# Patient Record
Sex: MALE | Race: WHITE | NOT HISPANIC OR LATINO | Employment: FULL TIME | URBAN - METROPOLITAN AREA
[De-identification: names, ages, dates, MRNs, and addresses within clinical notes are randomized per-mention and may not be internally consistent; named-entity substitution may affect disease eponyms.]

---

## 2019-02-03 ENCOUNTER — MYC MEDICAL ADVICE (OUTPATIENT)
Dept: FAMILY MEDICINE | Facility: CLINIC | Age: 39
End: 2019-02-03

## 2019-02-15 ENCOUNTER — OFFICE VISIT (OUTPATIENT)
Dept: FAMILY MEDICINE | Facility: CLINIC | Age: 39
End: 2019-02-15
Payer: COMMERCIAL

## 2019-02-15 VITALS
TEMPERATURE: 98.4 F | DIASTOLIC BLOOD PRESSURE: 82 MMHG | BODY MASS INDEX: 32.88 KG/M2 | WEIGHT: 222 LBS | SYSTOLIC BLOOD PRESSURE: 139 MMHG | HEIGHT: 69 IN | OXYGEN SATURATION: 94 % | HEART RATE: 60 BPM | RESPIRATION RATE: 16 BRPM

## 2019-02-15 DIAGNOSIS — R03.0 PREHYPERTENSION: ICD-10-CM

## 2019-02-15 DIAGNOSIS — Z00.00 ANNUAL PHYSICAL EXAM: Primary | ICD-10-CM

## 2019-02-15 DIAGNOSIS — Z13.6 CARDIOVASCULAR SCREENING; LDL GOAL LESS THAN 160: ICD-10-CM

## 2019-02-15 DIAGNOSIS — E66.811 CLASS 1 OBESITY DUE TO EXCESS CALORIES WITHOUT SERIOUS COMORBIDITY WITH BODY MASS INDEX (BMI) OF 32.0 TO 32.9 IN ADULT: ICD-10-CM

## 2019-02-15 DIAGNOSIS — E66.09 CLASS 1 OBESITY DUE TO EXCESS CALORIES WITHOUT SERIOUS COMORBIDITY WITH BODY MASS INDEX (BMI) OF 32.0 TO 32.9 IN ADULT: ICD-10-CM

## 2019-02-15 DIAGNOSIS — Z87.39 HISTORY OF GOUT: ICD-10-CM

## 2019-02-15 DIAGNOSIS — B35.1 ONYCHOMYCOSIS: ICD-10-CM

## 2019-02-15 LAB
ALBUMIN SERPL-MCNC: 3.9 G/DL (ref 3.4–5)
ALP SERPL-CCNC: 74 U/L (ref 40–150)
ALT SERPL W P-5'-P-CCNC: 55 U/L (ref 0–70)
ANION GAP SERPL CALCULATED.3IONS-SCNC: 5 MMOL/L (ref 3–14)
AST SERPL W P-5'-P-CCNC: 27 U/L (ref 0–45)
BASOPHILS # BLD AUTO: 0 10E9/L (ref 0–0.2)
BASOPHILS NFR BLD AUTO: 0.4 %
BILIRUB SERPL-MCNC: 0.4 MG/DL (ref 0.2–1.3)
BUN SERPL-MCNC: 12 MG/DL (ref 7–30)
CALCIUM SERPL-MCNC: 8.7 MG/DL (ref 8.5–10.1)
CHLORIDE SERPL-SCNC: 107 MMOL/L (ref 94–109)
CHOLEST SERPL-MCNC: 246 MG/DL
CO2 SERPL-SCNC: 30 MMOL/L (ref 20–32)
CREAT SERPL-MCNC: 1.1 MG/DL (ref 0.66–1.25)
DIFFERENTIAL METHOD BLD: NORMAL
EOSINOPHIL # BLD AUTO: 0.5 10E9/L (ref 0–0.7)
EOSINOPHIL NFR BLD AUTO: 7.8 %
ERYTHROCYTE [DISTWIDTH] IN BLOOD BY AUTOMATED COUNT: 12.3 % (ref 10–15)
GFR SERPL CREATININE-BSD FRML MDRD: 83 ML/MIN/{1.73_M2}
GLUCOSE SERPL-MCNC: 96 MG/DL (ref 70–99)
HBA1C MFR BLD: 5.1 % (ref 0–5.6)
HCT VFR BLD AUTO: 46.5 % (ref 40–53)
HDLC SERPL-MCNC: 31 MG/DL
HGB BLD-MCNC: 15.9 G/DL (ref 13.3–17.7)
LDLC SERPL CALC-MCNC: 159 MG/DL
LYMPHOCYTES # BLD AUTO: 2.2 10E9/L (ref 0.8–5.3)
LYMPHOCYTES NFR BLD AUTO: 32.4 %
MCH RBC QN AUTO: 30.6 PG (ref 26.5–33)
MCHC RBC AUTO-ENTMCNC: 34.2 G/DL (ref 31.5–36.5)
MCV RBC AUTO: 89 FL (ref 78–100)
MONOCYTES # BLD AUTO: 0.6 10E9/L (ref 0–1.3)
MONOCYTES NFR BLD AUTO: 8.1 %
NEUTROPHILS # BLD AUTO: 3.5 10E9/L (ref 1.6–8.3)
NEUTROPHILS NFR BLD AUTO: 51.3 %
NONHDLC SERPL-MCNC: 215 MG/DL
PLATELET # BLD AUTO: 214 10E9/L (ref 150–450)
POTASSIUM SERPL-SCNC: 4.4 MMOL/L (ref 3.4–5.3)
PROT SERPL-MCNC: 7.4 G/DL (ref 6.8–8.8)
PSA SERPL-ACNC: 0.77 UG/L (ref 0–4)
RBC # BLD AUTO: 5.2 10E12/L (ref 4.4–5.9)
SODIUM SERPL-SCNC: 142 MMOL/L (ref 133–144)
TRIGL SERPL-MCNC: 278 MG/DL
TSH SERPL DL<=0.005 MIU/L-ACNC: 1.17 MU/L (ref 0.4–4)
URATE SERPL-MCNC: 8 MG/DL (ref 3.5–7.2)
WBC # BLD AUTO: 6.8 10E9/L (ref 4–11)

## 2019-02-15 PROCEDURE — 80053 COMPREHEN METABOLIC PANEL: CPT | Performed by: INTERNAL MEDICINE

## 2019-02-15 PROCEDURE — 84443 ASSAY THYROID STIM HORMONE: CPT | Performed by: INTERNAL MEDICINE

## 2019-02-15 PROCEDURE — 99396 PREV VISIT EST AGE 40-64: CPT | Performed by: INTERNAL MEDICINE

## 2019-02-15 PROCEDURE — 84550 ASSAY OF BLOOD/URIC ACID: CPT | Performed by: INTERNAL MEDICINE

## 2019-02-15 PROCEDURE — 80061 LIPID PANEL: CPT | Performed by: INTERNAL MEDICINE

## 2019-02-15 PROCEDURE — 85025 COMPLETE CBC W/AUTO DIFF WBC: CPT | Performed by: INTERNAL MEDICINE

## 2019-02-15 PROCEDURE — 36415 COLL VENOUS BLD VENIPUNCTURE: CPT | Performed by: INTERNAL MEDICINE

## 2019-02-15 PROCEDURE — G0103 PSA SCREENING: HCPCS | Performed by: INTERNAL MEDICINE

## 2019-02-15 PROCEDURE — 83036 HEMOGLOBIN GLYCOSYLATED A1C: CPT | Performed by: INTERNAL MEDICINE

## 2019-02-15 RX ORDER — TERBINAFINE HYDROCHLORIDE 250 MG/1
250 TABLET ORAL DAILY
Qty: 90 TABLET | Refills: 1 | Status: SHIPPED | OUTPATIENT
Start: 2019-02-15 | End: 2019-05-16

## 2019-02-15 ASSESSMENT — PAIN SCALES - GENERAL: PAINLEVEL: NO PAIN (0)

## 2019-02-15 ASSESSMENT — MIFFLIN-ST. JEOR: SCORE: 1911.33

## 2019-02-15 NOTE — PATIENT INSTRUCTIONS
At [facility], we strive to deliver an exceptional experience to you, every time we see you.  If you receive a survey in the mail, please send us back your thoughts. We really do value your feedback.    Based on your medical history, these are the current health maintenance/preventive care services that you are due for (some may have been done at this visit.)  Health Maintenance Due   Topic Date Due     HIV SCREEN (SYSTEM ASSIGNED)  12/03/1996     PHQ-2 Q1 YR  09/29/2017         Suggested websites for health information:  Www.Zbird.PurpleBricks : Up to date and easily searchable information on multiple topics.  Www.medlineplus.gov : medication info, interactive tutorials, watch real surgeries online  Www.familydoctor.org : good info from the Academy of Family Physicians  Www.cdc.gov : public health info, travel advisories, epidemics (H1N1)  Www.aap.org : children's health info, normal development, vaccinations  Www.health.[facility].[place].us : [place] dept of health, public health issues in [place], N1N1    Your care team:                            Family Medicine Internal Medicine   MD Aydin Espinosa MD Shantel Branch-Fleming, MD Katya Georgiev PA-C Nam Ho, MD Pediatrics   JAMAAL Chowdary, RAMÍREZ Ledbetter APRN CNP   MD Aure Mcginnis MD Deborah Mielke, MD Kim Thein, APRN CNP      Clinic hours: Monday - Thursday 7 am-7 pm; Fridays 7 am-5 pm.   Urgent care: Monday - Friday 11 am-9 pm; Saturday and Sunday 9 am-5 pm.  Pharmacy : Monday -Thursday 8 am-8 pm; Friday 8 am-6 pm; Saturday and Sunday 9 am-5 pm.     Clinic: [phone]   Pharmacy: [phone]    Preventive Health Recommendations  Male Ages 40 to 49    Yearly exam:             See your health care provider every year in order to  o   Review health changes.   o   Discuss preventive care.    o   Review your medicines if your doctor has prescribed any.    You should be tested each year for  STDs (sexually transmitted diseases) if you re at risk.     Have a cholesterol test every 5 years.     Have a colonoscopy (test for colon cancer) if someone in your family has had colon cancer or polyps before age 50.     After age 45, have a diabetes test (fasting glucose). If you are at risk for diabetes, you should have this test every 3 years.      Talk with your health care provider about whether or not a prostate cancer screening test (PSA) is right for you.    Shots: Get a flu shot each year. Get a tetanus shot every 10 years.     Nutrition:    Eat at least 5 servings of fruits and vegetables daily.     Eat whole-grain bread, whole-wheat pasta and brown rice instead of white grains and rice.     Get adequate Calcium and Vitamin D.     Lifestyle    Exercise for at least 150 minutes a week (30 minutes a day, 5 days a week). This will help you control your weight and prevent disease.     Limit alcohol to one drink per day.     No smoking.     Wear sunscreen to prevent skin cancer.     See your dentist every six months for an exam and cleaning.

## 2019-02-15 NOTE — PROGRESS NOTES
SUBJECTIVE:   CC: Chino Samuel is an 38 year old male who presents for preventive health visit.     Healthy Habits:    Do you get at least three servings of calcium containing foods daily (dairy, green leafy vegetables, etc.)? yes    Amount of exercise or daily activities, outside of work: none    Problems taking medications regularly No    Medication side effects: No    Have you had an eye exam in the past two years? yes    Do you see a dentist twice per year? yes    Do you have sleep apnea, excessive snoring or daytime drowsiness?no    Today's PHQ-2 Score:   PHQ-2 ( 1999 Pfizer) 2/15/2019 9/29/2016   Q1: Little interest or pleasure in doing things 0 0   Q2: Feeling down, depressed or hopeless 0 0   PHQ-2 Score 0 0       Abuse: Current or Past(Physical, Sexual or Emotional)- No  Do you feel safe in your environment? Yes    Social History     Tobacco Use     Smoking status: Never Smoker     Smokeless tobacco: Never Used   Substance Use Topics     Alcohol use: Yes     Alcohol/week: 0.0 oz     Comment: Socially     If you drink alcohol do you typically have >3 drinks per day or >7 drinks per week? No                      Last PSA: No results found for: PSA    Reviewed orders with patient. Reviewed health maintenance and updated orders accordingly - Yes  Labs reviewed in EPIC  BP Readings from Last 3 Encounters:   02/15/19 139/82   09/29/16 108/72   04/16/16 125/84    Wt Readings from Last 3 Encounters:   02/15/19 100.7 kg (222 lb)   09/29/16 97.3 kg (214 lb 6.4 oz)   04/16/16 96.7 kg (213 lb 4 oz)                  Patient Active Problem List   Diagnosis     History of gout     Onychomycosis     CARDIOVASCULAR SCREENING; LDL GOAL LESS THAN 160     Class 1 obesity due to excess calories without serious comorbidity with body mass index (BMI) of 32.0 to 32.9 in adult     Past Surgical History:   Procedure Laterality Date     SHOULDER SURGERY Left 1999       Social History     Tobacco Use     Smoking status: Never Smoker  "    Smokeless tobacco: Never Used   Substance Use Topics     Alcohol use: Yes     Alcohol/week: 0.0 oz     Comment: Socially     Family History   Problem Relation Age of Onset     Diabetes Father      Hypertension Father      Cerebrovascular Disease Father          No Known Allergies  Recent Labs   Lab Test 09/29/16  0827 05/26/15  1048 05/16/14  0922   * 110 128   HDL 36* 34* 40*   TRIG 262* 244* 205*   CR  --   --  1.09   GFRESTIMATED  --   --  77   GFRESTBLACK  --   --  >90   POTASSIUM  --   --  4.7        Reviewed and updated as needed this visit by clinical staff         Reviewed and updated as needed this visit by Provider            ROS:  CONSTITUTIONAL: NEGATIVE for fever, chills, change in weight  INTEGUMENTARY/SKIN: POSITIVE for onychomycosis of right big toe x 8 months that is not responsive to over-the-counter topical antifungal treatment.  EYES: NEGATIVE for vision changes or irritation  ENT: NEGATIVE for ear, mouth and throat problems  ENT: POSITIVE for cough x 2 weeks that is resolving; started after coming from [place].NEGATIVE for epistaxis, hoarseness, sinus pressure, sore throat and vertigo  RESP: NEGATIVE for significant cough or SOB  RESP:NEGATIVE for significant cough or SOB and NEGATIVE for hemoptysis, SOB/dyspnea and wheezing  CV: NEGATIVE for chest pain, palpitations or peripheral edema  GI: NEGATIVE for nausea, abdominal pain, heartburn, or change in bowel habits   male: negative for dysuria, hematuria, decreased urinary stream, erectile dysfunction, urethral discharge  MUSCULOSKELETAL: NEGATIVE for significant arthralgias or myalgia  NEURO: NEGATIVE for weakness, dizziness or paresthesias  ENDOCRINE: NEGATIVE for temperature intolerance, skin/hair changes  HEME/ALLERGY/IMMUNE: NEGATIVE for bleeding problems  PSYCHIATRIC: NEGATIVE for changes in mood or affect    OBJECTIVE:   /82   Pulse 60   Temp 98.4  F (36.9  C) (Oral)   Resp 16   Ht 1.759 m (5' 9.25\")   Wt 100.7 kg " (222 lb)   SpO2 94%   BMI 32.55 kg/m    EXAM:  GENERAL: healthy, alert and no distress  EYES: Eyes grossly normal to inspection  HENT: normal cephalic/atraumatic, ear canals and TM's normal, oropharynx clear and oral mucous membranes moist  NECK: no adenopathy, no asymmetry, masses, or scars and thyroid normal to palpation  RESP: lungs clear to auscultation - no rales, rhonchi or wheezes  CV: regular rate and rhythm, normal S1 S2, no S3 or S4, no murmur, click or rub, no peripheral edema and peripheral pulses strong  ABDOMEN: soft, nontender, no hepatosplenomegaly, no masses and bowel sounds normal  MS: no gross musculoskeletal defects noted, no edema  SKIN: Thickened right big toenail with whitish-yellowish discoloration and subungal hyperkeratosis.  NEURO: Normal strength and tone, mentation intact and speech normal  PSYCH: mentation appears normal, affect normal/bright    Diagnostic Test Results:  Pending.    ASSESSMENT/PLAN:   1. Annual physical exam  Comment: No family history of premature atherosclerotic heart disease or early-onset colon cancer, but significant for diabetes, hypertension and CVA.  - CBC with platelets and differential  - Comprehensive metabolic panel (BMP + Alb, Alk Phos, ALT, AST, Total. Bili, TP)  - PSA, screen  - Hemoglobin A1c  - TSH with free T4 reflex    2. CARDIOVASCULAR SCREENING; LDL GOAL LESS THAN 160  Comment: Treat accordingly is estimated 10 year risk is more than 7.5%.  - Lipid panel reflex to direct LDL Fasting    3. Onychomycosis  Comment: Noted on right big toe. Due to this finding, screen for diabetes.  - terbinafine (LAMISIL) 250 MG tablet; Take 1 tablet (250 mg) by mouth daily  Dispense: 90 tablet; Refill: 1    4. Class 1 obesity due to excess calories without serious comorbidity with body mass index (BMI) of 32.0 to 32.9 in adult  Comment: No complaints of daytime drowsiness despite snoring. Screen for diabetes and thyroid disorders.    5. Prehypertension  Comment:  "Repeat BPs are still above goal range of < 120/80. Low salt diet recommended.    6. History of gout  Comment: Previous level is abnormal at 7.4. Repeat test.  - Uric acid    COUNSELING:  Special attention given to:        Regular exercise       Healthy diet/nutrition       Prostate cancer screening       The 10-year ASCVD risk score (Gerson SHEETS Jr., et al., 2013) is: 2.3%    Values used to calculate the score:      Age: 38 years      Sex: Male      Is Non- : No      Diabetic: No      Tobacco smoker: No      Systolic Blood Pressure: 139 mmHg      Is BP treated: No      HDL Cholesterol: 36 mg/dL      Total Cholesterol: 211 mg/dL    BP Readings from Last 1 Encounters:   09/29/16 108/72     Estimated body mass index is 30.76 kg/m  as calculated from the following:    Height as of 9/29/16: 1.778 m (5' 10\").    Weight as of 9/29/16: 97.3 kg (214 lb 6.4 oz).    BP Screening:   Last 3 BP Readings:    BP Readings from Last 3 Encounters:   02/15/19 139/82   09/29/16 108/72   04/16/16 125/84       The following was recommended to the patient:  Re-screen BP within a year and recommended lifestyle modifications  Weight management plan: dietary modification     reports that  has never smoked. he has never used smokeless tobacco.      Counseling Resources:  ATP IV Guidelines  Pooled Cohorts Equation Calculator  FRAX Risk Assessment  ICSI Preventive Guidelines  Dietary Guidelines for Americans, 2010  USDA's MyPlate  ASA Prophylaxis  Lung CA Screening    Aydin Hansen MD  [facility]  "

## 2019-02-19 ENCOUNTER — MYC MEDICAL ADVICE (OUTPATIENT)
Dept: FAMILY MEDICINE | Facility: CLINIC | Age: 39
End: 2019-02-19

## 2019-03-07 ENCOUNTER — OFFICE VISIT (OUTPATIENT)
Dept: FAMILY MEDICINE | Facility: CLINIC | Age: 39
End: 2019-03-07
Payer: COMMERCIAL

## 2019-03-07 VITALS
DIASTOLIC BLOOD PRESSURE: 87 MMHG | BODY MASS INDEX: 33.03 KG/M2 | WEIGHT: 223 LBS | SYSTOLIC BLOOD PRESSURE: 131 MMHG | HEART RATE: 57 BPM | OXYGEN SATURATION: 95 % | HEIGHT: 69 IN | TEMPERATURE: 98.5 F

## 2019-03-07 DIAGNOSIS — M10.072 ACUTE IDIOPATHIC GOUT INVOLVING TOE OF LEFT FOOT: Primary | ICD-10-CM

## 2019-03-07 PROBLEM — M1A.0710 IDIOPATHIC CHRONIC GOUT OF RIGHT FOOT WITHOUT TOPHUS: Status: ACTIVE | Noted: 2019-02-15

## 2019-03-07 PROCEDURE — 99213 OFFICE O/P EST LOW 20 MIN: CPT | Performed by: FAMILY MEDICINE

## 2019-03-07 RX ORDER — PREDNISONE 20 MG/1
TABLET ORAL
Qty: 12 TABLET | Refills: 0 | Status: SHIPPED | OUTPATIENT
Start: 2019-03-07 | End: 2019-03-14

## 2019-03-07 ASSESSMENT — MIFFLIN-ST. JEOR: SCORE: 1915.86

## 2019-03-07 ASSESSMENT — PAIN SCALES - GENERAL: PAINLEVEL: EXTREME PAIN (8)

## 2019-03-07 NOTE — PROGRESS NOTES
"  SUBJECTIVE:   Chino Samuel is a 38 year old male who presents to clinic today for the following health issues:      Gout/ single inflamed joint   Onset: Last Night     Description:   Location: big toe - right  Joint Swelling: YES  Redness: YES  Pain: YES    Intensity: 8/10    Progression of Symptoms:  improving    Accompanying Signs & Symptoms:  Fevers: no     History:   Trauma to the area: no   Previous history of gout: YES   Recent illness:  no     Precipitating factors:   Diet-rich in purine: no  Alcohol use: no   Diuretic use: no     Alleviating factors:  none    Therapies Tried and outcome: Old Rx of Prednisone    Patient reports that he has had about 1 gout flare a year for about 3-4 years. He states that he noticed a crack in his right great toe when exercising, and this morning it was swollen and painful. His gout flares are usually in his right ankle or right big toe. Patient reports prednisone works well in the past, he believes he has had about a 1 week taper. He was prescribed Lamisil as well, but he has not yet tried taking this as he is trying home remedies first.       Past medical, family, and social histories, medications, and allergies are reviewed and updated in Epic.    ROS:  Constitutional, HEENT, cardiovascular, pulmonary, gi and gu systems are negative, except as otherwise noted.    This document serves as a record of the services and decisions personally performed by ANDRE THOMAS. It was created on his/her behalf by Chelle Cuello, a trained medical scribe. The creation of this document is based on the provider's statements to the medical scribe. Chelle Cuello, March 7, 2019 5:15 PM    OBJECTIVE:     /87   Pulse 57   Temp 98.5  F (36.9  C) (Oral)   Ht 1.759 m (5' 9.25\")   Wt 101.2 kg (223 lb)   SpO2 95%   BMI 32.69 kg/m    Body mass index is 32.69 kg/m .  GENERAL: healthy, alert and no distress  EYES: Eyes grossly normal to inspection, PERRL and conjunctivae and sclerae normal  MS: " no gross musculoskeletal defects noted. Swelling of the right foot first MTP joint, and pain with palpation of the first MDP joint, and IP joint.   SKIN: no suspicious lesions or rashes  NEURO: Normal strength and tone, mentation intact and speech normal, cranial nerves 2-12 intact   PSYCH: mentation appears normal, affect normal/bright    Diagnostic Test Results:  Results for orders placed or performed in visit on 02/15/19   CBC with platelets and differential   Result Value Ref Range    WBC 6.8 4.0 - 11.0 10e9/L    RBC Count 5.20 4.4 - 5.9 10e12/L    Hemoglobin 15.9 13.3 - 17.7 g/dL    Hematocrit 46.5 40.0 - 53.0 %    MCV 89 78 - 100 fl    MCH 30.6 26.5 - 33.0 pg    MCHC 34.2 31.5 - 36.5 g/dL    RDW 12.3 10.0 - 15.0 %    Platelet Count 214 150 - 450 10e9/L    % Neutrophils 51.3 %    % Lymphocytes 32.4 %    % Monocytes 8.1 %    % Eosinophils 7.8 %    % Basophils 0.4 %    Absolute Neutrophil 3.5 1.6 - 8.3 10e9/L    Absolute Lymphocytes 2.2 0.8 - 5.3 10e9/L    Absolute Monocytes 0.6 0.0 - 1.3 10e9/L    Absolute Eosinophils 0.5 0.0 - 0.7 10e9/L    Absolute Basophils 0.0 0.0 - 0.2 10e9/L    Diff Method Automated Method    Comprehensive metabolic panel (BMP + Alb, Alk Phos, ALT, AST, Total. Bili, TP)   Result Value Ref Range    Sodium 142 133 - 144 mmol/L    Potassium 4.4 3.4 - 5.3 mmol/L    Chloride 107 94 - 109 mmol/L    Carbon Dioxide 30 20 - 32 mmol/L    Anion Gap 5 3 - 14 mmol/L    Glucose 96 70 - 99 mg/dL    Urea Nitrogen 12 7 - 30 mg/dL    Creatinine 1.10 0.66 - 1.25 mg/dL    GFR Estimate 83 >60 mL/min/[1.73_m2]    GFR Estimate If Black >90 >60 mL/min/[1.73_m2]    Calcium 8.7 8.5 - 10.1 mg/dL    Bilirubin Total 0.4 0.2 - 1.3 mg/dL    Albumin 3.9 3.4 - 5.0 g/dL    Protein Total 7.4 6.8 - 8.8 g/dL    Alkaline Phosphatase 74 40 - 150 U/L    ALT 55 0 - 70 U/L    AST 27 0 - 45 U/L   Lipid panel reflex to direct LDL Fasting   Result Value Ref Range    Cholesterol 246 (H) <200 mg/dL    Triglycerides 278 (H) <150 mg/dL     HDL Cholesterol 31 (L) >39 mg/dL    LDL Cholesterol Calculated 159 (H) <100 mg/dL    Non HDL Cholesterol 215 (H) <130 mg/dL   PSA, screen   Result Value Ref Range    PSA 0.77 0 - 4 ug/L   Hemoglobin A1c   Result Value Ref Range    Hemoglobin A1C 5.1 0 - 5.6 %   TSH with free T4 reflex   Result Value Ref Range    TSH 1.17 0.40 - 4.00 mU/L   Uric acid   Result Value Ref Range    Uric Acid 8.0 (H) 3.5 - 7.2 mg/dL       ASSESSMENT/PLAN:     (M10.072) Acute idiopathic gout involving toe of left foot  (primary encounter diagnosis)  Comment: Not his usual location, but still a typical presentation.   Plan: predniSONE (DELTASONE) 20 MG tablet        Follow up PRN. Consider allopurinol if frequency increases.       The information in this document, created by the medical scribe Chelle Cuello for me, accurately reflects the services I personally performed and the decisions made by me. I have reviewed and approved this document for accuracy prior to leaving the patient care area.    Rc Short MD  [facility]

## 2019-03-14 ENCOUNTER — OFFICE VISIT (OUTPATIENT)
Dept: FAMILY MEDICINE | Facility: CLINIC | Age: 39
End: 2019-03-14
Payer: COMMERCIAL

## 2019-03-14 VITALS
HEIGHT: 69 IN | HEART RATE: 72 BPM | OXYGEN SATURATION: 98 % | DIASTOLIC BLOOD PRESSURE: 79 MMHG | BODY MASS INDEX: 32.73 KG/M2 | WEIGHT: 221 LBS | SYSTOLIC BLOOD PRESSURE: 123 MMHG | TEMPERATURE: 98.6 F

## 2019-03-14 DIAGNOSIS — M1A.0710 CHRONIC IDIOPATHIC GOUT INVOLVING TOE OF RIGHT FOOT WITHOUT TOPHUS: Primary | ICD-10-CM

## 2019-03-14 PROCEDURE — 99213 OFFICE O/P EST LOW 20 MIN: CPT | Performed by: FAMILY MEDICINE

## 2019-03-14 RX ORDER — COLCHICINE 0.6 MG/1
0.6 TABLET ORAL 3 TIMES DAILY PRN
Qty: 40 TABLET | Refills: 0 | Status: SHIPPED | OUTPATIENT
Start: 2019-03-14 | End: 2019-05-19

## 2019-03-14 RX ORDER — PREDNISONE 20 MG/1
TABLET ORAL
Qty: 12 TABLET | Refills: 0 | Status: SHIPPED | OUTPATIENT
Start: 2019-03-14 | End: 2019-03-20

## 2019-03-14 ASSESSMENT — PAIN SCALES - GENERAL: PAINLEVEL: EXTREME PAIN (8)

## 2019-03-14 ASSESSMENT — MIFFLIN-ST. JEOR: SCORE: 1906.79

## 2019-03-14 NOTE — PROGRESS NOTES
"  SUBJECTIVE:   Chino Samuel is a 38 year old male who presents to clinic today for the following health issues:    Gout/ single inflamed joint   Onset: about March 6, 2018    Description:   Location: big toe - right  Joint Swelling: YES  Redness: YES  Pain: YES- 8/10    Intensity: 8/10    Progression of Symptoms:  worsening    Accompanying Signs & Symptoms:  Fevers: no     History:   Trauma to the area: no   Previous history of gout: YES   Recent illness:  no     Precipitating factors:   Diet-rich in purine: no  Alcohol use: no   Diuretic use: no     Alleviating factors:  none    Therapies Tried and outcome: Prednisone last dose was 1 day ago    Gout did not fully resolve with the prednisone. Symptoms have flared again beginning this morning after finishing the prednisone yesterday; symptoms had begun to resolve, but progression plateaued at 20 mg every day dosage. He has taken indomethacin but not colchicine; indomethacin was initially effective but he notes a side effect of headache with subsequent doses of indomethacin.    Past medical, family, and social histories, medications, and allergies are reviewed and updated in Epic.    ROS:  Constitutional, HEENT, cardiovascular, pulmonary, gi and gu systems are negative, except as otherwise noted.    This document serves as a record of the services and decisions personally performed and made by Rc Short MD and was created by Mandeep Osuna, a trained medical scribe, based on personal observations and provider statements to the medical scribe.  March 14, 2019 10:08 AM   Mandeep Osuna    OBJECTIVE:     /79 (BP Location: Left arm, Patient Position: Chair, Cuff Size: Adult Large)   Pulse 72   Temp 98.6  F (37  C) (Oral)   Ht 1.759 m (5' 9.25\")   Wt 100.2 kg (221 lb)   SpO2 98%   BMI 32.40 kg/m    Body mass index is 32.4 kg/m .  GENERAL: healthy, alert and no distress  EYES: Eyes grossly normal to inspection, PERRL and conjunctivae and sclerae normal  MS: no " gross musculoskeletal defects noted. Swelling of the right foot first MTP joint, and pain with palpation of the first MDP joint, and IP joint.   SKIN: no suspicious lesions or rashes  NEURO: Normal strength and tone, mentation intact and speech normal, cranial nerves 2-12 intact   PSYCH: mentation appears normal, affect normal/bright    ASSESSMENT/PLAN:     (M1A.0710) Chronic idiopathic gout involving toe of right foot without tophus  (primary encounter diagnosis)  Comment: Current gout flare did not completely resolve with usual treatment.  Plan: colchicine (COLCYRS) 0.6 MG tablet           predniSONE (DELTASONE) 20 MG tablet  Instructed patient to restart prednisone and begin colchicine, taking it TID until symptoms resolve. Then he may either continue taking them or use prn for future flare-ups.  Return in about 8 days (around 3/22/2019) for recheck if gout symptoms not resolved by then.     The information in this document, created by the medical scribe for me, accurately reflects the services I personally performed and the decisions made by me. I have reviewed and approved this document for accuracy prior to leaving the patient care area.  March 14, 2019 10:22 AM  Rc Short MD  [facility]

## 2019-03-14 NOTE — PATIENT INSTRUCTIONS
================================================================================  Normal Values   Blood pressure  <140/90 for most adults    <130/80 for some chronic diseases (ask your care team about yours)    BMI (body mass index)  18.5-25 kg/m2 (based on height and weight)     Thank you for visiting [facility]    Normal or non-critical lab and imaging results will be communicated to you by MyChart, letter or phone within 7 days.  If you do not hear from us within 10 days, please call the clinic. If you have a critical or abnormal lab result, we will notify you by phone as soon as possible.     If you have any questions regarding your visit please contact:     Team Comfort:   Clinic Hours Telephone Number   Dr. Rc Luna Dr. [place] 7am-5pm  Monday - Friday [phone]  Elizabeth RN  Anna RN  Sia RN   Pharmacy 8:00am-8pm Monday-Friday    9am-5pm Saturday-Sunday [phone]   Urgent Care 11am-9pm Monday-Friday        9am-5pm Saturday-Sunday [phone]     After hours, weekend or if you need to make an appointment with your primary provider please call [phone].   After Hours nurse advise: call [facility]: [phone]    Medication Refills:  Call your pharmacy and they will forward the refill to us. Please allow 3 business days for your refills to be completed.

## 2019-05-19 ENCOUNTER — MYC REFILL (OUTPATIENT)
Dept: FAMILY MEDICINE | Facility: CLINIC | Age: 39
End: 2019-05-19

## 2019-05-19 DIAGNOSIS — M1A.0710 CHRONIC IDIOPATHIC GOUT INVOLVING TOE OF RIGHT FOOT WITHOUT TOPHUS: ICD-10-CM

## 2019-05-20 NOTE — TELEPHONE ENCOUNTER
"Requested Prescriptions   Pending Prescriptions Disp Refills     colchicine (COLCYRS) 0.6 MG tablet 40 tablet 0     Sig: Take 1 tablet (0.6 mg) by mouth 3 times daily as needed (gout flare)       Gout Agents Protocol Failed - 5/19/2019  2:22 PM        Failed - Has Uric Acid on file in past 12 months and value is less than 6     Recent Labs   Lab Test 02/15/19  0746   URIC 8.0*     If level is 6mg/dL or greater, ok to refill one time and refer to provider.           Passed - CBC on file in past 12 months     Recent Labs   Lab Test 02/15/19  0746   WBC 6.8   RBC 5.20   HGB 15.9   HCT 46.5                    Passed - ALT on file in past 12 months     Recent Labs   Lab Test 02/15/19  0746   ALT 55             Passed - Recent (12 mo) or future (30 days) visit within the authorizing provider's specialty     Patient had office visit in the last 12 months or has a visit in the next 30 days with authorizing provider or within the authorizing provider's specialty.  See \"Patient Info\" tab in inbasket, or \"Choose Columns\" in Meds & Orders section of the refill encounter.              Passed - Medication is active on med list        Passed - Patient is age 18 or older        Passed - Normal serum creatinine on file in the past 12 months     Recent Labs   Lab Test 02/15/19  0746   CR 1.10             colchicine (COLCYRS) 0.6 MG tablet  Last Written Prescription Date:  3/14/19  Last Fill Quantity: 40,  # refills: 0   Last office visit: 3/14/2019 with prescribing provider:  ORLANDO Short   Future Office Visit:      "

## 2019-05-20 NOTE — TELEPHONE ENCOUNTER
Patient Mychart Comment:  If possible I would like to have a renewable prescription for this medication to treat gout flares as they occur. This medication seems to work well.     Delgado Garcia CMA

## 2019-05-21 ENCOUNTER — MYC REFILL (OUTPATIENT)
Dept: FAMILY MEDICINE | Facility: CLINIC | Age: 39
End: 2019-05-21

## 2019-05-21 DIAGNOSIS — M1A.0710 CHRONIC IDIOPATHIC GOUT INVOLVING TOE OF RIGHT FOOT WITHOUT TOPHUS: ICD-10-CM

## 2019-05-21 RX ORDER — COLCHICINE 0.6 MG/1
0.6 TABLET ORAL 3 TIMES DAILY PRN
Qty: 40 TABLET | Refills: 0 | Status: SHIPPED | OUTPATIENT
Start: 2019-05-21 | End: 2020-01-10 | Stop reason: DRUGHIGH

## 2019-05-21 NOTE — TELEPHONE ENCOUNTER
Rx sent today, looks like patient may be wanting a future as well?    colchicine (COLCYRS) 0.6 MG tablet 40 tablet 0 5/21/2019

## 2019-05-21 NOTE — TELEPHONE ENCOUNTER
Routing refill request to provider for review/approval because:  Labs out of range:  Uric Acid >6  Anna Sullivan RN

## 2019-05-23 RX ORDER — COLCHICINE 0.6 MG/1
0.6 TABLET ORAL 3 TIMES DAILY PRN
Qty: 40 TABLET | Refills: 2 | Status: SHIPPED | OUTPATIENT
Start: 2019-05-23 | End: 2019-05-24

## 2019-05-24 ENCOUNTER — E-VISIT (OUTPATIENT)
Dept: FAMILY MEDICINE | Facility: CLINIC | Age: 39
End: 2019-05-24

## 2019-05-24 ENCOUNTER — E-VISIT (OUTPATIENT)
Dept: FAMILY MEDICINE | Facility: CLINIC | Age: 39
End: 2019-05-24
Payer: COMMERCIAL

## 2019-05-24 DIAGNOSIS — Z53.9 DIAGNOSIS NOT YET DEFINED: Primary | ICD-10-CM

## 2019-05-24 DIAGNOSIS — M1A.0710 CHRONIC IDIOPATHIC GOUT INVOLVING TOE OF RIGHT FOOT WITHOUT TOPHUS: ICD-10-CM

## 2019-05-24 PROCEDURE — 99444 ZZC PHYSICIAN ONLINE EVALUATION & MANAGEMENT SERVICE: CPT | Performed by: FAMILY MEDICINE

## 2019-05-24 RX ORDER — COLCHICINE 0.6 MG/1
0.6 TABLET ORAL 2 TIMES DAILY
Qty: 60 TABLET | Refills: 6 | Status: SHIPPED | OUTPATIENT
Start: 2019-05-24 | End: 2020-01-09

## 2020-01-09 ENCOUNTER — MYC REFILL (OUTPATIENT)
Dept: FAMILY MEDICINE | Facility: CLINIC | Age: 40
End: 2020-01-09

## 2020-01-09 DIAGNOSIS — M1A.0710 CHRONIC IDIOPATHIC GOUT INVOLVING TOE OF RIGHT FOOT WITHOUT TOPHUS: ICD-10-CM

## 2020-01-10 ENCOUNTER — MYC REFILL (OUTPATIENT)
Dept: FAMILY MEDICINE | Facility: CLINIC | Age: 40
End: 2020-01-10

## 2020-01-10 DIAGNOSIS — M1A.0710 CHRONIC IDIOPATHIC GOUT INVOLVING TOE OF RIGHT FOOT WITHOUT TOPHUS: ICD-10-CM

## 2020-01-10 RX ORDER — COLCHICINE 0.6 MG/1
0.6 TABLET ORAL 3 TIMES DAILY PRN
Qty: 40 TABLET | Refills: 0 | Status: CANCELLED | OUTPATIENT
Start: 2020-01-10

## 2020-01-10 RX ORDER — COLCHICINE 0.6 MG/1
0.6 TABLET ORAL 2 TIMES DAILY
Qty: 60 TABLET | Refills: 6 | Status: SHIPPED | OUTPATIENT
Start: 2020-01-10 | End: 2021-05-28

## 2020-01-10 NOTE — TELEPHONE ENCOUNTER
"Requested Prescriptions   Pending Prescriptions Disp Refills     colchicine (COLCYRS) 0.6 MG tablet  Last Written Prescription Date:  5/24/19  Last Fill Quantity: 60 tablet,  # refills: 6   Last office visit: 3/14/2019 with prescribing provider:  Dr Fraser   Future Office Visit:     60 tablet 6     Sig: Take 1 tablet (0.6 mg) by mouth 2 times daily       Gout Agents Protocol Failed - 1/10/2020  6:47 AM        Failed - Has Uric Acid on file in past 12 months and value is less than 6     Recent Labs   Lab Test 02/15/19  0746   URIC 8.0*     If level is 6mg/dL or greater, ok to refill one time and refer to provider.           Passed - CBC on file in past 12 months     Recent Labs   Lab Test 02/15/19  0746   WBC 6.8   RBC 5.20   HGB 15.9   HCT 46.5                    Passed - ALT on file in past 12 months     Recent Labs   Lab Test 02/15/19  0746   ALT 55             Passed - Recent (12 mo) or future (30 days) visit within the authorizing provider's specialty     Patient has had an office visit with the authorizing provider or a provider within the authorizing providers department within the previous 12 mos or has a future within next 30 days. See \"Patient Info\" tab in inbasket, or \"Choose Columns\" in Meds & Orders section of the refill encounter.              Passed - Medication is active on med list        Passed - Patient is age 18 or older        Passed - Normal serum creatinine on file in the past 12 months     Recent Labs   Lab Test 02/15/19  0746   CR 1.10               "

## 2020-01-10 NOTE — TELEPHONE ENCOUNTER
Routing refill request to provider for review/approval because:  Labs out of range:  Uric acid    Shonna Mack RN, [facility]

## 2020-01-10 NOTE — TELEPHONE ENCOUNTER
"Requested Prescriptions   Pending Prescriptions Disp Refills     colchicine (COLCYRS) 0.6 MG tablet 40 tablet 0     Sig: Take 1 tablet (0.6 mg) by mouth 3 times daily as needed (gout flare)         Last Written Prescription Date:  1/10/20  Last Fill Quantity: 60,  # refills: 6   Last Office Visit with [facility] or [facility] prescribing provider:  3/14/19   Future Office Visit:         Gout Agents Protocol Failed - 1/10/2020  3:39 PM        Failed - Has Uric Acid on file in past 12 months and value is less than 6     Recent Labs   Lab Test 02/15/19  0746   URIC 8.0*     If level is 6mg/dL or greater, ok to refill one time and refer to provider.           Passed - CBC on file in past 12 months     Recent Labs   Lab Test 02/15/19  0746   WBC 6.8   RBC 5.20   HGB 15.9   HCT 46.5                    Passed - ALT on file in past 12 months     Recent Labs   Lab Test 02/15/19  0746   ALT 55             Passed - Recent (12 mo) or future (30 days) visit within the authorizing provider's specialty     Patient has had an office visit with the authorizing provider or a provider within the authorizing providers department within the previous 12 mos or has a future within next 30 days. See \"Patient Info\" tab in inbasket, or \"Choose Columns\" in Meds & Orders section of the refill encounter.              Passed - Medication is active on med list        Passed - Patient is age 18 or older        Passed - Normal serum creatinine on file in the past 12 months     Recent Labs   Lab Test 02/15/19  0746   CR 1.10                   Avila Faarax  [facility]  "

## 2020-01-13 NOTE — TELEPHONE ENCOUNTER
Routing refill request to provider for review/approval because:  Labs out of range:  Uric acid    Shonna Mack RN, [facility]

## 2020-08-13 ENCOUNTER — OFFICE VISIT (OUTPATIENT)
Dept: FAMILY MEDICINE | Facility: CLINIC | Age: 40
End: 2020-08-13
Payer: COMMERCIAL

## 2020-08-13 VITALS
HEIGHT: 70 IN | HEART RATE: 71 BPM | WEIGHT: 215 LBS | OXYGEN SATURATION: 97 % | BODY MASS INDEX: 30.78 KG/M2 | RESPIRATION RATE: 16 BRPM | DIASTOLIC BLOOD PRESSURE: 89 MMHG | SYSTOLIC BLOOD PRESSURE: 130 MMHG | TEMPERATURE: 97.3 F

## 2020-08-13 DIAGNOSIS — H61.91 EARLOBE LESION, RIGHT: ICD-10-CM

## 2020-08-13 DIAGNOSIS — Z13.6 CARDIOVASCULAR SCREENING; LDL GOAL LESS THAN 160: ICD-10-CM

## 2020-08-13 DIAGNOSIS — Z00.00 ROUTINE GENERAL MEDICAL EXAMINATION AT A HEALTH CARE FACILITY: Primary | ICD-10-CM

## 2020-08-13 DIAGNOSIS — Z83.3 FAMILY HISTORY OF DIABETES MELLITUS: ICD-10-CM

## 2020-08-13 LAB
ALBUMIN SERPL-MCNC: 4.4 G/DL (ref 3.4–5)
ALP SERPL-CCNC: 82 U/L (ref 40–150)
ALT SERPL W P-5'-P-CCNC: 49 U/L (ref 0–70)
ANION GAP SERPL CALCULATED.3IONS-SCNC: 4 MMOL/L (ref 3–14)
AST SERPL W P-5'-P-CCNC: 20 U/L (ref 0–45)
BILIRUB SERPL-MCNC: 0.7 MG/DL (ref 0.2–1.3)
BUN SERPL-MCNC: 13 MG/DL (ref 7–30)
CALCIUM SERPL-MCNC: 9.3 MG/DL (ref 8.5–10.1)
CHLORIDE SERPL-SCNC: 103 MMOL/L (ref 94–109)
CHOLEST SERPL-MCNC: 217 MG/DL
CO2 SERPL-SCNC: 31 MMOL/L (ref 20–32)
CREAT SERPL-MCNC: 1.14 MG/DL (ref 0.66–1.25)
GFR SERPL CREATININE-BSD FRML MDRD: 79 ML/MIN/{1.73_M2}
GLUCOSE SERPL-MCNC: 89 MG/DL (ref 70–99)
HBA1C MFR BLD: 5.2 % (ref 0–5.6)
HDLC SERPL-MCNC: 37 MG/DL
LDLC SERPL CALC-MCNC: 131 MG/DL
NONHDLC SERPL-MCNC: 180 MG/DL
POTASSIUM SERPL-SCNC: 4.3 MMOL/L (ref 3.4–5.3)
PROT SERPL-MCNC: 8 G/DL (ref 6.8–8.8)
SODIUM SERPL-SCNC: 138 MMOL/L (ref 133–144)
TRIGL SERPL-MCNC: 244 MG/DL

## 2020-08-13 PROCEDURE — 83036 HEMOGLOBIN GLYCOSYLATED A1C: CPT | Performed by: FAMILY MEDICINE

## 2020-08-13 PROCEDURE — 36415 COLL VENOUS BLD VENIPUNCTURE: CPT | Performed by: FAMILY MEDICINE

## 2020-08-13 PROCEDURE — 99396 PREV VISIT EST AGE 40-64: CPT | Performed by: FAMILY MEDICINE

## 2020-08-13 PROCEDURE — 80053 COMPREHEN METABOLIC PANEL: CPT | Performed by: FAMILY MEDICINE

## 2020-08-13 PROCEDURE — 80061 LIPID PANEL: CPT | Performed by: FAMILY MEDICINE

## 2020-08-13 ASSESSMENT — MIFFLIN-ST. JEOR: SCORE: 1882.51

## 2020-08-13 ASSESSMENT — PAIN SCALES - GENERAL: PAINLEVEL: NO PAIN (0)

## 2020-08-13 NOTE — PROGRESS NOTES
3  SUBJECTIVE:   CC: Chino Samuel is an 39 year old male who presents for preventive health visit.     Healthy Habits:    Do you get at least three servings of calcium containing foods daily (dairy, green leafy vegetables, etc.)? No     Amount of exercise or daily activities, outside of work: 3-4 day(s) per week    Problems taking medications regularly not applicable    Medication side effects:N/a    Have you had an eye exam in the past two years? yes    Do you see a dentist twice per year? yes    Do you have sleep apnea, excessive snoring or daytime drowsiness?no    Today's PHQ-2 Score:   PHQ-2 ( 1999 Pfizer) 2/15/2019 9/29/2016   Q1: Little interest or pleasure in doing things 0 0   Q2: Feeling down, depressed or hopeless 0 0   PHQ-2 Score 0 0       Abuse: Current or Past(Physical, Sexual or Emotional)- No  Do you feel safe in your environment? Yes        Social History     Tobacco Use     Smoking status: Never Smoker     Smokeless tobacco: Never Used   Substance Use Topics     Alcohol use: Yes     Alcohol/week: 0.0 standard drinks     Comment: Socially     If you drink alcohol do you typically have >3 drinks per day or >7 drinks per week? No                      Last PSA:   PSA   Date Value Ref Range Status   02/15/2019 0.77 0 - 4 ug/L Final     Comment:     Assay Method:  Chemiluminescence using Siemens Vista analyzer       Reviewed orders with patient. Reviewed health maintenance and updated orders accordingly - Yes  Lab work is in process  Labs reviewed in EPIC  BP Readings from Last 3 Encounters:   08/13/20 130/89   03/14/19 123/79   03/07/19 131/87    Wt Readings from Last 3 Encounters:   08/13/20 97.5 kg (215 lb)   03/14/19 100.2 kg (221 lb)   03/07/19 101.2 kg (223 lb)                  Patient Active Problem List   Diagnosis     Idiopathic chronic gout of right foot without tophus     Onychomycosis     CARDIOVASCULAR SCREENING; LDL GOAL LESS THAN 160     Obesity, Class I, BMI 30-34.9     Prehypertension  "    Past Surgical History:   Procedure Laterality Date     SHOULDER SURGERY Left 1999       Social History     Tobacco Use     Smoking status: Never Smoker     Smokeless tobacco: Never Used   Substance Use Topics     Alcohol use: Yes     Alcohol/week: 0.0 standard drinks     Comment: Socially     Family History   Problem Relation Age of Onset     Diabetes Father      Hypertension Father      Cerebrovascular Disease Father          Current Outpatient Medications   Medication Sig Dispense Refill     colchicine (COLCYRS) 0.6 MG tablet Take 1 tablet (0.6 mg) by mouth 2 times daily (Patient not taking: Reported on 8/13/2020) 60 tablet 6     ibuprofen (ADVIL,MOTRIN) 200 MG tablet Take 200 mg by mouth every 4 hours as needed for mild pain       No Known Allergies    Reviewed and updated as needed this visit by clinical staff  Tobacco  Allergies  Meds  Med Hx  Surg Hx  Fam Hx  Soc Hx        Reviewed and updated as needed this visit by Provider            ROS:  CONSTITUTIONAL: NEGATIVE for fever, chills, change in weight  INTEGUMENTARY/SKIN: NEGATIVE for worrisome rashes, moles or lesions  EYES: NEGATIVE for vision changes or irritation  ENT: NEGATIVE for ear, mouth and throat problems  RESP: NEGATIVE for significant cough or SOB  CV: NEGATIVE for chest pain, palpitations or peripheral edema  GI: NEGATIVE for nausea, abdominal pain, heartburn, or change in bowel habits   male: negative for dysuria, hematuria, decreased urinary stream, erectile dysfunction, urethral discharge  MUSCULOSKELETAL: NEGATIVE for significant arthralgias or myalgia  NEURO: NEGATIVE for weakness, dizziness or paresthesias  PSYCHIATRIC: NEGATIVE for changes in mood or affect    OBJECTIVE:   /89 (BP Location: Left arm, Patient Position: Chair, Cuff Size: Adult Regular)   Pulse 71   Temp 97.3  F (36.3  C) (Tympanic)   Resp 16   Ht 1.772 m (5' 9.75\")   Wt 97.5 kg (215 lb)   SpO2 97%   BMI 31.07 kg/m    EXAM:  GENERAL: healthy, alert " "and no distress  NECK: no adenopathy, no asymmetry, masses, or scars and thyroid normal to palpation  RESP: lungs clear to auscultation - no rales, rhonchi or wheezes  CV: regular rate and rhythm, normal S1 S2, no S3 or S4, no murmur, click or rub, no peripheral edema and peripheral pulses strong  ABDOMEN: soft, nontender, no hepatosplenomegaly, no masses and bowel sounds normal  MS: no gross musculoskeletal defects noted, no edema    Diagnostic Test Results:  Labs reviewed in Epic    ASSESSMENT/PLAN:   1. Routine general medical examination at a health care facility  As below.    2. CARDIOVASCULAR SCREENING; LDL GOAL LESS THAN 160    - Comprehensive metabolic panel (BMP + Alb, Alk Phos, ALT, AST, Total. Bili, TP)  - Lipid panel reflex to direct LDL Fasting  - Hemoglobin A1c    3. Family history of diabetes mellitus    - Hemoglobin A1c    4. Earlobe lesion, right    - DERMATOLOGY REFERRAL    COUNSELING:  Reviewed preventive health counseling, as reflected in patient instructions       Regular exercise       Healthy diet/nutrition       Vision screening    Estimated body mass index is 32.4 kg/m  as calculated from the following:    Height as of 3/14/19: 1.759 m (5' 9.25\").    Weight as of 3/14/19: 100.2 kg (221 lb).         reports that he has never smoked. He has never used smokeless tobacco.      Counseling Resources:  ATP IV Guidelines  Pooled Cohorts Equation Calculator  FRAX Risk Assessment  ICSI Preventive Guidelines  Dietary Guidelines for Americans, 2010  USDA's MyPlate  ASA Prophylaxis  Lung CA Screening    Murali Luna MD, MD  [facility]  "

## 2021-01-12 NOTE — PATIENT INSTRUCTIONS
================================================================================  Normal Values   Blood pressure  <140/90 for most adults    <130/80 for some chronic diseases (ask your care team about yours)    BMI (body mass index)  18.5-25 kg/m2 (based on height and weight)     Thank you for visiting [facility]    Normal or non-critical lab and imaging results will be communicated to you by MyChart, letter or phone within 7 days.  If you do not hear from us within 10 days, please call the clinic. If you have a critical or abnormal lab result, we will notify you by phone as soon as possible.     If you have any questions regarding your visit please contact:     Team Comfort:   Clinic Hours Telephone Number   Dr. Rc Luna Dr. [place] 7am-5pm  Monday - Friday [phone]  Elizabeth RN  Anna RN  Sia RN   Pharmacy 8:00am-8pm Monday-Friday    9am-5pm Saturday-Sunday [phone]   Urgent Care 11am-9pm Monday-Friday        9am-5pm Saturday-Sunday [phone]     After hours, weekend or if you need to make an appointment with your primary provider please call [phone].   After Hours nurse advise: call [facility]: [phone]    Medication Refills:  Call your pharmacy and they will forward the refill to us. Please allow 3 business days for your refills to be completed.          
oral

## 2021-05-25 DIAGNOSIS — M1A.0710 CHRONIC IDIOPATHIC GOUT INVOLVING TOE OF RIGHT FOOT WITHOUT TOPHUS: ICD-10-CM

## 2021-05-26 NOTE — TELEPHONE ENCOUNTER
"Routing refill request to provider for review/approval because:  Requested Prescriptions   Pending Prescriptions Disp Refills    colchicine (COLCYRS) 0.6 MG tablet [Pharmacy Med Name: COLCHICINE 0.6MG TABLETS] 60 tablet 6     Sig: TAKE 1 TABLET BY MOUTH TWICE DAILY       Gout Agents Protocol Failed - 5/25/2021  9:19 AM        Failed - CBC on file in past 12 months     Recent Labs   Lab Test 02/15/19  0746   WBC 6.8   RBC 5.20   HGB 15.9   HCT 46.5                    Failed - Has Uric Acid on file in past 12 months and value is less than 6     Recent Labs   Lab Test 02/15/19  0746   URIC 8.0*     If level is 6mg/dL or greater, ok to refill one time and refer to provider.           Passed - ALT on file in past 12 months     Recent Labs   Lab Test 08/13/20  1134   ALT 49             Passed - Recent (12 mo) or future (30 days) visit within the authorizing provider's specialty     Patient has had an office visit with the authorizing provider or a provider within the authorizing providers department within the previous 12 mos or has a future within next 30 days. See \"Patient Info\" tab in inbasket, or \"Choose Columns\" in Meds & Orders section of the refill encounter.              Passed - Medication is active on med list        Passed - Patient is age 18 or older        Passed - Normal serum creatinine on file in the past 12 months     Recent Labs   Lab Test 08/13/20  1134   CR 1.14       Ok to refill medication if creatinine is low                 Shonna WBEER, RN          "

## 2021-05-28 RX ORDER — COLCHICINE 0.6 MG/1
TABLET ORAL
Qty: 60 TABLET | Refills: 0 | Status: SHIPPED | OUTPATIENT
Start: 2021-05-28 | End: 2021-10-26

## 2021-08-25 ENCOUNTER — OFFICE VISIT (OUTPATIENT)
Dept: FAMILY MEDICINE | Facility: CLINIC | Age: 41
End: 2021-08-25
Payer: COMMERCIAL

## 2021-08-25 VITALS
RESPIRATION RATE: 12 BRPM | BODY MASS INDEX: 29.96 KG/M2 | HEIGHT: 70 IN | DIASTOLIC BLOOD PRESSURE: 74 MMHG | TEMPERATURE: 98.2 F | SYSTOLIC BLOOD PRESSURE: 108 MMHG | WEIGHT: 209.3 LBS | HEART RATE: 64 BPM

## 2021-08-25 DIAGNOSIS — E66.811 OBESITY, CLASS I, BMI 30-34.9: ICD-10-CM

## 2021-08-25 DIAGNOSIS — Z00.00 ROUTINE GENERAL MEDICAL EXAMINATION AT A HEALTH CARE FACILITY: Primary | ICD-10-CM

## 2021-08-25 DIAGNOSIS — Z11.59 NEED FOR HEPATITIS C SCREENING TEST: ICD-10-CM

## 2021-08-25 DIAGNOSIS — Z13.1 DIABETES MELLITUS SCREENING: ICD-10-CM

## 2021-08-25 DIAGNOSIS — Z13.220 SCREENING FOR LIPID DISORDERS: ICD-10-CM

## 2021-08-25 DIAGNOSIS — Z12.5 PROSTATE CANCER SCREENING: ICD-10-CM

## 2021-08-25 DIAGNOSIS — M1A.0710 IDIOPATHIC CHRONIC GOUT OF RIGHT FOOT WITHOUT TOPHUS: ICD-10-CM

## 2021-08-25 PROBLEM — R03.0 PREHYPERTENSION: Status: RESOLVED | Noted: 2019-02-15 | Resolved: 2021-08-25

## 2021-08-25 LAB
ALBUMIN SERPL-MCNC: 4.1 G/DL (ref 3.5–5)
ALP SERPL-CCNC: 73 U/L (ref 45–120)
ALT SERPL W P-5'-P-CCNC: 16 U/L (ref 0–45)
ANION GAP SERPL CALCULATED.3IONS-SCNC: 12 MMOL/L (ref 5–18)
AST SERPL W P-5'-P-CCNC: 16 U/L (ref 0–40)
BILIRUB SERPL-MCNC: 0.6 MG/DL (ref 0–1)
BUN SERPL-MCNC: 13 MG/DL (ref 8–22)
CALCIUM SERPL-MCNC: 9.5 MG/DL (ref 8.5–10.5)
CHLORIDE BLD-SCNC: 103 MMOL/L (ref 98–107)
CHOLEST SERPL-MCNC: 212 MG/DL
CO2 SERPL-SCNC: 26 MMOL/L (ref 22–31)
CREAT SERPL-MCNC: 1.11 MG/DL (ref 0.7–1.3)
FASTING STATUS PATIENT QL REPORTED: YES
GFR SERPL CREATININE-BSD FRML MDRD: 81 ML/MIN/1.73M2
GLUCOSE BLD-MCNC: 84 MG/DL (ref 70–125)
HDLC SERPL-MCNC: 37 MG/DL
LDLC SERPL CALC-MCNC: 131 MG/DL
POTASSIUM BLD-SCNC: 3.9 MMOL/L (ref 3.5–5)
PROT SERPL-MCNC: 7.3 G/DL (ref 6–8)
PSA SERPL-MCNC: 0.71 UG/L (ref 0–2.5)
SODIUM SERPL-SCNC: 141 MMOL/L (ref 136–145)
TRIGL SERPL-MCNC: 220 MG/DL
URATE SERPL-MCNC: 8.2 MG/DL (ref 3–8)

## 2021-08-25 PROCEDURE — 80061 LIPID PANEL: CPT | Performed by: FAMILY MEDICINE

## 2021-08-25 PROCEDURE — 99396 PREV VISIT EST AGE 40-64: CPT | Performed by: FAMILY MEDICINE

## 2021-08-25 PROCEDURE — 80053 COMPREHEN METABOLIC PANEL: CPT | Performed by: FAMILY MEDICINE

## 2021-08-25 PROCEDURE — 84550 ASSAY OF BLOOD/URIC ACID: CPT | Performed by: FAMILY MEDICINE

## 2021-08-25 PROCEDURE — G0103 PSA SCREENING: HCPCS | Performed by: FAMILY MEDICINE

## 2021-08-25 PROCEDURE — 86803 HEPATITIS C AB TEST: CPT | Performed by: FAMILY MEDICINE

## 2021-08-25 PROCEDURE — 36415 COLL VENOUS BLD VENIPUNCTURE: CPT | Performed by: FAMILY MEDICINE

## 2021-08-25 ASSESSMENT — MIFFLIN-ST. JEOR: SCORE: 1847.69

## 2021-08-25 NOTE — ASSESSMENT & PLAN NOTE
Doing very well using colchicine prophylactically when needed.  Also discussed how to use the colchicine in times of an acute flare in which prophylaxis was missed or did not work.  Side effects precautions reviewed.  Will recheck uric acid level as well as kidney function and liver function given the treatment choice.

## 2021-08-25 NOTE — ASSESSMENT & PLAN NOTE
Outstanding reduction in weight and improvement in exercise routine over the past year.  Patient congratulated, discussed other strategies to aid in his goal of around 195 to 197 pounds.  Continue current efforts and congratulated.

## 2021-08-25 NOTE — PROGRESS NOTES
SUBJECTIVE:   CC: Chino Samuel is an 40 year old male who presents for preventative health visit.       Patient has been advised of split billing requirements and indicates understanding: Yes  Denies any chest pain, shortness of breath, dyspnea exertion, palpitations, nausea or vomiting.  Denies any changes in vision or hearing, or urinary or bowel habits.    Of note has had a more consistent exercise routine and has decreased alcohol intake and eliminated sodas and with that he has had an excellent reduction in his weight as well as his blood pressure.  He is no longer in the prehypertensive range.    Healthy Habits:     Getting at least 3 servings of Calcium per day:  Yes    Bi-annual eye exam:  Yes    Dental care twice a year:  Yes    Sleep apnea or symptoms of sleep apnea:  None    Diet:  Regular (no restrictions)    Frequency of exercise:  4-5 days/week    Duration of exercise:  15-30 minutes    Taking medications regularly:  Yes    Barriers to taking medications:  None    Medication side effects:  None    PHQ-2 Total Score: 0    Additional concerns today:  No      Today's PHQ-2 Score:   PHQ-2 (  [employer]) 2021   Q1: Little interest or pleasure in doing things 0   Q2: Feeling down, depressed or hopeless 0   PHQ-2 Score 0   Q1: Little interest or pleasure in doing things -   Q2: Feeling down, depressed or hopeless -   PHQ-2 Score -       Abuse: Current or Past(Physical, Sexual or Emotional)- No  Do you feel safe in your environment? Yes        Social History     Tobacco Use     Smoking status: Former Smoker     Packs/day: 0.50     Years: 3.00     Pack years: 1.50     Types: Cigarettes     Start date: 12/3/1996     Quit date: 7/10/2000     Years since quittin.1     Smokeless tobacco: Never Used     Tobacco comment: Brief cigarette usage   Substance Use Topics     Alcohol use: Yes     Alcohol/week: 0.0 standard drinks     Comment: Socially       Alcohol Use 2021   Prescreen: >3 drinks/day or >7  "drinks/week? No   Prescreen: >3 drinks/day or >7 drinks/week? -     Last PSA:   PSA   Date Value Ref Range Status   02/15/2019 0.77 0 - 4 ug/L Final     Comment:     Assay Method:  Chemiluminescence using Siemens Vista analyzer       Reviewed orders with patient. Reviewed health maintenance and updated orders accordingly - Yes  Lab work is in process    Reviewed and updated as needed this visit by clinical staff  Tobacco  Allergies  Meds  Problems  Med Hx  Surg Hx  Fam Hx  Soc Hx          Reviewed and updated as needed this visit by Provider  Tobacco  Allergies  Meds  Problems  Med Hx  Surg Hx  Fam Hx           Review of Systems   All other systems reviewed and are negative.        OBJECTIVE:   /74 (BP Location: Left arm, Patient Position: Sitting, Cuff Size: Adult Large)   Pulse 64   Temp 98.2  F (36.8  C) (Oral)   Resp 12   Ht 1.765 m (5' 9.5\")   Wt 94.9 kg (209 lb 4.8 oz)   BMI 30.47 kg/m      Physical Exam  Vitals and nursing note reviewed.   Constitutional:       General: He is not in acute distress.     Appearance: Normal appearance.   HENT:      Head: Normocephalic and atraumatic.      Right Ear: Tympanic membrane, ear canal and external ear normal.      Left Ear: Tympanic membrane, ear canal and external ear normal.      Nose: Nose normal.      Mouth/Throat:      Mouth: Mucous membranes are moist.      Pharynx: Oropharynx is clear. No oropharyngeal exudate.   Eyes:      General: No scleral icterus.     Extraocular Movements: Extraocular movements intact.      Conjunctiva/sclera: Conjunctivae normal.   Neck:      Vascular: No carotid bruit.   Cardiovascular:      Rate and Rhythm: Normal rate and regular rhythm.      Pulses: Normal pulses.      Heart sounds: Normal heart sounds. No murmur heard.   No friction rub. No gallop.    Pulmonary:      Effort: Pulmonary effort is normal.      Breath sounds: Normal breath sounds. No wheezing, rhonchi or rales.   Musculoskeletal:         " General: No swelling. Normal range of motion.      Cervical back: Normal range of motion.      Right lower leg: No edema.      Left lower leg: No edema.   Skin:     General: Skin is warm and dry.      Capillary Refill: Capillary refill takes less than 2 seconds.      Coloration: Skin is not jaundiced.      Findings: No rash.   Neurological:      General: No focal deficit present.      Mental Status: He is alert and oriented to person, place, and time.      Cranial Nerves: No cranial nerve deficit.      Gait: Gait is intact. Gait normal.      Deep Tendon Reflexes:      Reflex Scores:       Bicep reflexes are 2+ on the right side and 2+ on the left side.       Patellar reflexes are 2+ on the right side and 2+ on the left side.  Psychiatric:         Mood and Affect: Mood normal.         Thought Content: Thought content normal.         ASSESSMENT/PLAN:     Problem List Items Addressed This Visit        Endocrine    Idiopathic chronic gout of right foot without tophus     Doing very well using colchicine prophylactically when needed.  Also discussed how to use the colchicine in times of an acute flare in which prophylaxis was missed or did not work.  Side effects precautions reviewed.  Will recheck uric acid level as well as kidney function and liver function given the treatment choice.         Relevant Orders    Uric acid    Comprehensive metabolic panel       Other    Obesity, Class I, BMI 30-34.9     Outstanding reduction in weight and improvement in exercise routine over the past year.  Patient congratulated, discussed other strategies to aid in his goal of around 195 to 197 pounds.  Continue current efforts and congratulated.           Other Visit Diagnoses     Routine general medical examination at a health care facility    -  Primary    Need for hepatitis C screening test        As per USPSTF guidelines    Relevant Orders    Hepatitis C Screen Reflex to HCV RNA Quant and Genotype    Diabetes mellitus screening      "   Relevant Orders    Comprehensive metabolic panel    Screening for lipid disorders        Relevant Orders    Lipid panel reflex to direct LDL Fasting    Prostate cancer screening        Given family history, was recommended in 2019 that he start checking at the age of 40 with annual screening a PSA due to high risk.    Relevant Orders    Prostate Specific Antigen Screen          Patient has been advised of split billing requirements and indicates understanding: Yes  COUNSELING:   Reviewed preventive health counseling, as reflected in patient instructions       Regular exercise       Healthy diet/nutrition       Alcohol Use        Consider Hep C screening for all patients one time for ages 18-79 years       Prostate cancer screening       Advance Care Planning    Estimated body mass index is 30.47 kg/m  as calculated from the following:    Height as of this encounter: 1.765 m (5' 9.5\").    Weight as of this encounter: 94.9 kg (209 lb 4.8 oz).     Weight management plan: Discussed healthy diet and exercise guidelines    He reports that he quit smoking about 21 years ago. His smoking use included cigarettes. He started smoking about 24 years ago. He has a 1.50 pack-year smoking history. He has never used smokeless tobacco.      Counseling Resources:  ATP IV Guidelines  Pooled Cohorts Equation Calculator  FRAX Risk Assessment  ICSI Preventive Guidelines  Dietary Guidelines for Americans, 2010  USDA's MyPlate  ASA Prophylaxis  Lung CA Screening    Toni Freedman, [facility]  "

## 2021-08-26 LAB — HCV AB SERPL QL IA: NONREACTIVE

## 2021-10-23 DIAGNOSIS — M1A.0710 CHRONIC IDIOPATHIC GOUT INVOLVING TOE OF RIGHT FOOT WITHOUT TOPHUS: ICD-10-CM

## 2021-10-25 NOTE — TELEPHONE ENCOUNTER
"Routing refill request to provider for review/approval because:  Requested Prescriptions   Pending Prescriptions Disp Refills    colchicine (COLCYRS) 0.6 MG tablet [Pharmacy Med Name: COLCHICINE 0.6MG TABLETS] 60 tablet 0     Sig: TAKE 1 TABLET BY MOUTH TWICE DAILY        Gout Agents Protocol Failed - 10/23/2021  4:48 AM        Failed - CBC on file in past 12 months     Recent Labs   Lab Test 02/15/19  0746   WBC 6.8   RBC 5.20   HGB 15.9   HCT 46.5                    Failed - Has Uric Acid on file in past 12 months and value is less than 6     Recent Labs   Lab Test 08/25/21  1039   URIC 8.2*     If level is 6mg/dL or greater, ok to refill one time and refer to provider.           Passed - ALT on file in past 12 months     Recent Labs   Lab Test 08/25/21  1039   ALT 16             Passed - Recent (12 mo) or future (30 days) visit within the authorizing provider's specialty     Patient has had an office visit with the authorizing provider or a provider within the authorizing providers department within the previous 12 mos or has a future within next 30 days. See \"Patient Info\" tab in inbasket, or \"Choose Columns\" in Meds & Orders section of the refill encounter.              Passed - Medication is active on med list        Passed - Patient is age 18 or older        Passed - Normal serum creatinine on file in the past 12 months     Recent Labs   Lab Test 08/25/21  1039   CR 1.11       Ok to refill medication if creatinine is low                  Shonna WEBER, RN        "

## 2021-10-26 RX ORDER — COLCHICINE 0.6 MG/1
TABLET ORAL
Qty: 60 TABLET | Refills: 3 | Status: SHIPPED | OUTPATIENT
Start: 2021-10-26 | End: 2022-12-02

## 2022-08-29 ENCOUNTER — OFFICE VISIT (OUTPATIENT)
Dept: FAMILY MEDICINE | Facility: CLINIC | Age: 42
End: 2022-08-29
Payer: COMMERCIAL

## 2022-08-29 VITALS
BODY MASS INDEX: 31.25 KG/M2 | WEIGHT: 211 LBS | DIASTOLIC BLOOD PRESSURE: 86 MMHG | HEART RATE: 60 BPM | TEMPERATURE: 98.1 F | RESPIRATION RATE: 12 BRPM | HEIGHT: 69 IN | SYSTOLIC BLOOD PRESSURE: 118 MMHG

## 2022-08-29 DIAGNOSIS — Z13.6 CARDIOVASCULAR SCREENING; LDL GOAL LESS THAN 160: ICD-10-CM

## 2022-08-29 DIAGNOSIS — Z23 NEED FOR VACCINATION: Primary | ICD-10-CM

## 2022-08-29 DIAGNOSIS — M1A.0710 IDIOPATHIC CHRONIC GOUT OF RIGHT FOOT WITHOUT TOPHUS: ICD-10-CM

## 2022-08-29 DIAGNOSIS — Z00.00 ROUTINE GENERAL MEDICAL EXAMINATION AT A HEALTH CARE FACILITY: ICD-10-CM

## 2022-08-29 DIAGNOSIS — Z80.42 FAMILY HX OF PROSTATE CANCER: ICD-10-CM

## 2022-08-29 DIAGNOSIS — Z13.1 DIABETES MELLITUS SCREENING: ICD-10-CM

## 2022-08-29 DIAGNOSIS — Z12.5 PROSTATE CANCER SCREENING: ICD-10-CM

## 2022-08-29 LAB
ANION GAP SERPL CALCULATED.3IONS-SCNC: 12 MMOL/L (ref 7–15)
BUN SERPL-MCNC: 15.4 MG/DL (ref 6–20)
CALCIUM SERPL-MCNC: 10.1 MG/DL (ref 8.6–10)
CHLORIDE SERPL-SCNC: 101 MMOL/L (ref 98–107)
CHOLEST SERPL-MCNC: 205 MG/DL
CREAT SERPL-MCNC: 1.21 MG/DL (ref 0.67–1.17)
DEPRECATED HCO3 PLAS-SCNC: 25 MMOL/L (ref 22–29)
GFR SERPL CREATININE-BSD FRML MDRD: 76 ML/MIN/1.73M2
GLUCOSE SERPL-MCNC: 96 MG/DL (ref 70–99)
HDLC SERPL-MCNC: 30 MG/DL
LDLC SERPL CALC-MCNC: 109 MG/DL
NONHDLC SERPL-MCNC: 175 MG/DL
POTASSIUM SERPL-SCNC: 4.1 MMOL/L (ref 3.4–5.3)
PSA SERPL-MCNC: 0.61 NG/ML (ref 0–2.5)
SODIUM SERPL-SCNC: 138 MMOL/L (ref 136–145)
TRIGL SERPL-MCNC: 328 MG/DL
URATE SERPL-MCNC: 7.8 MG/DL (ref 3.4–7)

## 2022-08-29 PROCEDURE — 80048 BASIC METABOLIC PNL TOTAL CA: CPT | Performed by: FAMILY MEDICINE

## 2022-08-29 PROCEDURE — 80061 LIPID PANEL: CPT | Performed by: FAMILY MEDICINE

## 2022-08-29 PROCEDURE — G0103 PSA SCREENING: HCPCS | Performed by: FAMILY MEDICINE

## 2022-08-29 PROCEDURE — 36415 COLL VENOUS BLD VENIPUNCTURE: CPT | Performed by: FAMILY MEDICINE

## 2022-08-29 PROCEDURE — 99213 OFFICE O/P EST LOW 20 MIN: CPT | Mod: 25 | Performed by: FAMILY MEDICINE

## 2022-08-29 PROCEDURE — 90715 TDAP VACCINE 7 YRS/> IM: CPT | Performed by: FAMILY MEDICINE

## 2022-08-29 PROCEDURE — 84550 ASSAY OF BLOOD/URIC ACID: CPT | Performed by: FAMILY MEDICINE

## 2022-08-29 PROCEDURE — 90471 IMMUNIZATION ADMIN: CPT | Performed by: FAMILY MEDICINE

## 2022-08-29 PROCEDURE — 99396 PREV VISIT EST AGE 40-64: CPT | Mod: 25 | Performed by: FAMILY MEDICINE

## 2022-08-29 ASSESSMENT — ENCOUNTER SYMPTOMS
SORE THROAT: 0
PALPITATIONS: 0
DIARRHEA: 0
HEMATURIA: 0
WEAKNESS: 0
JOINT SWELLING: 0
SHORTNESS OF BREATH: 0
DIZZINESS: 0
ABDOMINAL PAIN: 0
NERVOUS/ANXIOUS: 0
PARESTHESIAS: 0
HEARTBURN: 0
ARTHRALGIAS: 0
MYALGIAS: 0
HEMATOCHEZIA: 0
NAUSEA: 0
CONSTIPATION: 0
CHILLS: 0
DYSURIA: 0
COUGH: 0
HEADACHES: 0
FEVER: 0
FREQUENCY: 0
EYE PAIN: 0

## 2022-08-29 NOTE — ASSESSMENT & PLAN NOTE
Doing very well with the use of reactive colchicine and at times using it prophylactically when taking trips.  We will continue with current plan.

## 2022-08-29 NOTE — PROGRESS NOTES
SUBJECTIVE:   CC: Chino Samuel is an 41 year old male who presents for preventative health visit.       Patient has been advised of split billing requirements and indicates understanding: Yes     Denies any chest pain, shortness of breath, dyspnea exertion, palpitations, nausea or vomiting.  Denies any changes in vision or hearing, or urinary or bowel habits.  Of note has had 2 gout flares in the past year otherwise keeping very well controlled.  Has an excellent exercise regimen 5 days a week with a roller.  Encouraged to continue.    Healthy Habits:     Getting at least 3 servings of Calcium per day:  Yes    Bi-annual eye exam:  Yes    Dental care twice a year:  Yes    Sleep apnea or symptoms of sleep apnea:  None    Diet:  Regular (no restrictions)    Frequency of exercise:  4-5 days/week    Duration of exercise:  15-30 minutes    Taking medications regularly:  Yes    Barriers to taking medications:  None    Medication side effects:  None    PHQ-2 Total Score: 0    Additional concerns today:  No          Today's PHQ-2 Score:   PHQ-2 (  [employer]) 2022   Q1: Little interest or pleasure in doing things 0   Q2: Feeling down, depressed or hopeless 0   PHQ-2 Score 0   PHQ-2 Total Score (12-17 Years)- Positive if 3 or more points; Administer PHQ-A if positive -   Q1: Little interest or pleasure in doing things Not at all   Q2: Feeling down, depressed or hopeless Not at all   PHQ-2 Score 0       Abuse: Current or Past(Physical, Sexual or Emotional)- No  Do you feel safe in your environment? Yes        Social History     Tobacco Use     Smoking status: Former Smoker     Packs/day: 0.50     Years: 3.00     Pack years: 1.50     Types: Cigarettes     Start date: 12/3/1996     Quit date: 7/10/2000     Years since quittin.1     Smokeless tobacco: Never Used     Tobacco comment: Brief cigarette usage   Substance Use Topics     Alcohol use: Yes     Alcohol/week: 0.0 standard drinks     Comment: Socially  "      Alcohol Use 8/29/2022   Prescreen: >3 drinks/day or >7 drinks/week? No   Prescreen: >3 drinks/day or >7 drinks/week? -     Last PSA:   PSA   Date Value Ref Range Status   02/15/2019 0.77 0 - 4 ug/L Final     Comment:     Assay Method:  Chemiluminescence using Siemens Vista analyzer     Prostate Specific Antigen Screen   Date Value Ref Range Status   08/25/2021 0.71 0.00 - 2.50 ug/L Final       Reviewed orders with patient. Reviewed health maintenance and updated orders accordingly - Yes  Lab work is in process    Reviewed and updated as needed this visit by clinical staff   Tobacco  Allergies  Meds  Problems  Med Hx  Surg Hx  Fam Hx  Soc   Hx          Reviewed and updated as needed this visit by Provider   Tobacco  Allergies  Meds  Problems  Med Hx  Surg Hx  Fam Hx             Review of Systems   Constitutional: Negative for chills and fever.   HENT: Negative for congestion, ear pain, hearing loss and sore throat.    Eyes: Negative for pain and visual disturbance.   Respiratory: Negative for cough and shortness of breath.    Cardiovascular: Negative for chest pain, palpitations and peripheral edema.   Gastrointestinal: Negative for abdominal pain, constipation, diarrhea, heartburn, hematochezia and nausea.   Genitourinary: Negative for dysuria, frequency, genital sores, hematuria and urgency.   Musculoskeletal: Negative for arthralgias, joint swelling and myalgias.   Skin: Negative for rash.   Neurological: Negative for dizziness, weakness, headaches and paresthesias.   Psychiatric/Behavioral: Negative for mood changes. The patient is not nervous/anxious.        OBJECTIVE:   /86 (BP Location: Left arm, Patient Position: Sitting, Cuff Size: Adult Large)   Pulse 60   Temp 98.1  F (36.7  C) (Oral)   Resp 12   Ht 1.759 m (5' 9.25\")   Wt 95.7 kg (211 lb)   BMI 30.93 kg/m      Physical Exam  Vitals and nursing note reviewed.   Constitutional:       General: He is not in acute distress.     " Appearance: Normal appearance.   HENT:      Head: Normocephalic and atraumatic.      Right Ear: Tympanic membrane, ear canal and external ear normal.      Left Ear: Tympanic membrane, ear canal and external ear normal.      Nose: Nose normal.      Mouth/Throat:      Mouth: Mucous membranes are moist.      Pharynx: Oropharynx is clear. No oropharyngeal exudate.   Eyes:      General: No scleral icterus.     Extraocular Movements: Extraocular movements intact.      Conjunctiva/sclera: Conjunctivae normal.   Neck:      Vascular: No carotid bruit.   Cardiovascular:      Rate and Rhythm: Normal rate and regular rhythm.      Pulses: Normal pulses.      Heart sounds: Normal heart sounds. No murmur heard.    No friction rub. No gallop.   Pulmonary:      Effort: Pulmonary effort is normal.      Breath sounds: Normal breath sounds. No wheezing, rhonchi or rales.   Musculoskeletal:         General: No swelling. Normal range of motion.      Cervical back: Normal range of motion.      Right lower leg: No edema.      Left lower leg: No edema.   Skin:     General: Skin is warm and dry.      Capillary Refill: Capillary refill takes less than 2 seconds.      Coloration: Skin is not jaundiced.      Findings: No rash.   Neurological:      General: No focal deficit present.      Mental Status: He is alert and oriented to person, place, and time.      Cranial Nerves: No cranial nerve deficit.      Gait: Gait is intact. Gait normal.      Deep Tendon Reflexes:      Reflex Scores:       Bicep reflexes are 2+ on the right side and 2+ on the left side.       Patellar reflexes are 2+ on the right side and 2+ on the left side.  Psychiatric:         Mood and Affect: Mood normal.         Thought Content: Thought content normal.         ASSESSMENT/PLAN:     Problem List Items Addressed This Visit        Medium    Idiopathic chronic gout of right foot without tophus     Doing very well with the use of reactive colchicine and at times using it  "prophylactically when taking trips.  We will continue with current plan.           Relevant Orders    Basic metabolic panel    Uric acid    CARDIOVASCULAR SCREENING; LDL GOAL LESS THAN 160    Relevant Orders    Lipid panel reflex to direct LDL Fasting    Family hx of prostate cancer     Given his family history, was recommended start PSA screening age 38.  We will continue.           Relevant Orders    PSA, screen      Other Visit Diagnoses     Need for vaccination    -  Primary    Relevant Orders    TDAP VACCINE (Adacel, Boostrix) (Completed)    Routine general medical examination at a health care facility        Diabetes mellitus screening        Relevant Orders    Basic metabolic panel    Prostate cancer screening        Relevant Orders    PSA, screen          Patient has been advised of split billing requirements and indicates understanding: Yes    COUNSELING:   Reviewed preventive health counseling, as reflected in patient instructions       Regular exercise       Healthy diet/nutrition       Advance Care Planning    Estimated body mass index is 30.93 kg/m  as calculated from the following:    Height as of this encounter: 1.759 m (5' 9.25\").    Weight as of this encounter: 95.7 kg (211 lb).     Weight management plan: Discussed healthy diet and exercise guidelines    He reports that he quit smoking about 22 years ago. His smoking use included cigarettes. He started smoking about 25 years ago. He has a 1.50 pack-year smoking history. He has never used smokeless tobacco.      Counseling Resources:  ATP IV Guidelines  Pooled Cohorts Equation Calculator  FRAX Risk Assessment  ICSI Preventive Guidelines  Dietary Guidelines for Americans, 2010  USDA's MyPlate  ASA Prophylaxis  Lung CA Screening    Toni Freedman [facility]  "

## 2022-12-02 DIAGNOSIS — M1A.0710 CHRONIC IDIOPATHIC GOUT INVOLVING TOE OF RIGHT FOOT WITHOUT TOPHUS: ICD-10-CM

## 2022-12-02 RX ORDER — COLCHICINE 0.6 MG/1
0.6 TABLET ORAL 2 TIMES DAILY
Qty: 60 TABLET | Refills: 3 | Status: SHIPPED | OUTPATIENT
Start: 2022-12-02 | End: 2024-02-23

## 2022-12-02 NOTE — TELEPHONE ENCOUNTER
"Routing refill request to provider for review/approval because:  CBC and ALT not current   Uric and Cr out of range     Last Written Prescription Date: 10/26/21  Last Fill Quantity: 60, # refills: 3  Last office visit provider: 8/29/22    Requested Prescriptions   Pending Prescriptions Disp Refills     colchicine (COLCYRS) 0.6 MG tablet 60 tablet 3     Sig: Take 1 tablet (0.6 mg) by mouth 2 times daily       Gout Agents Protocol Failed - 12/2/2022 12:40 PM        Failed - CBC on file in past 12 months     Recent Labs   Lab Test 02/15/19  0746   WBC 6.8   RBC 5.20   HGB 15.9   HCT 46.5                    Failed - ALT on file in past 12 months     Recent Labs   Lab Test 08/25/21  1039   ALT 16             Failed - Has Uric Acid on file in past 12 months and value is less than 6     Recent Labs   Lab Test 08/29/22  0746   URIC 7.8*     If level is 6mg/dL or greater, ok to refill one time and refer to provider.           Failed - Normal serum creatinine on file in the past 12 months     Recent Labs   Lab Test 08/29/22  0746   CR 1.21*       Ok to refill medication if creatinine is low          Passed - Recent (12 mo) or future (30 days) visit within the authorizing provider's specialty     Patient has had an office visit with the authorizing provider or a provider within the authorizing providers department within the previous 12 mos or has a future within next 30 days. See \"Patient Info\" tab in inbasket, or \"Choose Columns\" in Meds & Orders section of the refill encounter.              Passed - Medication is active on med list        Passed - Patient is age 18 or older             Angela Johnson RN 12/02/22 12:46 PM  "

## 2022-12-02 NOTE — TELEPHONE ENCOUNTER
Prescribing provider has retired.    Will forward refill request for colchicine (COLCYRS) 0.6 MG tablet to PCP

## 2023-09-10 ASSESSMENT — ENCOUNTER SYMPTOMS
FREQUENCY: 0
PALPITATIONS: 0
ABDOMINAL PAIN: 0
NERVOUS/ANXIOUS: 0
HEMATURIA: 0
ARTHRALGIAS: 0
EYE PAIN: 0
CONSTIPATION: 0
PARESTHESIAS: 0
HEARTBURN: 0
FEVER: 0
CHILLS: 0
DYSURIA: 0
SORE THROAT: 0
SHORTNESS OF BREATH: 0
DIARRHEA: 0
HEMATOCHEZIA: 0
MYALGIAS: 0
DIZZINESS: 0
HEADACHES: 0
COUGH: 0
JOINT SWELLING: 0
NAUSEA: 0
WEAKNESS: 0

## 2023-09-15 ENCOUNTER — OFFICE VISIT (OUTPATIENT)
Dept: FAMILY MEDICINE | Facility: CLINIC | Age: 43
End: 2023-09-15
Attending: FAMILY MEDICINE
Payer: COMMERCIAL

## 2023-09-15 VITALS
RESPIRATION RATE: 16 BRPM | OXYGEN SATURATION: 96 % | WEIGHT: 214.1 LBS | DIASTOLIC BLOOD PRESSURE: 88 MMHG | HEART RATE: 67 BPM | BODY MASS INDEX: 31.71 KG/M2 | HEIGHT: 69 IN | SYSTOLIC BLOOD PRESSURE: 133 MMHG

## 2023-09-15 DIAGNOSIS — Z13.220 LIPID SCREENING: ICD-10-CM

## 2023-09-15 DIAGNOSIS — Z80.42 FAMILY HX OF PROSTATE CANCER: ICD-10-CM

## 2023-09-15 DIAGNOSIS — Z13.1 DIABETES MELLITUS SCREENING: ICD-10-CM

## 2023-09-15 DIAGNOSIS — Z00.00 ROUTINE GENERAL MEDICAL EXAMINATION AT A HEALTH CARE FACILITY: Primary | ICD-10-CM

## 2023-09-15 DIAGNOSIS — R06.83 SNORING: ICD-10-CM

## 2023-09-15 LAB
ANION GAP SERPL CALCULATED.3IONS-SCNC: 12 MMOL/L (ref 7–15)
BUN SERPL-MCNC: 9.9 MG/DL (ref 6–20)
CALCIUM SERPL-MCNC: 9.3 MG/DL (ref 8.6–10)
CHLORIDE SERPL-SCNC: 104 MMOL/L (ref 98–107)
CHOLEST SERPL-MCNC: 215 MG/DL
CREAT SERPL-MCNC: 1.19 MG/DL (ref 0.67–1.17)
DEPRECATED HCO3 PLAS-SCNC: 27 MMOL/L (ref 22–29)
EGFRCR SERPLBLD CKD-EPI 2021: 77 ML/MIN/1.73M2
GLUCOSE SERPL-MCNC: 94 MG/DL (ref 70–99)
HDLC SERPL-MCNC: 32 MG/DL
LDLC SERPL CALC-MCNC: 141 MG/DL
NONHDLC SERPL-MCNC: 183 MG/DL
POTASSIUM SERPL-SCNC: 4.3 MMOL/L (ref 3.4–5.3)
PSA SERPL DL<=0.01 NG/ML-MCNC: 0.66 NG/ML (ref 0–2.5)
SODIUM SERPL-SCNC: 143 MMOL/L (ref 136–145)
TRIGL SERPL-MCNC: 208 MG/DL

## 2023-09-15 PROCEDURE — 80048 BASIC METABOLIC PNL TOTAL CA: CPT | Performed by: FAMILY MEDICINE

## 2023-09-15 PROCEDURE — G0103 PSA SCREENING: HCPCS | Performed by: FAMILY MEDICINE

## 2023-09-15 PROCEDURE — 99396 PREV VISIT EST AGE 40-64: CPT | Performed by: FAMILY MEDICINE

## 2023-09-15 PROCEDURE — 80061 LIPID PANEL: CPT | Performed by: FAMILY MEDICINE

## 2023-09-15 PROCEDURE — 36415 COLL VENOUS BLD VENIPUNCTURE: CPT | Performed by: FAMILY MEDICINE

## 2023-09-15 ASSESSMENT — ENCOUNTER SYMPTOMS
EYE PAIN: 0
FEVER: 0
NERVOUS/ANXIOUS: 0
COUGH: 0
CONSTIPATION: 0
HEARTBURN: 0
HEMATOCHEZIA: 0
ARTHRALGIAS: 0
PALPITATIONS: 0
MYALGIAS: 0
SHORTNESS OF BREATH: 0
FREQUENCY: 0
DIZZINESS: 0
JOINT SWELLING: 0
HEMATURIA: 0
SORE THROAT: 0
DIARRHEA: 0
WEAKNESS: 0
PARESTHESIAS: 0
DYSURIA: 0
HEADACHES: 0
ABDOMINAL PAIN: 0
NAUSEA: 0
CHILLS: 0

## 2023-09-15 NOTE — PROGRESS NOTES
SUBJECTIVE:   CC: Chino is an 42 year old who presents for preventative health visit.       9/15/2023     8:21 AM   Additional Questions   Roomed by Pavan Garcia MA   Accompanied by Self         9/15/2023     8:21 AM   Patient Reported Additional Medications   Patient reports taking the following new medications None       Overall doing well.Denies any chest pain, shortness of breath, dyspnea exertion, palpitations, nausea or vomiting.  Denies any changes in vision or hearing, or urinary or bowel habits.    However his wife had an appointment earlier in the day and did mention that he snores quite loudly to the point that likes others, and she has noticed that there seems to be stoppage of breathing while sleeping.    Healthy Habits:     Getting at least 3 servings of Calcium per day:  Yes    Bi-annual eye exam:  Yes    Dental care twice a year:  Yes    Sleep apnea or symptoms of sleep apnea:  None    Diet:  Regular (no restrictions)    Frequency of exercise:  4-5 days/week    Duration of exercise:  15-30 minutes    Taking medications regularly:  Yes    Medication side effects:  Other    Additional concerns today:  No      Today's PHQ-2 Score:       9/15/2023     8:20 AM   PHQ-2 (  [employer])   Q1: Little interest or pleasure in doing things 0   Q2: Feeling down, depressed or hopeless 0   PHQ-2 Score 0   Q1: Little interest or pleasure in doing things Not at all   Q2: Feeling down, depressed or hopeless Not at all   PHQ-2 Score 0         Social History     Tobacco Use    Smoking status: Former     Packs/day: 0.50     Years: 3.00     Pack years: 1.50     Types: Cigarettes     Start date: 12/3/1996     Quit date: 7/10/2000     Years since quittin.1    Smokeless tobacco: Never    Tobacco comments:     Brief cigarette usage   Substance Use Topics    Alcohol use: Not Currently     Comment: Socially             9/10/2023     7:40 AM   Alcohol Use   Prescreen: >3 drinks/day or >7 drinks/week? No       Last PSA:  "  PSA   Date Value Ref Range Status   02/15/2019 0.77 0 - 4 ug/L Final     Comment:     Assay Method:  Chemiluminescence using Siemens Vista analyzer     Prostate Specific Antigen Screen   Date Value Ref Range Status   08/29/2022 0.61 0.00 - 2.50 ng/mL Final   08/25/2021 0.71 0.00 - 2.50 ug/L Final       Reviewed orders with patient. Reviewed health maintenance and updated orders accordingly - Yes  Lab work is in process  Labs reviewed in EPIC    Reviewed and updated as needed this visit by clinical staff   Tobacco  Allergies  Meds  Problems  Med Hx  Surg Hx  Fam Hx  Soc   Hx        Reviewed and updated as needed this visit by Provider   Tobacco  Allergies  Meds  Problems  Med Hx  Surg Hx  Fam Hx           Review of Systems   Constitutional:  Negative for chills and fever.   HENT:  Negative for congestion, ear pain, hearing loss and sore throat.    Eyes:  Negative for pain and visual disturbance.   Respiratory:  Negative for cough and shortness of breath.    Cardiovascular:  Negative for chest pain, palpitations and peripheral edema.   Gastrointestinal:  Negative for abdominal pain, constipation, diarrhea, heartburn, hematochezia and nausea.   Genitourinary:  Negative for dysuria, frequency, genital sores, hematuria, impotence, penile discharge and urgency.   Musculoskeletal:  Negative for arthralgias, joint swelling and myalgias.   Skin:  Negative for rash.   Neurological:  Negative for dizziness, weakness, headaches and paresthesias.   Psychiatric/Behavioral:  Negative for mood changes. The patient is not nervous/anxious.        OBJECTIVE:   /88 (BP Location: Left arm, Patient Position: Sitting, Cuff Size: Adult Large)   Pulse 67   Resp 16   Ht 1.759 m (5' 9.25\")   Wt 97.1 kg (214 lb 1.6 oz)   SpO2 96%   BMI 31.39 kg/m      Physical Exam  Vitals and nursing note reviewed.   Constitutional:       General: He is not in acute distress.     Appearance: Normal appearance.   HENT:      Head: " Normocephalic and atraumatic.      Right Ear: Tympanic membrane, ear canal and external ear normal.      Left Ear: Tympanic membrane, ear canal and external ear normal.      Nose: Nose normal.      Mouth/Throat:      Mouth: Mucous membranes are moist.      Pharynx: Oropharynx is clear. No oropharyngeal exudate.   Eyes:      General: No scleral icterus.     Extraocular Movements: Extraocular movements intact.      Conjunctiva/sclera: Conjunctivae normal.   Neck:      Vascular: No carotid bruit.   Cardiovascular:      Rate and Rhythm: Normal rate and regular rhythm.      Pulses: Normal pulses.      Heart sounds: Normal heart sounds. No murmur heard.     No friction rub. No gallop.   Pulmonary:      Effort: Pulmonary effort is normal.      Breath sounds: Normal breath sounds. No wheezing, rhonchi or rales.   Musculoskeletal:         General: No swelling. Normal range of motion.      Cervical back: Normal range of motion.      Right lower leg: No edema.      Left lower leg: No edema.   Skin:     General: Skin is warm and dry.      Capillary Refill: Capillary refill takes less than 2 seconds.      Coloration: Skin is not jaundiced.      Findings: No rash.   Neurological:      General: No focal deficit present.      Mental Status: He is alert and oriented to person, place, and time.      Cranial Nerves: No cranial nerve deficit.      Gait: Gait is intact. Gait normal.      Deep Tendon Reflexes:      Reflex Scores:       Bicep reflexes are 2+ on the right side and 2+ on the left side.       Patellar reflexes are 2+ on the right side and 2+ on the left side.  Psychiatric:         Mood and Affect: Mood normal.         Thought Content: Thought content normal.         ASSESSMENT/PLAN:     Problem List Items Addressed This Visit       Family hx of prostate cancer     Given his family history of prostate cancer, as per guidance will continue to check PSA yearly.         Relevant Orders    Prostate Specific Antigen Screen     "Snoring     Given the 17 inch neck circumference, snoring that wakes other, witnessed apnea by spouse, will send to sleep clinic for further evaluation.         Relevant Orders    Adult Sleep Eval & Management  Referral     Other Visit Diagnoses       Routine general medical examination at a health care facility    -  Primary    Lipid screening        Relevant Orders    Lipid panel reflex to direct LDL Fasting    Diabetes mellitus screening        Relevant Orders    Basic metabolic panel            Patient has been advised of split billing requirements and indicates understanding: Yes      COUNSELING:   Reviewed preventive health counseling, as reflected in patient instructions       Regular exercise       Healthy diet/nutrition       Prostate cancer screening       Advance Care Planning      BMI:   Estimated body mass index is 31.39 kg/m  as calculated from the following:    Height as of this encounter: 1.759 m (5' 9.25\").    Weight as of this encounter: 97.1 kg (214 lb 1.6 oz).   Weight management plan: Discussed healthy diet and exercise guidelines      He reports that he quit smoking about 23 years ago. His smoking use included cigarettes. He started smoking about 26 years ago. He has a 1.50 pack-year smoking history. He has never used smokeless tobacco.            Toni Freedman,   [facility]  "

## 2023-09-15 NOTE — ASSESSMENT & PLAN NOTE
Given the 17 inch neck circumference, snoring that wakes other, witnessed apnea by spouse, will send to sleep clinic for further evaluation.

## 2024-01-21 ASSESSMENT — SLEEP AND FATIGUE QUESTIONNAIRES
HOW LIKELY ARE YOU TO NOD OFF OR FALL ASLEEP WHILE LYING DOWN TO REST IN THE AFTERNOON WHEN CIRCUMSTANCES PERMIT: SLIGHT CHANCE OF DOZING
HOW LIKELY ARE YOU TO NOD OFF OR FALL ASLEEP WHILE SITTING QUIETLY AFTER LUNCH WITHOUT ALCOHOL: SLIGHT CHANCE OF DOZING
HOW LIKELY ARE YOU TO NOD OFF OR FALL ASLEEP WHEN YOU ARE A PASSENGER IN A CAR FOR AN HOUR WITHOUT A BREAK: SLIGHT CHANCE OF DOZING
HOW LIKELY ARE YOU TO NOD OFF OR FALL ASLEEP IN A CAR, WHILE STOPPED FOR A FEW MINUTES IN TRAFFIC: WOULD NEVER DOZE
HOW LIKELY ARE YOU TO NOD OFF OR FALL ASLEEP WHILE SITTING AND TALKING TO SOMEONE: WOULD NEVER DOZE
HOW LIKELY ARE YOU TO NOD OFF OR FALL ASLEEP WHILE WATCHING TV: MODERATE CHANCE OF DOZING
HOW LIKELY ARE YOU TO NOD OFF OR FALL ASLEEP WHILE SITTING AND READING: SLIGHT CHANCE OF DOZING
HOW LIKELY ARE YOU TO NOD OFF OR FALL ASLEEP WHILE SITTING INACTIVE IN A PUBLIC PLACE: WOULD NEVER DOZE

## 2024-01-22 ENCOUNTER — OFFICE VISIT (OUTPATIENT)
Dept: SLEEP MEDICINE | Facility: CLINIC | Age: 44
End: 2024-01-22
Attending: FAMILY MEDICINE
Payer: COMMERCIAL

## 2024-01-22 VITALS
DIASTOLIC BLOOD PRESSURE: 91 MMHG | HEIGHT: 69 IN | OXYGEN SATURATION: 96 % | BODY MASS INDEX: 32.47 KG/M2 | SYSTOLIC BLOOD PRESSURE: 143 MMHG | WEIGHT: 219.2 LBS | HEART RATE: 76 BPM

## 2024-01-22 DIAGNOSIS — G47.33 OBSTRUCTIVE SLEEP APNEA: Primary | ICD-10-CM

## 2024-01-22 DIAGNOSIS — R06.83 SNORING: ICD-10-CM

## 2024-01-22 PROCEDURE — 99203 OFFICE O/P NEW LOW 30 MIN: CPT | Performed by: STUDENT IN AN ORGANIZED HEALTH CARE EDUCATION/TRAINING PROGRAM

## 2024-01-22 NOTE — ASSESSMENT & PLAN NOTE
STOP BANG score is 5/8. Patient likely has sleep apnea based on clinical history of snoring, EDS(ESS 6/24), witnessed apneas, neck circumference, gender, insomnia(KOLTON 14/28), nocturia, morning dry mouth, and morning headaches.   Obstructive sleep apnea reviewed. Complications of Untreated Sleep Apnea Reviewed.  HST/ Polysomnography reviewed. Information provided regarding treatment options for CASSIE.  Recommend HST to evaluate for CASSIE

## 2024-01-22 NOTE — PATIENT INSTRUCTIONS
"MY INFORMATION ON SLEEP APNEA-  [place] Samuel    DOCTOR : John Bashir MD  SLEEP CENTER :      MY CONTACT NUMBER:    [facility]   [phone]  [facility]      Am I having a home sleep study?  Disposable device sent out require phone/computer application, [email or web address]: https://[email or web address].com/watch?v=BCce_vbiwxE  The sleep lab will call you for this appointment   If you wish to reschedule the sleep study or contact the sleep lab scheduling call [phone]        Frequently asked questions:  1. What is Obstructive Sleep Apnea (CASSIE)? CASSIE is the most common type of sleep apnea. Apnea means, \"without breath.\"  Apnea is most often caused by narrowing or collapse of the upper airway as muscles relax during sleep.   Almost everyone has occasional apneas. Most people with sleep apnea have had brief interruptions at night frequently for many years.  The severity of sleep apnea is related to how frequent and severe the events are.   Apneas are any events where there is no breathing.  Hypopneas are any events where there is shallow breathing. Sleep studies will track and then add all of the apneas and hypopneas into a total and then divided this number by the total time asleep to obtain the apnea hypopnea index(AHI). 0-5 events/per hour = No Sleep Apnea; 5-15 events/per hour = Mild Sleep Apnea; 15-30 events/per hour = Moderate Sleep Apnea; Greater than 30 events/per hour = Severe Sleep Apnea.  Sleep apnea is typically worse during REM sleep due to complete muscle relaxation, including the muscles of the airway. Sleep apnea is also typically worse during supine(sleeping on your back) sleep due to internal structures more easily falling on the top of the airway and external pressures more easily crushing the airway.  The respiratory disturbance index(RDI) includes apneas, hypopneas, and other respiratory events such as snoring that may disturb your sleep.  2. What are the " consequences of CASSIE? Symptoms include: feeling sleepy during the day, snoring loudly, gasping or stopping of breathing, trouble sleeping, and occasionally morning headaches or heartburn at night.  Sleepiness can be serious and even increase the risk of falling asleep while driving. Other health consequences may include development of high blood pressure and other cardiovascular disease in persons who are susceptible. Untreated CASSIE  can contribute to heart disease, stroke and diabetes.   3. What are the treatment options? In most situations, sleep apnea is a lifelong disease that must be managed with daily therapy. Medications are not effective for sleep apnea and surgery is generally not considered until other therapies have been tried. Your treatment is your choice . Continuous Positive Airway (CPAP) works right away and is the therapy that is effective in nearly everyone. An oral device to hold your jaw forward is usually the next most reliable option. Other options include postioning devices (to keep you off your back), weight loss, and surgery including a tongue pacing device. There is more detail about some of these options below.    Important tips for using CPAP and similar devices   Know your equipment:  CPAP is continuous positive airway pressure that prevents obstructive sleep apnea by keeping the throat from collapsing while you are sleeping. In most cases, the device is  smart  and can slowly self-adjusts if your throat collapses and keeps a record every day of how well you are treated-this information is available to you and your care team.  BPAP is bilevel positive airway pressure that keeps your throat open and also assists each breath with a pressure boost to maintain adequate breathing.  Special kinds of BPAP are used in patients who have inadequate breathing from lung or heart disease. In most cases, the device is  smart  and can slowly self-adjusts to assist breathing. Like CPAP, the device keeps a  record of how well you are treated.  Your mask is your connection to the device. You get to choose what feels most comfortable and the staff will help to make sure if fits. Here: are some examples of the different masks that are available:       Key points to remember on your journey with sleep apnea:  Sleep study.  PAP devices often need to be adjusted during a sleep study to show that they are effective and adjusted right.  Good tips to remember: Try wearing just the mask during a quiet time during the day so your body adapts to wearing it. A humidifier is recommended for comfort in most cases to prevent drying of your nose and throat. Allergy medication from your provider may help you if you are having nasal congestion.  Getting settled-in. It takes more than one night for most of us to get used to wearing a mask. Try wearing just the mask during a quiet time during the day so your body adapts to wearing it. A humidifier is recommended for comfort in most cases. Our team will work with you carefully on the first day and will be in contact within 4 days and again at 2 and 4 weeks for advice and remote device adjustments. Your therapy is evaluated by the device each day.   Use it every night. The more you are able to sleep naturally for 7-8 hours, the more likely you will have good sleep and to prevent health risks or symptoms from sleep apnea. Even if you use it 4 hours it helps. Occasionally all of us are unable to use a medical therapy, in sleep apnea, it is not dangerous to miss one night.   Communicate. Call our skilled team on the number provided on the first day if your visit for problems that make it difficult to wear the device. Over 2 out of 3 patients can learn to wear the device long-term with help from our team. Remember to call our team or your sleep providers if you are unable to wear the device as we may have other solutions for those who cannot adapt to mask CPAP therapy. It is recommended that you  sleep your sleep provider within the first 3 months and yearly after that if you are not having problems.   Take care of your equipment. Make sure you clean your mask and tubing using directions every day and that your filter and mask are replaced as recommended or if they are not working.     BESIDES CPAP, WHAT OTHER THERAPIES ARE THERE?    Positioning Device  Positioning devices are generally used when sleep apnea is mild and only occurs on your back.This example shows a pillow that straps around the waist. It may be appropriate for those whose sleep study shows milder sleep apnea that occurs primarily when lying flat on one's back. Preliminary studies have shown benefit but effectiveness at home may need to be verified by a home sleep test. These devices are generally not covered by medical insurance.    Oral Appliance  What is oral appliance therapy?  An oral appliance device fits on your teeth at night like a retainer used after having braces. The device is made by a specialized dentist and requires several visits over 1-2 months before a manufactured device is made to fit your teeth and is adjusted to prevent your sleep apnea. Once an oral device is working properly, snoring should be improved. A home sleep test may be recommended at that time if to determine whether the sleep apnea is adequately treated.       Some things to remember:  -Oral devices are often, but not always, covered by your medical insurance. Be sure to check with your insurance provider.   -If you are referred for oral therapy, you will be given a list of specialized dentists to consider or you may choose to visit the Web site of the American Academy of Dental Sleep Medicine  -Oral devices are less likely to work if you have severe sleep apnea or are extremely overweight.     More detailed information  An oral appliance is a small acrylic device that fits over the upper and lower teeth  (similar to a retainer or a mouth guard). This device  slightly moves jaw forward, which moves the base of the tongue forward, opens the airway, improves breathing for effective treat snoring and obstructive sleep apnea in perhaps 7 out of 10 people .  The best working devices are custom-made by a dental device  after a mold is made of the teeth 1, 2, 3.  When is an oral appliance indicated?  Oral appliance therapy is recommended as a first-line treatment for patients with primary snoring, mild sleep apnea, and for patients with moderate sleep apnea who prefer appliance therapy to use of CPAP4, 5. Severity of sleep apnea is determined by sleep testing and is based on the number of respiratory events per hour of sleep.   How successful is oral appliance therapy?  The success rate of oral appliance therapy in patients with mild sleep apnea is 75-80% while in patients with moderate sleep apnea it is 50-70%. The chance of success in patients with severe sleep apnea is 40-50%. The research also shows that oral appliances have a beneficial effect on the cardiovascular health of CASSIE patients at the same magnitude as CPAP therapy7.  Oral appliances should be a second-line treatment in cases of severe sleep apnea, but if not completely successful then a combination therapy utilizing CPAP plus oral appliance therapy may be effective. Oral appliances tend to be effective in a broad range of patients although studies show that the patients who have the highest success are females, younger patients, those with milder disease, and less severe obesity. 3, 6.   Finding a dentist that practices dental sleep medicine  Specific training is available through the American Academy of Dental Sleep Medicine for dentists interested in working in the field of sleep. To find a dentist who is educated in the field of sleep and the use of oral appliances, near you, visit the Web site of the American Academy of Dental Sleep Medicine.    References  1. geremias Guerrero al. Objectively  measured vs self-reported compliance during oral appliance therapy for sleep-disordered breathing. Chest 2013; 144(5): 9188-9874.  2. Nikolai, et al. Objective measurement of compliance during oral appliance therapy for sleep-disordered breathing. Thorax 2013; 68(1): 91-96.  3. Rubén et al. Mandibular advancement devices in 620 men and women with CASSIE and snoring: tolerability and predictors of treatment success. Chest 2004; 125: 9740-6498.  4. Vanessa et al. Oral appliances for snoring and CASSIE: a review. Sleep 2006; 29: 244-262.  5. Tacos et al. Oral appliance treatment for CASSIE: an update. J Clin Sleep Med 2014; 10(2): 215-227.  6. Alycia et al. Predictors of OSAH treatment outcome. J Dent Res 2007; 86: 8391-6390.      Weight Loss:    Weight loss is a long-term strategy that may improve sleep apnea in some patients.    Weight management is a personal decision.  If you are interested in exploring weight loss strategies, the following discussion covers the impact on weight loss on sleep apnea and the approaches that may be successful.    Weight loss decreases severity of sleep apnea in most people with obesity. For those with mild obesity who have developed snoring with weight gain, even 15-30 pound weight loss can improve and occasionally eliminate sleep apnea.  Structured and life-long dietary and health habits are necessary to lose weight and keep healthier weight levels.     Though there may be significant health benefits from weight loss, long-term weight loss is very difficult to achieve- studies show success with dietary management in less than 10% of people. In addition, substantial weight loss may require years of dietary control and may be difficult if patients have severe obesity. In these cases, surgical management may be considered.  Finally, older individuals who have tolerated obesity without health complications may be less likely to benefit from weight loss strategies.    Your BMI is  Body mass index is 32.14 kg/m .    Weight management plan: Discussed healthy diet and exercise guidelines    Surgery:    Surgery for obstructive sleep apnea is considered generally only when other therapies fail to work. Surgery may be discussed with you if you are having a difficult time tolerating CPAP and or when there is an abnormal structure that requires surgical correction.  Nose and throat surgeries often enlarge the airway to prevent collapse.  Most of these surgeries create pain for 1-2 weeks and up to half of the most common surgeries are not effective throughout life.  You should carefully discuss the benefits and drawbacks to surgery with your sleep provider and surgeon to determine if it is the best solution for you.   More information  Surgery for CASSIE is directed at areas that are responsible for narrowing or complete obstruction of the airway during sleep.  There are a wide range of procedures available to enlarge and/or stabilize the airway to prevent blockage of breathing in the three major areas where it can occur: the palate, tongue, and nasal regions.  Successful surgical treatment depends on the accurate identification of the factors responsible for obstructive sleep apnea in each person.  A personalized approach is required because there is no single treatment that works well for everyone.  Because of anatomic variation, consultation with an examination by a sleep surgeon is a critical first step in determining what surgical options are best for each patient.  In some cases, examination during sedation may be recommended in order to guide the selection of procedures.  Patients will be counseled about risks and benefits as well as the typical recovery course after surgery. Surgery is typically not a cure for a person s CASSIE.  However, surgery will often significantly improve one s CASSIE severity (termed  success rate ).  Even in the absence of a cure, surgery will decrease the cardiovascular risk  associated with OSA7; improve overall quality of life8 (sleepiness, functionality, sleep quality, etc).      Palate Procedures:  Patients with CASSIE often have narrowing of their airway in the region of their tonsils and uvula.  The goals of palate procedures are to widen the airway in this region as well as to help the tissues resist collapse.  Modern palate procedure techniques focus on tissue conservation and soft tissue rearrangement, rather than tissue removal.  Often the uvula is preserved in this procedure. Residual sleep apnea is common in patient after pharyngoplasty with an average reduction in sleep apnea events of 33%2.      Tongue Procedures:  ExamWhile patients are awake, the muscles that surround the throat are active and keep this region open for breathing. These muscles relax during sleep, allowing the tongue and other structures to collapse and block breathing.  There are several different tongue procedures available.  Selection of a tongue base procedure depends on characteristics seen on physical exam.  Generally, procedures are aimed at removing bulky tissues in this area or preventing the back of the tongue from falling back during sleep.  Success rates for tongue surgery range from 50-62%3.    Hypoglossal Nerve Stimulation:  Hypoglossal nerve stimulation has recently received approval from the [place] Food and Drug Administration for the treatment of obstructive sleep apnea.  This is based on research showing that the system was safe and effective in treating sleep apnea6.  Results showed that the median AHI score decreased 68%, from 29.3 to 9.0. This therapy uses an implant system that senses breathing patterns and delivers mild stimulation to airway muscles, which keeps the airway open during sleep.  The system consists of three fully implanted components: a small generator (similar in size to a pacemaker), a breathing sensor, and a stimulation lead.  Using a small handheld remote, a  patient turns the therapy on before bed and off upon awakening.    Candidates for this device must be greater than 22 years of age, have moderate to severe CASSIE (AHI between 20-65), BMI less than 32, have tried CPAP/oral appliance without success, and have appropriate upper airway anatomy (determined by a sleep endoscopy performed by Dr. Pimentel).    Hypoglossal Nerve Stimulation Pathway:    The sleep surgeon s office will work with the patient through the insurance prior-authorization process (including communications and appeals).    Nasal Procedures:  Nasal obstruction can interfere with nasal breathing during the day and night.  Studies have shown that relief of nasal obstruction can improve the ability of some patients to tolerate positive airway pressure therapy for obstructive sleep apnea1.  Treatment options include medications such as nasal saline, topical corticosteroid and antihistamine sprays, and oral medications such as antihistamines or decongestants. Non-surgical treatments can include external nasal dilators for selected patients. If these are not successful by themselves, surgery can improve the nasal airway either alone or in combination with these other options.      Combination Procedures:  Combination of surgical procedures and other treatments may be recommended, particularly if patients have more than one area of narrowing or persistent positional disease.  The success rate of combination surgery ranges from 66-80%2,3.    References  Felipe ADEN. The Role of the Nose in Snoring and Obstructive Sleep Apnoea: An Update.  Eur Arch Otorhinolaryngol. 2011; 268: 1365-73.   Jeffry SM; Kadie JA; Sabra JR; Pallanch JF; Michela MB; Latasha SG; Santiago MICHELLE. Surgical modifications of the upper airway for obstructive sleep apnea in adults: a systematic review and meta-analysis. SLEEP 2010;33(10):5566-8428. Fran ORTEGA. Hypopharyngeal surgery in obstructive sleep apnea: an evidence-based medicine review.  Arch  Otolaryngol Head Neck Surg. 2006 Feb;132(2):206-13.  Randy YH1, Lizzette Y, Juan RADHA. The efficacy of anatomically based multilevel surgery for obstructive sleep apnea. Otolaryngol Head Neck Surg. 2003 Oct;129(4):327-35.  Fran ORTEGA, Goldberg A. Hypopharyngeal Surgery in Obstructive Sleep Apnea: An Evidence-Based Medicine Review. Arch Otolaryngol Head Neck Surg. 2006 Feb;132(2):206-13.  Tyrel HUDSON et al. Upper-Airway Stimulation for Obstructive Sleep Apnea.  N Engl J Med. 2014 Jan 9;370(2):139-49.  Anita Y et al. Increased Incidence of Cardiovascular Disease in Middle-aged Men with Obstructive Sleep Apnea. Am J Respir Crit Care Med; 2002 166: 159-165  Nguyentj RAY et al. Studying Life Effects and Effectiveness of Palatopharyngoplasty (SLEEP) study: Subjective Outcomes of Isolated Uvulopalatopharyngoplasty. Otolaryngol Head Neck Surg. 2011; 144: 623-631.

## 2024-01-22 NOTE — PROGRESS NOTES
[facility]  Consultation Note    Name: Chino Samuel MRN#: [id number]   Age: 43 year old YOB: 1980     Date of Consultation: 01/22/24  Consultation is requested by: Toni Freedman  Primary care provider: Toni Freedman,     History of Present Illness:   Chino Samuel is a 43 year old male patient with Gout   He is sent by Toni Freedman for a sleep consultation regarding Snoring  .    Chino Samuel main reason for visit: Excessive snoring  Patient states problem(s) started: 4 years ago  Chino Samuel's goals for this visit: Determine possible solutions    He has not had a previous sleep study.    CPAP: No    Assessment and Plan:     Problem List Items Addressed This Visit          Respiratory    Snoring    Obstructive sleep apnea - Primary     STOP BANG score is 5/8. Patient likely has sleep apnea based on clinical history of snoring, EDS(ESS 6/24), witnessed apneas, neck circumference, gender, insomnia(KOLTON 14/28), nocturia, morning dry mouth, and morning headaches.   Obstructive sleep apnea reviewed. Complications of Untreated Sleep Apnea Reviewed.  HST/ Polysomnography reviewed. Information provided regarding treatment options for CASSIE.  Recommend HST to evaluate for CASSIE          Relevant Orders    HST - Home Sleep Apnea Test  - WatchPat NonReturnable       SLEEP-WAKE SCHEDULE:   Work/School Days: Patient goes to school/work: Yes   Usually gets into bed at 11pm  Takes patient about 30 minutes to fall asleep  Has trouble falling asleep 3 nights per week  Wakes up in the middle of the night 1 to 2 times times.  Wakes up due to Use the bathroom;Anxiety  He has trouble falling back asleep 2 times a week.   It usually takes 45 minutes to get back to sleep  Patient is usually up at 6am  Uses alarm: Yes  Sleep Inertia: No    Weekends/Non-work Days/All Other Days:  Usually gets into bed at 12am   Takes patient about 1 hour to fall asleep  Patient is usually up at 7am  Uses  alarm: No    Sleep Need  Patient gets  6 hours sleep on average   Patient thinks He needs about 8 hours sleep    Chino Samuel prefers to sleep in this position(s): Back;Side   Patient states they do the following activities in bed: Watch TV    Naps  Patient takes a purposeful nap 1 times a week and naps are usually 20 to 30 minutes in duration  He feels better after a nap: Yes  He dozes off unintentionally 1 days per week  Patient has had a driving accident or near-miss due to sleepiness/drowsiness: No      SLEEP DISRUPTIONS:  Breathing/Snoring  Patient snores:Yes  Other people complain about His snoring: Yes  Patient has been told He stops breathing in His sleep:Yes  He has issues with the following: Morning headaches;Morning mouth dryness;Stuffy nose when you wake up    Movement:  Patient gets pain, discomfort, with an urge to move:  No  It happens when He is resting:  No  It happens more at night:  No  Patient has been told Chino Samuel kicks His legs at night:  No     Behaviors in Sleep:  Chino Samuel has experienced the following behaviors while sleeping:    He has experienced sudden muscle weakness during the day: No       Is there anything else you would like your sleep provider to know:        CAFFEINE AND OTHER SUBSTANCES:  Patient consumes caffeinated beverages per day:  1 to 3 week  Last caffeine use is usually: After work with dinner  List of any prescribed or over the counter stimulants that patient takes: None  List of any prescribed or over the counter sleep medication patient takes: None  List of previous sleep medications that patient has tried: Advil PM,  Patient drinks alcohol to help them sleep: No  Patient drinks alcohol near bedtime: No    Family History:  Patient has a family member been diagnosed with a sleep disorder: No            SCALES:  EPWORTH SLEEPINESS SCALE       1/21/2024     6:31 PM    [place] Sleepiness Scale Aleksandra Dixon  0410-3100<br>ESS - [place]/English - Final version - 21 Nov  07 - [facility] [place].)   Sitting and reading Slight chance of dozing   Watching TV Moderate chance of dozing   Sitting, inactive in a public place (e.g. a theatre or a meeting) Would never doze   As a passenger in a car for an hour without a break Slight chance of dozing   Lying down to rest in the afternoon when circumstances permit Slight chance of dozing   Sitting and talking to someone Would never doze   Sitting quietly after a lunch without alcohol Slight chance of dozing   In a car, while stopped for a few minutes in traffic Would never doze   Donald Score (MC) 6   Donald Score (Sleep) 6       INSOMNIA SEVERITY INDEX (KOLTON)        1/21/2024     6:15 PM   Insomnia Severity Index (KOLTON)   Difficulty falling asleep 2   Difficulty staying asleep 2   Problems waking up too early 2   How SATISFIED/DISSATISFIED are you with your CURRENT sleep pattern? 3   How NOTICEABLE to others do you think your sleep problem is in terms of impairing the quality of your life? 1   How WORRIED/DISTRESSED are you about your current sleep problem? 2   To what extent do you consider your sleep problem to INTERFERE with your daily functioning (e.g. daytime fatigue, mood, ability to function at work/daily chores, concentration, memory, mood, etc.) CURRENTLY? 2   KOLTON Total Score 14    Guidelines for Scoring/Interpretation:  Total score categories:  0-7 = No clinically significant insomnia   8-14 = Subthreshold insomnia   15-21 = Clinical insomnia (moderate severity)  22-28 = Clinical insomnia (severe)  Used via courtesy of www.Mixamoealth.va.gov with permission from Aquilino Cueva PhD., [employer]      STOP BANG   CASSIE High Risk            Total Score: 5    CASSIE: Snores loudly    CASSIE: Often tired    CASSIE: Observed stopped breathing    CASSIE: Neck Circum >16 in    CASSIE: Male          GAD7       No data to display                  CAGE-AID       No data to display              CAGE-AID reprinted with permission from the [place]  "Medical Journal, TERRY Ceja. and JAYLENE Dave, \"Conjoint screening questionnaires for alcohol and drug abuse\" [place] Medical Journal 94: 135-140, 1995.      PATIENT HEALTH QUESTIONNAIRE-9 (PHQ - 9)       No data to display              Developed by Sulma De Guzman, Shivani Blue, Jann Nava and colleagues, with an educational robin from [employer] No permission required to reproduce, translate, display or distribute.      Allergies:    Allergies   Allergen Reactions    Indomethacin Other (See Comments)     PN: HA       Medications:    Current Outpatient Medications   Medication Sig Dispense Refill    colchicine (COLCYRS) 0.6 MG tablet Take 1 tablet (0.6 mg) by mouth 2 times daily (Patient not taking: Reported on 9/15/2023) 60 tablet 3    ibuprofen (ADVIL,MOTRIN) 200 MG tablet Take 200 mg by mouth every 4 hours as needed for mild pain (Patient not taking: Reported on 9/15/2023)         Problem List:  Patient Active Problem List    Diagnosis Date Noted    Obstructive sleep apnea 01/22/2024     Priority: Medium    Snoring 09/15/2023     Priority: Medium    Family hx of prostate cancer 08/29/2022     Priority: Medium    Idiopathic chronic gout of right foot without tophus 02/15/2019     Priority: Medium     usually RT ankle or RT great toe      Onychomycosis 02/15/2019     Priority: Medium    CARDIOVASCULAR SCREENING; LDL GOAL LESS THAN 160 02/15/2019     Priority: Medium    Obesity, Class I, BMI 30-34.9 02/15/2019     Priority: Medium        Past Medical/Surgical History:  Past Medical History:   Diagnosis Date    Idiopathic chronic gout of right foot without tophus      Past Surgical History:   Procedure Laterality Date    ORTHOPEDIC SURGERY  1999    Left shoulder surgery    SHOULDER SURGERY Left 1999    WISDOM TOOTH EXTRACTION         Social History:  Social History     Socioeconomic History    Marital status:      Spouse name: Lily    Number of children: 0    Years of education: 14    " Highest education level: Associate degree: academic program   Occupational History    Occupation:      Employer: [employer]     Comment: [employer]   Tobacco Use    Smoking status: Former     Packs/day: 0.50     Years: 3.00     Additional pack years: 0.00     Total pack years: 1.50     Types: Cigarettes     Start date: 12/3/1996     Quit date: 7/10/2000     Years since quittin.5    Smokeless tobacco: Never    Tobacco comments:     Brief cigarette usage   Vaping Use    Vaping Use: Never used   Substance and Sexual Activity    Alcohol use: Not Currently     Comment: Socially    Drug use: No    Sexual activity: Yes     Partners: Female     Birth control/protection: Female Surgical     Comment: Spouse = Tubal Ligation   Other Topics Concern    Parent/sibling w/ CABG, MI or angioplasty before 65F 55M? No   Social History Narrative    Not on file     Social Determinants of Health     Financial Resource Strain: Not on file   Food Insecurity: Not on file   Transportation Needs: Not on file   Physical Activity: Not on file   Stress: Not on file   Social Connections: Not on file   Interpersonal Safety: Not on file   Housing Stability: Not on file       Family History:  Family History   Problem Relation Age of Onset    Diabetes Father     Hypertension Father     Cerebrovascular Disease Father     No Known Problems Mother     Cerebrovascular Disease Maternal Grandmother     No Known Problems Maternal Grandfather     No Known Problems Paternal Grandmother     No Known Problems Paternal Grandfather     No Known Problems Brother     No Known Problems Brother        Review of Systems:   A complete review of systems reviewed by me is negative with the exeption of what has been mentioned in the history of present illness.  In the last TWO WEEKS have you experienced any of the following symptoms?  Fevers: No  Night Sweats: No  Weight Gain: No  Pain at Night: No  Double Vision: No  Changes in Vision: No  Difficulty  "Breathing through Nose: No  Sore Throat in Morning: No  Dry Mouth in the Morning: Yes  Shortness of Breath Lying Flat: No  Shortness of Breath With Activity: No  Awakening with Shortness of Breath: No  Increased Cough: No  Heart Racing at Night: No  Swelling in Feet or Legs: No  Diarrhea at Night: No  Heartburn at Night: No  Urinating More than Once at Night: No  Losing Control of Urine at Night: No  Joint Pains at Night: No  Headaches in Morning: Yes  Weakness in Arms or Legs: No  Depressed Mood: Yes  Anxiety: Yes    Physical Exam:   Vitals: BP (!) 143/91   Pulse 76   Ht 1.759 m (5' 9.25\")   Wt 99.4 kg (219 lb 3.2 oz)   SpO2 96%   BMI 32.14 kg/m    BMI= Body mass index is 32.14 kg/m .  Neck Cir (cm): 45 cm  GENERAL: alert and no distress  EYES: Eyes grossly normal to inspection.  No discharge or erythema, or obvious scleral/conjunctival abnormalities.  RESP: No audible wheeze, cough, or visible cyanosis.    SKIN: Visible skin clear. No significant rash, abnormal pigmentation or lesions.  NEURO: Cranial nerves grossly intact.  Mentation and speech appropriate for age.  PSYCH: Appropriate affect, tone, and pace of words         Data: All pertinent previous laboratory data reviewed     Recent Labs   Lab Test 09/15/23  0920 08/29/22  0746    138   POTASSIUM 4.3 4.1   CHLORIDE 104 101   CO2 27 25   ANIONGAP 12 12   GLC 94 96   BUN 9.9 15.4   CR 1.19* 1.21*   JACK 9.3 10.1*       Recent Labs   Lab Test 02/15/19  0746   WBC 6.8   RBC 5.20   HGB 15.9   HCT 46.5   MCV 89   MCH 30.6   MCHC 34.2   RDW 12.3          Recent Labs   Lab Test 08/25/21  1039   PROTTOTAL 7.3   ALBUMIN 4.1   BILITOTAL 0.6   ALKPHOS 73   AST 16   ALT 16       TSH (mU/L)   Date Value   02/15/2019 1.17       No results found for: \"UAMP\", \"UBARB\", \"BENZODIAZEUR\", \"UCANN\", \"UCOC\", \"OPIT\", \"UPCP\"    No results found for: \"IRONSAT\", \"DE25913\", \"CAMILO\"    No results found for: \"PH\", \"PHARTERIAL\", \"PO2\", \"XV2FSVLYVGQ\", \"SAT\", \"PCO2\", \"HCO3\", " "\"BASEEXCESS\", \"JOSE A\", \"BEB\"    @LABRCNTIPR(phv:4,pco2v:4,po2v:4,hco3v:4,doc:4,o2per:4)@    Echocardiology: No results found for this or any previous visit (from the past 4320 hour(s)).    Chest x-ray: No results found for this or any previous visit from the past 365 days.      Chest CT: No results found for this or any previous visit from the past 365 days.      PFT: Most Recent B[place]ze Pulmonary Function Testing  No results found     Patient was strongly advised to avoid driving, operating any heavy machinery or other hazardous situations while drowsy or sleepy.  Patient was counseled on the importance of driving while alert, to pull over if drowsy, or nap before getting into the vehicle if sleepy.      Plan is for Chino Samuel to follow up following HST.     The above note was dictated using voice recognition software. Although reviewed after completion, some word and grammatical error may remain . Please contact the author for any clarifications.    Total time spent reviewing medical records, history and physical examination, review of previous testing and interpretation as well as documentation on this date, 01/22/24: 38 minutes    John Bashir MD on 10/20/2022   [facility]   Floor 1, [address]   [address] [place] [address]   Appointments: [phone] [facility]   Floor 1, [address]   [address] [place] [address]   Appointments: [phone]     CC: Toni Freedman    "

## 2024-01-22 NOTE — NURSING NOTE
"Chief Complaint   Patient presents with    Snoring       Initial BP (!) 143/91   Pulse 76   Ht 1.759 m (5' 9.25\")   Wt 99.4 kg (219 lb 3.2 oz)   SpO2 96%   BMI 32.14 kg/m   Estimated body mass index is 32.14 kg/m  as calculated from the following:    Height as of this encounter: 1.759 m (5' 9.25\").    Weight as of this encounter: 99.4 kg (219 lb 3.2 oz).    Medication Reconciliation: complete    Neck circumference:  inches / 45 centimeters.    DME: n/a    WatchPAT appt scheduled and instructions sent to [employer]. Results via [employer].     Orquidea Long MA  "

## 2024-02-22 DIAGNOSIS — M1A.0710 CHRONIC IDIOPATHIC GOUT INVOLVING TOE OF RIGHT FOOT WITHOUT TOPHUS: ICD-10-CM

## 2024-02-23 ENCOUNTER — VIRTUAL VISIT (OUTPATIENT)
Dept: SLEEP MEDICINE | Facility: CLINIC | Age: 44
End: 2024-02-23
Payer: COMMERCIAL

## 2024-02-23 DIAGNOSIS — G47.33 OBSTRUCTIVE SLEEP APNEA: ICD-10-CM

## 2024-02-23 PROCEDURE — 95800 SLP STDY UNATTENDED: CPT

## 2024-02-23 RX ORDER — COLCHICINE 0.6 MG/1
TABLET ORAL
Qty: 60 TABLET | Refills: 3 | Status: SHIPPED | OUTPATIENT
Start: 2024-02-23

## 2024-03-04 ENCOUNTER — MYC MEDICAL ADVICE (OUTPATIENT)
Dept: SLEEP MEDICINE | Facility: CLINIC | Age: 44
End: 2024-03-04
Payer: COMMERCIAL

## 2024-03-04 DIAGNOSIS — G47.33 OBSTRUCTIVE SLEEP APNEA: Primary | ICD-10-CM

## 2024-03-04 NOTE — PROCEDURES
"WatchPAT - HOME SLEEP STUDY INTERPRETATION    Patient: Chino Samuel  MRN: [id number]  YOB: 1980  Study Date: 2/23/2024  Referring Provider: Toni Freedman  Ordering Provider: John Bashir MD      Chain of custody patient verification was not enabled.  Chain of custody verification was not present throughout the entire study.     Indications for Home Study: Chino Samuel is a 43 year old male with a history of gout who presents with symptoms suggestive of obstructive sleep apnea.    Estimated body mass index is 32.14 kg/m  as calculated from the following:    Height as of 1/22/24: 1.759 m (5' 9.25\").    Weight as of 1/22/24: 99.4 kg (219 lb 3.2 oz).  Total score - [place]: 6 (1/21/2024  6:31 PM)  Total Score: 5 (1/21/2024  6:32 PM)    Data: A full night home sleep study was performed recording the standard physiologic parameters including peripheral arterial tonometry (PAT), sound/snoring, body position,  movement, sound, and oxygen saturation by pulse oximetry. Pulse rate was estimated by oximetry recording. Sleep staging (wake, REM, light, and deep sleep) was derived from PAT signal.  This study was considered adequate based on > 4 hours of quality oximetry and respiratory recording. As specified by the AASM Manual for the Scoring of Sleep and Associated events, version 2.3, Rule VIII.D 1B, 4% oxygen desaturation scoring for hypopneas is used as a standard of care on all home sleep apnea testing.    Total Recording Time: 9 hrs, 29 min  Total Sleep Time: 8 hrs, 33 min  % of Sleep Time REM: 10.7%    Respiratory:  Snoring: Snoring was present.  Respiratory events: The PAT respiratory disturbance index [pRDI] was 42.4 events per hour.  The PAT apnea/hypopnea index [pAHI] was 36.9 events per hour.  ADAM was 29.3 events per hour.  During REM sleep the pAHI was 30.7.  Sleep Associated Hypoxemia: sustained hypoxemia was not present. Mean oxygen saturation was 92%.  Minimum was 79%.  Time with " saturation less than 88% was 17.2 minutes.    Heart Rate: By pulse oximetry normal rate was noted.     Position: Percent of time spent: supine - 78.8%, prone - 0.1%, on right - 0.4%, on left - 15.2%.  pAHI was 43.8 per hour supine, 0 per hour prone, 0 per hour on right side, and 13.2 per hour on left side.     Assessment:   Severe obstructive sleep apnea.  Sleep associated hypoxemia was present.    Recommendations:  Consider auto-CPAP at 05-15 cmH2O.  Suggest optimizing sleep hygiene and avoiding sleep deprivation.  Weight management.    Diagnosis Code(s): Obstructive Sleep Apnea G47.33, Hypoxemia G47.36    John Bashir MD, March 4, 2024   Diplomate, American Board of Internal Medicine, Sleep Medicine

## 2024-03-04 NOTE — PROGRESS NOTES
Watch Pat has been scored using rule 1B, 4%.  Patient to follow up with provider to determine appropriate therapy.    PAT AHI: 36.9    Ordering Provider: John Bashir MD

## 2024-03-18 ENCOUNTER — DOCUMENTATION ONLY (OUTPATIENT)
Dept: SLEEP MEDICINE | Facility: CLINIC | Age: 44
End: 2024-03-18
Payer: COMMERCIAL

## 2024-03-18 DIAGNOSIS — G47.33 OBSTRUCTIVE SLEEP APNEA: Primary | ICD-10-CM

## 2024-03-18 NOTE — PROGRESS NOTES
Patient was offered choice of vendor and chose [facility].  Patient Chino Samuel was set up at [facility] on March 18, 2024. Patient received a Resmed Airsense 10 Pressures were set at  5-15 cm H2O.   Patient s ramp is 5 cm H2O for Auto and FLEX/EPR is EPR, 2.  Patient received a Resmed Mask name: Airfit [id number]  Nasal mask size Medium, heated tubing and heated humidifier.  Patient has the following compliance requirements: using and visit requirements  Patient has a follow up on tbd.   Sam O Humes

## 2024-03-21 ENCOUNTER — DOCUMENTATION ONLY (OUTPATIENT)
Dept: SLEEP MEDICINE | Facility: CLINIC | Age: 44
End: 2024-03-21
Payer: COMMERCIAL

## 2024-03-21 NOTE — PROGRESS NOTES
3 day Sleep therapy management telephone visit    Diagnostic AHI: 36.9  watch PAT    Confirmed with patient at time of call- N/A Patient is still interested in STM service       Message left for patient to return call    Order settings:  CPAP MIN CPAP MAX   5 cm H2O 15 cm H2O       Device settings:  CPAP MIN CPAP MAX EPR RESMED SOFT RESPONSE SETTING   5.0 cm  H20 15.0 cm  H20 TWO OFF       Compliance 0 %    Assessment: Minimal usage     Patient has the following upcoming sleep appts:      Replacement device: No  STM ordered by provider: Yes     Total time spent on accessing and  interpreting remote patient PAP therapy data  10 minutes    Total time spent counseling, coaching  and reviewing PAP therapy data with patient  1 minutes    [id number] no

## 2024-04-02 ENCOUNTER — DOCUMENTATION ONLY (OUTPATIENT)
Dept: SLEEP MEDICINE | Facility: CLINIC | Age: 44
End: 2024-04-02
Payer: COMMERCIAL

## 2024-04-02 NOTE — PROGRESS NOTES
14  DAY STM VISIT    Diagnostic AHI:   WatchPat:36.9 events per hour       Message left for patient to return call     Assessment: Pt not meeting objective benchmarks for Compliance       Action plan: waiting for patient to return call.  and pt to have 30 day STM visit.      Device type: Auto-CPAP    PAP settings:  CPAP MIN CPAP MAX 95TH % PRESSURE EPR RESMED SOFT RESPONSE SETTING   5.0 cm  H20 15.0 cm  H20 8.5 cm  H20  TWO OFF     Mask type:  Per patient choice    Objective measures: 14 day rolling measures   COMPLIANCE LEAK AHI AVERAGE USE IN MINUTES   57 % 6.36 0.82 176   GOAL >70% GOAL < 24 LPM GOAL <5 GOAL >240      Patient has the following upcoming sleep appts:      Total time spent on accessing and interpreting remote patient PAP therapy data  10 minutes    Total time spent counseling, coaching  and reviewing PAP therapy data with patient  1 minute    [id number]  [id number]  no (3 day STM)

## 2024-04-18 ENCOUNTER — DOCUMENTATION ONLY (OUTPATIENT)
Dept: SLEEP MEDICINE | Facility: CLINIC | Age: 44
End: 2024-04-18
Payer: COMMERCIAL

## 2024-04-18 NOTE — PROGRESS NOTES
30 DAY [facility] VISIT    Diagnostic AHI:   WatchPat:36.9 events per hour     PAP settings:  CPAP MIN CPAP MAX 95TH % PRESSURE EPR RESMED SOFT RESPONSE SETTING   5.0 cm  H20 15.0 cm  H20 9.7 cm  H20  TWO OFF     Device type: Auto-CPAP  Mask type:  Per patient choice    Objective measures: 14 day rolling measures:    COMPLIANCE LEAK AHI AVERAGE USE IN MINUTES   78 % 13.1 1.8 272   GOAL >70% GOAL < 24 LPM GOAL <5 GOAL >240        Assessment: Pt meeting objective benchmarks.     Message left for patient to return call   Action plan: waiting for patient to return call.  Patient has the following upcoming sleep appts:    Total time spent on accessing and interpreting remote patient PAP therapy data  10 minutes    Total time spent counseling, coaching  and reviewing PAP therapy data with patient  1 minutes     [id number] this call  [id number] no  at 3 or 14 day [facility]

## 2024-06-03 ENCOUNTER — OFFICE VISIT (OUTPATIENT)
Dept: FAMILY MEDICINE | Facility: CLINIC | Age: 44
End: 2024-06-03
Payer: COMMERCIAL

## 2024-06-03 VITALS
RESPIRATION RATE: 12 BRPM | HEIGHT: 69 IN | SYSTOLIC BLOOD PRESSURE: 127 MMHG | TEMPERATURE: 98.2 F | HEART RATE: 70 BPM | DIASTOLIC BLOOD PRESSURE: 82 MMHG | WEIGHT: 208.7 LBS | BODY MASS INDEX: 30.91 KG/M2 | OXYGEN SATURATION: 96 %

## 2024-06-03 DIAGNOSIS — Z12.11 SCREEN FOR COLON CANCER: ICD-10-CM

## 2024-06-03 DIAGNOSIS — G47.33 OBSTRUCTIVE SLEEP APNEA: Primary | ICD-10-CM

## 2024-06-03 PROCEDURE — 99213 OFFICE O/P EST LOW 20 MIN: CPT | Performed by: FAMILY MEDICINE

## 2024-06-03 PROCEDURE — G2211 COMPLEX E/M VISIT ADD ON: HCPCS | Performed by: FAMILY MEDICINE

## 2024-06-03 NOTE — PROGRESS NOTES
Assessment & Plan   Problem List Items Addressed This Visit       Obstructive sleep apnea - Primary     Feeling refreshed on wakening. Afternoon fatigue improved significantly as well. Also more energy resulting in better exercise and reduction in weight. AHI down to 1.8, with use over 70% and greater than 240 min. Continue current plan and settings.          Other Visit Diagnoses       Screen for colon cancer        Relevant Orders    Colonoscopy Screening  Referral              See Patient Instructions      Suzan Magana is a 43 year old, presenting for the following health issues:  Follow Up (Sleep Apnea)        6/3/2024     9:25 AM   Additional Questions   Roomed by MARKIE Brandon   Accompanied by Self         6/3/2024     9:25 AM   Patient Reported Additional Medications   Patient reports taking the following new medications N/A     Follow-up on injection of CPAP therapy.  Currently set between 5-15.  Most recent downloads were evaluated and patient's symptoms have been improving.  He is tolerating it well.  Still working a little on the mask fit as once or twice a night he will roll over and the seal be lost but it immediately warns him he is able to readjust and fall back asleep immediately.  Sleep is feeling refreshing.  He is not having any morning headaches.  Also he has noticed better energy in the afternoon and with that he is exercising more and has achieved a 11 pound weight loss since starting CPAP.    History of Present Illness       Reason for visit:  Physician follow up required within 90 days of cpap treatment    He eats 2-3 servings of fruits and vegetables daily.He consumes 1 sweetened beverage(s) daily.He exercises with enough effort to increase his heart rate 10 to 19 minutes per day.  He exercises with enough effort to increase his heart rate 5 days per week.   He is taking medications regularly.           Objective    /82 (BP Location: Left arm, Patient Position: Sitting, Cuff  "Size: Adult Large)   Pulse 70   Temp 98.2  F (36.8  C) (Oral)   Resp 12   Ht 1.759 m (5' 9.25\")   Wt 94.7 kg (208 lb 11.2 oz)   SpO2 96%   BMI 30.60 kg/m    Body mass index is 30.6 kg/m .  Physical Exam  Vitals and nursing note reviewed.   Constitutional:       General: He is not in acute distress.     Appearance: Normal appearance. He is not ill-appearing.   HENT:      Head: Normocephalic and atraumatic.   Eyes:      Extraocular Movements: Extraocular movements intact.      Conjunctiva/sclera: Conjunctivae normal.   Pulmonary:      Effort: Pulmonary effort is normal.   Neurological:      Mental Status: He is alert and oriented to person, place, and time.   Psychiatric:         Attention and Perception: Attention normal.         Mood and Affect: Mood normal.         Speech: Speech normal.         Thought Content: Thought content normal.      The longitudinal plan of care for the diagnosis(es)/condition(s) as documented were addressed during this visit. Due to the added complexity in care, I will continue to support Chino in the subsequent management and with ongoing continuity of care.      Signed Electronically by: Toni Freedman,     "

## 2024-06-03 NOTE — ASSESSMENT & PLAN NOTE
Feeling refreshed on wakening. Afternoon fatigue improved significantly as well. Also more energy resulting in better exercise and reduction in weight. AHI down to 1.8, with use over 70% and greater than 240 min. Continue current plan and settings.

## 2024-08-30 ENCOUNTER — TELEPHONE (OUTPATIENT)
Dept: FAMILY MEDICINE | Facility: CLINIC | Age: 44
End: 2024-08-30
Payer: COMMERCIAL

## 2024-08-30 NOTE — TELEPHONE ENCOUNTER
Patient Quality Outreach    Patient is due for the following:   Colon Cancer Screening    Next Steps:   Patient has upcoming appointment, these items will be addressed at that time.  Pt has appt w/PCP on 9/3/24.    Type of outreach:    Chart review performed, no outreach needed.      Questions for provider review:    None           Aquilino Brandon Jr., MA

## 2024-09-30 SDOH — HEALTH STABILITY: PHYSICAL HEALTH: ON AVERAGE, HOW MANY DAYS PER WEEK DO YOU ENGAGE IN MODERATE TO STRENUOUS EXERCISE (LIKE A BRISK WALK)?: 5 DAYS

## 2024-09-30 SDOH — HEALTH STABILITY: PHYSICAL HEALTH: ON AVERAGE, HOW MANY MINUTES DO YOU ENGAGE IN EXERCISE AT THIS LEVEL?: 20 MIN

## 2024-09-30 ASSESSMENT — SOCIAL DETERMINANTS OF HEALTH (SDOH): HOW OFTEN DO YOU GET TOGETHER WITH FRIENDS OR RELATIVES?: ONCE A WEEK

## 2024-10-01 ENCOUNTER — OFFICE VISIT (OUTPATIENT)
Dept: FAMILY MEDICINE | Facility: CLINIC | Age: 44
End: 2024-10-01
Attending: FAMILY MEDICINE
Payer: COMMERCIAL

## 2024-10-01 VITALS
HEIGHT: 70 IN | BODY MASS INDEX: 29.96 KG/M2 | RESPIRATION RATE: 12 BRPM | HEART RATE: 72 BPM | DIASTOLIC BLOOD PRESSURE: 86 MMHG | OXYGEN SATURATION: 96 % | SYSTOLIC BLOOD PRESSURE: 128 MMHG | TEMPERATURE: 98.2 F | WEIGHT: 209.3 LBS

## 2024-10-01 DIAGNOSIS — M1A.0710 IDIOPATHIC CHRONIC GOUT OF RIGHT FOOT WITHOUT TOPHUS: ICD-10-CM

## 2024-10-01 DIAGNOSIS — Z80.42 FAMILY HX OF PROSTATE CANCER: ICD-10-CM

## 2024-10-01 DIAGNOSIS — Z12.5 PROSTATE CANCER SCREENING: ICD-10-CM

## 2024-10-01 DIAGNOSIS — Z13.1 DIABETES MELLITUS SCREENING: ICD-10-CM

## 2024-10-01 DIAGNOSIS — Z00.00 ROUTINE GENERAL MEDICAL EXAMINATION AT A HEALTH CARE FACILITY: Primary | ICD-10-CM

## 2024-10-01 DIAGNOSIS — Z13.220 LIPID SCREENING: ICD-10-CM

## 2024-10-01 LAB
ANION GAP SERPL CALCULATED.3IONS-SCNC: 11 MMOL/L (ref 7–15)
BUN SERPL-MCNC: 10.6 MG/DL (ref 6–20)
CALCIUM SERPL-MCNC: 9.1 MG/DL (ref 8.8–10.4)
CHLORIDE SERPL-SCNC: 102 MMOL/L (ref 98–107)
CHOLEST SERPL-MCNC: 203 MG/DL
CREAT SERPL-MCNC: 1.13 MG/DL (ref 0.67–1.17)
EGFRCR SERPLBLD CKD-EPI 2021: 82 ML/MIN/1.73M2
FASTING STATUS PATIENT QL REPORTED: YES
FASTING STATUS PATIENT QL REPORTED: YES
GLUCOSE SERPL-MCNC: 97 MG/DL (ref 70–99)
HCO3 SERPL-SCNC: 24 MMOL/L (ref 22–29)
HDLC SERPL-MCNC: 34 MG/DL
LDLC SERPL CALC-MCNC: 130 MG/DL
NONHDLC SERPL-MCNC: 169 MG/DL
POTASSIUM SERPL-SCNC: 4.2 MMOL/L (ref 3.4–5.3)
PSA SERPL DL<=0.01 NG/ML-MCNC: 0.67 NG/ML (ref 0–2.5)
SODIUM SERPL-SCNC: 137 MMOL/L (ref 135–145)
TRIGL SERPL-MCNC: 193 MG/DL
URATE SERPL-MCNC: 8.4 MG/DL (ref 3.4–7)

## 2024-10-01 PROCEDURE — 80048 BASIC METABOLIC PNL TOTAL CA: CPT | Performed by: FAMILY MEDICINE

## 2024-10-01 PROCEDURE — 36415 COLL VENOUS BLD VENIPUNCTURE: CPT | Performed by: FAMILY MEDICINE

## 2024-10-01 PROCEDURE — 84550 ASSAY OF BLOOD/URIC ACID: CPT | Performed by: FAMILY MEDICINE

## 2024-10-01 PROCEDURE — 99396 PREV VISIT EST AGE 40-64: CPT | Performed by: FAMILY MEDICINE

## 2024-10-01 PROCEDURE — 80061 LIPID PANEL: CPT | Performed by: FAMILY MEDICINE

## 2024-10-01 PROCEDURE — G0103 PSA SCREENING: HCPCS | Performed by: FAMILY MEDICINE

## 2024-10-01 NOTE — PATIENT INSTRUCTIONS
Patient Education   Preventive Care Advice   This is general advice given by our system to help you stay healthy. However, your care team may have specific advice just for you. Please talk to your care team about your preventive care needs.  Nutrition  Eat 5 or more servings of fruits and vegetables each day.  Try wheat bread, brown rice and whole grain pasta (instead of white bread, rice, and pasta).  Get enough calcium and vitamin D. Check the label on foods and aim for 100% of the RDA (recommended daily allowance).  Lifestyle  Exercise at least 150 minutes each week  (30 minutes a day, 5 days a week).  Do muscle strengthening activities 2 days a week. These help control your weight and prevent disease.  No smoking.  Wear sunscreen to prevent skin cancer.  Have a dental exam and cleaning every 6 months.  Yearly exams  See your health care team every year to talk about:  Any changes in your health.  Any medicines your care team has prescribed.  Preventive care, family planning, and ways to prevent chronic diseases.  Shots (vaccines)   HPV shots (up to age 26), if you've never had them before.  Hepatitis B shots (up to age 59), if you've never had them before.  COVID-19 shot: Get this shot when it's due.  Flu shot: Get a flu shot every year.  Tetanus shot: Get a tetanus shot every 10 years.  Pneumococcal, hepatitis A, and RSV shots: Ask your care team if you need these based on your risk.  Shingles shot (for age 50 and up)  General health tests  Diabetes screening:  Starting at age 35, Get screened for diabetes at least every 3 years.  If you are younger than age 35, ask your care team if you should be screened for diabetes.  Cholesterol test: At age 39, start having a cholesterol test every 5 years, or more often if advised.  Bone density scan (DEXA): At age 50, ask your care team if you should have this scan for osteoporosis (brittle bones).  Hepatitis C: Get tested at least once in your life.  STIs (sexually  transmitted infections)  Before age 24: Ask your care team if you should be screened for STIs.  After age 24: Get screened for STIs if you're at risk. You are at risk for STIs (including HIV) if:  You are sexually active with more than one person.  You don't use condoms every time.  You or a partner was diagnosed with a sexually transmitted infection.  If you are at risk for HIV, ask about PrEP medicine to prevent HIV.  Get tested for HIV at least once in your life, whether you are at risk for HIV or not.  Cancer screening tests  Cervical cancer screening: If you have a cervix, begin getting regular cervical cancer screening tests starting at age 21.  Breast cancer scan (mammogram): If you've ever had breasts, begin having regular mammograms starting at age 40. This is a scan to check for breast cancer.  Colon cancer screening: It is important to start screening for colon cancer at age 45.  Have a colonoscopy test every 10 years (or more often if you're at risk) Or, ask your provider about stool tests like a FIT test every year or Cologuard test every 3 years.  To learn more about your testing options, visit:   .  For help making a decision, visit:   https://bit.ly/hm19303.  Prostate cancer screening test: If you have a prostate, ask your care team if a prostate cancer screening test (PSA) at age 55 is right for you.  Lung cancer screening: If you are a current or former smoker ages 50 to 80, ask your care team if ongoing lung cancer screenings are right for you.  For informational purposes only. Not to replace the advice of your health care provider. Copyright   2023 [employer]. All rights reserved. Clinically reviewed by the [facility]. Badge [id number] - REV 01/24.

## 2024-10-01 NOTE — PROGRESS NOTES
"Preventive Care Visit  [facility]  Oct 1, 2024      Assessment & Plan   Problem List Items Addressed This Visit       Idiopathic chronic gout of right foot without tophus    Relevant Orders    Uric acid    Basic metabolic panel    Family hx of prostate cancer    Relevant Orders    Prostate Specific Antigen Screen     Other Visit Diagnoses       Routine general medical examination at a health care facility    -  Primary    Lipid screening        Relevant Orders    Lipid panel reflex to direct LDL Fasting    Prostate cancer screening        Relevant Orders    Prostate Specific Antigen Screen    Diabetes mellitus screening        Relevant Orders    Basic metabolic panel               BMI  Estimated body mass index is 30.25 kg/m  as calculated from the following:    Height as of this encounter: 1.772 m (5' 9.75\").    Weight as of this encounter: 94.9 kg (209 lb 4.8 oz).       Counseling  Appropriate preventive services were addressed with this patient via screening, questionnaire, or discussion as appropriate for fall prevention, nutrition, physical activity, Tobacco-use cessation, social engagement, weight loss and cognition.  Checklist reviewing preventive services available has been given to the patient.  Reviewed patient's diet, addressing concerns and/or questions.   He is at risk for psychosocial distress and has been provided with information to reduce risk.         Suzan Magana is a 43 year old, presenting for the following:  Physical        10/1/2024     7:13 AM   Additional Questions   Roomed by MARKIE Brandon   Accompanied by Self         10/1/2024     7:13 AM   Patient Reported Additional Medications   Patient reports taking the following new medications N/A        Health Care Directive  Patient does not have a Health Care Directive or Living Will: Discussed advance care planning with patient; however, patient declined at this " time.    Patient presents to the clinic for an annual physical exam. Patient appears within normal limits and affect is verenice. He currently returned from a trip from [place], [place] visiting a friend. Patients states that his health has been overall well other than having a gout flare-up 5 months ago after having a few stakes and drinking soda. Otherwise, patient has been exercising regularly and lost weight since last year. He is currently staying active and is planning on running a half marathon with his wife next spring. Denies any chest pain, shortness of breath, dyspnea exertion, palpitations, nausea or vomiting.  Denies any changes in vision or hearing, or urinary or bowel habits.                9/30/2024   General Health   How would you rate your overall physical health? Good   Feel stress (tense, anxious, or unable to sleep) Only a little      (!) STRESS CONCERN      9/30/2024   Nutrition   Three or more servings of calcium each day? Yes   Diet: Regular (no restrictions)   How many servings of fruit and vegetables per day? (!) 2-3   How many sweetened beverages each day? 0-1            9/30/2024   Exercise   Days per week of moderate/strenous exercise 5 days   Average minutes spent exercising at this level 20 min            9/30/2024   Social Factors   Frequency of gathering with friends or relatives Once a week   Worry food won't last until get money to buy more No   Food not last or not have enough money for food? No   Do you have housing? (Housing is defined as stable permanent housing and does not include staying ouside in a car, in a tent, in an abandoned building, in an overnight shelter, or couch-surfing.) No   Are you worried about losing your housing? No   Lack of transportation? No   Unable to get utilities (heat,electricity)? No   Want help with housing or utility concern? No      (!) HOUSING CONCERN PRESENT      9/30/2024   Dental   Dentist two times every year? Yes            9/30/2024   TB  Screening   Were you born outside of the [place]? No           2024   Substance Use   Alcohol more than 3/day or more than 7/wk No   Do you use any other substances recreationally? No        Social History     Tobacco Use    Smoking status: Former     Current packs/day: 0.00     Average packs/day: 0.5 packs/day for 3.6 years (1.8 ttl pk-yrs)     Types: Cigarettes     Start date: 12/3/1996     Quit date: 7/10/2000     Years since quittin.2     Passive exposure: Past    Smokeless tobacco: Never    Tobacco comments:     Brief cigarette usage   Vaping Use    Vaping status: Never Used   Substance Use Topics    Alcohol use: Yes     Comment: Socially    Drug use: No           2024   STI Screening   New sexual partner(s) since last STI/HIV test? No      ASCVD Risk   The 10-year ASCVD risk score (Sharri CURRAN, et al., 2019) is: 4%    Values used to calculate the score:      Age: 43 years      Sex: Male      Is Non- : No      Diabetic: No      Tobacco smoker: No      Systolic Blood Pressure: 128 mmHg      Is BP treated: No      HDL Cholesterol: 32 mg/dL      Total Cholesterol: 215 mg/dL        2024   Contraception/Family Planning   Questions about contraception or family planning No           Reviewed and updated as needed this visit by Provider                        Review of Systems  CONSTITUTIONAL: NEGATIVE for fever, chills, change in weight  ENT/MOUTH: NEGATIVE for ear, mouth and throat problems  RESP: NEGATIVE for significant cough or SOB  CV: NEGATIVE for chest pain, palpitations or peripheral edema  : negative for dysuria, hematuria, decreased urinary stream, erectile dysfunction  MUSCULOSKELETAL: NEGATIVE for significant arthralgias or myalgia  NEURO: NEGATIVE for weakness, dizziness or paresthesias  PSYCHIATRIC: NEGATIVE for changes in mood or affect     Objective    Exam  /86 (BP Location: Left arm, Patient Position: Sitting, Cuff Size: Adult Large)   Pulse 72   " Temp 98.2  F (36.8  C) (Oral)   Resp 12   Ht 1.772 m (5' 9.75\")   Wt 94.9 kg (209 lb 4.8 oz)   SpO2 96%   BMI 30.25 kg/m     Estimated body mass index is 30.25 kg/m  as calculated from the following:    Height as of this encounter: 1.772 m (5' 9.75\").    Weight as of this encounter: 94.9 kg (209 lb 4.8 oz).    Physical Exam  Vitals and nursing note reviewed.   Constitutional:       Appearance: Normal appearance.   HENT:      Head: Normocephalic and atraumatic.      Right Ear: Tympanic membrane, ear canal and external ear normal.      Left Ear: Tympanic membrane, ear canal and external ear normal.      Nose: Nose normal.      Mouth/Throat:      Mouth: Mucous membranes are moist.      Pharynx: Oropharynx is clear.   Eyes:      Extraocular Movements: Extraocular movements intact.      Conjunctiva/sclera: Conjunctivae normal.   Neck:      Vascular: No carotid bruit.   Cardiovascular:      Rate and Rhythm: Normal rate.      Pulses: Normal pulses.      Heart sounds: Normal heart sounds.   Pulmonary:      Effort: Pulmonary effort is normal.      Breath sounds: Normal breath sounds.   Musculoskeletal:         General: Normal range of motion.      Cervical back: Normal range of motion.      Right lower leg: No edema.      Left lower leg: No edema.   Skin:     General: Skin is warm and dry.      Capillary Refill: Capillary refill takes less than 2 seconds.   Neurological:      General: No focal deficit present.      Mental Status: He is alert and oriented to person, place, and time.      Gait: Gait is intact.      Deep Tendon Reflexes:      Reflex Scores:       Bicep reflexes are 2+ on the right side.       Patellar reflexes are 2+ on the right side and 2+ on the left side.  Psychiatric:         Mood and Affect: Mood normal.         Thought Content: Thought content normal.       Beto Montesinos  FNP Student PGY3    ----- Services Performed by a STUDENT in Presence of ATTENDING Physician-------        Signed " Electronically by: Toni Freedman, DO

## 2024-10-04 ENCOUNTER — MYC MEDICAL ADVICE (OUTPATIENT)
Dept: FAMILY MEDICINE | Facility: CLINIC | Age: 44
End: 2024-10-04
Payer: COMMERCIAL

## 2024-10-04 NOTE — TELEPHONE ENCOUNTER
The 10-year ASCVD risk score (Sharri CURRAN, et al., 2019) is: 3.4%    Values used to calculate the score:      Age: 43 years      Sex: Male      Is Non- : No      Diabetic: No      Tobacco smoker: No      Systolic Blood Pressure: 128 mmHg      Is BP treated: No      HDL Cholesterol: 34 mg/dL      Total Cholesterol: 203 mg/dL    no

## 2024-10-18 ENCOUNTER — HOSPITAL ENCOUNTER (OUTPATIENT)
Facility: CLINIC | Age: 44
End: 2024-10-18
Attending: INTERNAL MEDICINE | Admitting: INTERNAL MEDICINE
Payer: COMMERCIAL

## 2024-10-18 ENCOUNTER — TELEPHONE (OUTPATIENT)
Dept: GASTROENTEROLOGY | Facility: CLINIC | Age: 44
End: 2024-10-18
Payer: COMMERCIAL

## 2024-10-18 NOTE — TELEPHONE ENCOUNTER
"Endoscopy Scheduling Screen    Have you had any respiratory illness or flu-like symptoms in the last 10 days?  No    What is your communication preference for Instructions and/or Bowel Prep?   MyChart    What insurance is in the chart?  Other:  [employer]    Ordering/Referring Provider:     KATHLEEN SANDOVAL      (If ordering provider performs procedure, schedule with ordering provider unless otherwise instructed. )    BMI: Estimated body mass index is 30.25 kg/m  as calculated from the following:    Height as of 10/1/24: 1.772 m (5' 9.75\").    Weight as of 10/1/24: 94.9 kg (209 lb 4.8 oz).     Sedation Ordered  moderate sedation.   If patient BMI > 50 do not schedule in ASC.    If patient BMI > 45 do not schedule at [facility].    Are you taking methadone or Suboxone?  NO, No RN review required.    Have you been diagnosed and are being treated for severe PTSD or severe anxiety?  NO, No RN review required.    Are you taking any prescription medications for pain 3 or more times per week?   NO, No RN review required.    Do you have a history of malignant hyperthermia?  No    (Females) Are you currently pregnant?        Have you been diagnosed or told you have pulmonary hypertension?   No    Do you have an LVAD?  No    Have you been told you have moderate to severe sleep apnea?  No.    Have you been told you have COPD, asthma, or any other lung disease?  No    Do you have any heart conditions?  No     Have you ever had or are you waiting for an organ transplant?  No. Continue scheduling, no site restrictions.    Have you had a stroke or transient ischemic attack (TIA aka \"mini stroke\" in the last 6 months?   No    Have you been diagnosed with or been told you have cirrhosis of the liver?   No.    Are you currently on dialysis?   No    Do you need assistance transferring?   No    BMI: Estimated body mass index is 30.25 kg/m  as calculated from the following:    Height as of 10/1/24: 1.772 m (5' 9.75\").    Weight as of 10/1/24: " 94.9 kg (209 lb 4.8 oz).     Is patients BMI > 40 and scheduling location [facility]?  No    Do you take an injectable or oral medication for weight loss or diabetes (excluding insulin)?  No    Do you take the medication Naltrexone?  No    Do you take blood thinners?  No       Prep   Are you currently on dialysis or do you have chronic kidney disease?  No    Do you have a diagnosis of diabetes?  No    Do you have a diagnosis of cystic fibrosis (CF)?  No    On a regular basis do you go 3 -5 days between bowel movements?  No    BMI > 40?  No    Preferred Pharmacy:    [id number] - [place], [place] - [address]  [address]  [facility] [address]  Phone: [phone] Fax: [phone]    Final Scheduling Details     Procedure scheduled  Colonoscopy    Surgeon:  JOHNY     Date of procedure:  12/5     Pre-OP / PAC:   No - Not required for this site.    Location  [facility] - Patient preference.    Sedation   Moderate Sedation - Per order.      Patient Reminders:   You will receive a call from a Nurse to review instructions and health history.  This assessment must be completed prior to your procedure.  Failure to complete the Nurse assessment may result in the procedure being cancelled.      On the day of your procedure, please designate an adult(s) who can drive you home stay with you for the next 24 hours. The medicines used in the exam will make you sleepy. You will not be able to drive.      You cannot take public transportation, ride share services, or non-medical taxi service without a responsible caregiver.  Medical transport services are allowed with the requirement that a responsible caregiver will receive you at your destination.  We require that drivers and caregivers are confirmed prior to your procedure.

## 2024-11-15 NOTE — TELEPHONE ENCOUNTER
Standard Miralax Bowel Prep recommended due to standard bowel prep. Instructions were sent via [employer]Apart.     Vee Oakley LPN  Endoscopy Procedure Pre Assessment

## 2024-11-21 ENCOUNTER — TELEPHONE (OUTPATIENT)
Dept: GASTROENTEROLOGY | Facility: CLINIC | Age: 44
End: 2024-11-21
Payer: COMMERCIAL

## 2024-11-21 NOTE — TELEPHONE ENCOUNTER
Attempted to contact patient in order to complete pre assessment questions.     No answer. Left message to return call to [phone] option 2.    Callback required communication sent via [employer].      Procedure details:    Patient scheduled for Colonoscopy on 12/5/24.     Arrival time: 1245. Procedure time 1330    Facility location: [facility]; [facility] [address] [place] [address]. Check in location: 1st [facility].     Sedation type: Conscious sedation     Pre op exam needed? No.    Indication for procedure: Screening      Chart review:     Electronic implanted devices? No    Recent diagnosis of diverticulitis within the last 6 weeks? No      Medication review:    Diabetic? No    Anticoagulants? No    Weight loss medication/injectable? No GLP-1 medication per patient's medication list. Nursing to verify with pre-assessment call.    Other medication HOLDING recommendations:  N/A      Prep for procedure:     Bowel prep recommendation: Standard Miralax  Due to: standard bowel prep.    Prep instructions resent via [employer]ile.       Deandra Perez RN  Endoscopy Procedure Pre Assessment

## 2024-11-25 ENCOUNTER — TELEPHONE (OUTPATIENT)
Dept: GASTROENTEROLOGY | Facility: CLINIC | Age: 44
End: 2024-11-25
Payer: COMMERCIAL

## 2024-11-25 ENCOUNTER — HOSPITAL ENCOUNTER (OUTPATIENT)
Facility: AMBULATORY SURGERY CENTER | Age: 44
End: 2024-11-25
Attending: SURGERY
Payer: COMMERCIAL

## 2024-11-25 NOTE — TELEPHONE ENCOUNTER
Caller: Chino Samuel   Reason for Reschedule/Cancellation (please be detailed, any staff messages or encounters to note?):     Per pt-- change Loc to [facility]       Prior to reschedule please review:  Ordering Provider:Toni Freedman DO    Sedation Determined: mod   Does patient have any ASC Exclusions, please identify?: y - [facility]       Notes on Cancelled Procedure:  Procedure:Lower Endoscopy [Colonoscopy]   Date: 12/05/2024  Location:[facility]; [facility] [place] [address] [address]  Surgeon: Uriel         Rescheduled: yes   Procedure: Lower Endoscopy [Colonoscopy]  Date: 02/11/2024  Location: [facility]; [address], [address], [place] [address]  Surgeon: Mirian   Sedation Level Scheduled  Mac          Reason for Sedation Level per loc   Prep/Instructions updated and sent: mychart     Does patient need PAC or Pre -Op Rescheduled? : n        Send In - basket message to Panc - Pierre Pool if E[facility] procedure is canceled or rescheduled: [ N/A, YES or NO] n

## 2024-11-25 NOTE — TELEPHONE ENCOUNTER
[facility] ONLY - [facility]     Caller: Chino Samuel   Reason for Reschedule/Cancellation (please be detailed, any staff messages or encounters to note?):     Per pt -- change date       Rescheduled: yes   Procedure: Lower Endoscopy [Colonoscopy]  Date: 01/21/2025  Location: [facility]; [facility] [address] [place] [address]   Surgeon: Alberto   Sedation Level Scheduled  mod          Reason for Sedation Level per order   Prep/Instructions updated and sent: mychart     Does patient need PAC or Pre -Op Rescheduled? : n       Send In - basket message to Panc - Pierre Pool if EUS procedure is canceled or rescheduled: [ N/A, YES or NO] n

## 2024-11-26 NOTE — TELEPHONE ENCOUNTER
Upon review of chart, patient has rescheduled the 12/5/24 colonoscopy procedure.    Deandra Perez RN  Endoscopy Procedure Pre-Assessment RN  [phone], option 2

## 2024-12-05 ENCOUNTER — HOSPITAL ENCOUNTER (OUTPATIENT)
Facility: CLINIC | Age: 44
Setting detail: OBSERVATION
End: 2024-12-05
Attending: STUDENT IN AN ORGANIZED HEALTH CARE EDUCATION/TRAINING PROGRAM | Admitting: STUDENT IN AN ORGANIZED HEALTH CARE EDUCATION/TRAINING PROGRAM
Payer: COMMERCIAL

## 2024-12-05 ENCOUNTER — TELEPHONE (OUTPATIENT)
Dept: NEUROLOGY | Facility: CLINIC | Age: 44
End: 2024-12-05
Payer: COMMERCIAL

## 2024-12-05 VITALS
BODY MASS INDEX: 28.36 KG/M2 | WEIGHT: 202.6 LBS | TEMPERATURE: 98.2 F | OXYGEN SATURATION: 94 % | SYSTOLIC BLOOD PRESSURE: 149 MMHG | HEART RATE: 72 BPM | DIASTOLIC BLOOD PRESSURE: 85 MMHG | HEIGHT: 71 IN

## 2024-12-05 DIAGNOSIS — I63.10 CEREBROVASCULAR ACCIDENT (CVA) DUE TO EMBOLISM OF PRECEREBRAL ARTERY (H): Primary | ICD-10-CM

## 2024-12-05 DIAGNOSIS — I63.9 CEREBROVASCULAR ACCIDENT (CVA), UNSPECIFIED MECHANISM (H): ICD-10-CM

## 2024-12-05 DIAGNOSIS — I51.0 CARDIAC SEPTAL DEFECT, ACQUIRED: ICD-10-CM

## 2024-12-05 LAB
APTT PPP: 25 SECONDS (ref 22–38)
ATRIAL RATE - MUSE: 65 BPM
CHOLEST SERPL-MCNC: 216 MG/DL
DIASTOLIC BLOOD PRESSURE - MUSE: NORMAL MMHG
EST. AVERAGE GLUCOSE BLD GHB EST-MCNC: 100 MG/DL
FASTING STATUS PATIENT QL REPORTED: NO
GLUCOSE BLDC GLUCOMTR-MCNC: 110 MG/DL (ref 70–99)
GLUCOSE BLDC GLUCOMTR-MCNC: 93 MG/DL (ref 70–99)
HBA1C MFR BLD: 5.1 %
HDLC SERPL-MCNC: 32 MG/DL
INR PPP: 1.04 (ref 0.85–1.15)
INTERPRETATION ECG - MUSE: NORMAL
LDLC SERPL CALC-MCNC: 148 MG/DL
NONHDLC SERPL-MCNC: 184 MG/DL
P AXIS - MUSE: 40 DEGREES
PR INTERVAL - MUSE: 168 MS
QRS DURATION - MUSE: 86 MS
QT - MUSE: 388 MS
QTC - MUSE: 403 MS
R AXIS - MUSE: 9 DEGREES
SYSTOLIC BLOOD PRESSURE - MUSE: NORMAL MMHG
T AXIS - MUSE: 15 DEGREES
TRIGL SERPL-MCNC: 180 MG/DL
VENTRICULAR RATE- MUSE: 65 BPM

## 2024-12-05 PROCEDURE — 86146 BETA-2 GLYCOPROTEIN ANTIBODY: CPT | Performed by: PHYSICIAN ASSISTANT

## 2024-12-05 PROCEDURE — 999N000157 HC STATISTIC RCP TIME EA 10 MIN

## 2024-12-05 PROCEDURE — 82962 GLUCOSE BLOOD TEST: CPT

## 2024-12-05 PROCEDURE — 93005 ELECTROCARDIOGRAM TRACING: CPT

## 2024-12-05 PROCEDURE — 83036 HEMOGLOBIN GLYCOSYLATED A1C: CPT

## 2024-12-05 PROCEDURE — 93010 ELECTROCARDIOGRAM REPORT: CPT | Performed by: INTERNAL MEDICINE

## 2024-12-05 PROCEDURE — 82465 ASSAY BLD/SERUM CHOLESTEROL: CPT

## 2024-12-05 PROCEDURE — 85730 THROMBOPLASTIN TIME PARTIAL: CPT

## 2024-12-05 PROCEDURE — 99222 1ST HOSP IP/OBS MODERATE 55: CPT

## 2024-12-05 PROCEDURE — G0379 DIRECT REFER HOSPITAL OBSERV: HCPCS

## 2024-12-05 PROCEDURE — G0378 HOSPITAL OBSERVATION PER HR: HCPCS

## 2024-12-05 PROCEDURE — 36415 COLL VENOUS BLD VENIPUNCTURE: CPT

## 2024-12-05 PROCEDURE — 85610 PROTHROMBIN TIME: CPT

## 2024-12-05 PROCEDURE — 86147 CARDIOLIPIN ANTIBODY EA IG: CPT | Performed by: PHYSICIAN ASSISTANT

## 2024-12-05 RX ORDER — AMOXICILLIN 250 MG
2 CAPSULE ORAL 2 TIMES DAILY PRN
Status: DISCONTINUED | OUTPATIENT
Start: 2024-12-05 | End: 2024-12-06 | Stop reason: HOSPADM

## 2024-12-05 RX ORDER — AMOXICILLIN 250 MG
1 CAPSULE ORAL 2 TIMES DAILY PRN
Status: DISCONTINUED | OUTPATIENT
Start: 2024-12-05 | End: 2024-12-06 | Stop reason: HOSPADM

## 2024-12-05 RX ORDER — ONDANSETRON 2 MG/ML
4 INJECTION INTRAMUSCULAR; INTRAVENOUS EVERY 6 HOURS PRN
Status: DISCONTINUED | OUTPATIENT
Start: 2024-12-05 | End: 2024-12-06 | Stop reason: HOSPADM

## 2024-12-05 RX ORDER — ACETAMINOPHEN 325 MG/1
650 TABLET ORAL EVERY 4 HOURS PRN
Status: DISCONTINUED | OUTPATIENT
Start: 2024-12-05 | End: 2024-12-06 | Stop reason: HOSPADM

## 2024-12-05 RX ORDER — ONDANSETRON 4 MG/1
4 TABLET, ORALLY DISINTEGRATING ORAL EVERY 6 HOURS PRN
Status: DISCONTINUED | OUTPATIENT
Start: 2024-12-05 | End: 2024-12-06 | Stop reason: HOSPADM

## 2024-12-05 ASSESSMENT — ACTIVITIES OF DAILY LIVING (ADL)
ADLS_ACUITY_SCORE: 24
ADLS_ACUITY_SCORE: 23
ADLS_ACUITY_SCORE: 23
ADLS_ACUITY_SCORE: 24
ADLS_ACUITY_SCORE: 41
ADLS_ACUITY_SCORE: 24
ADLS_ACUITY_SCORE: 23

## 2024-12-05 NOTE — TELEPHONE ENCOUNTER
"Called by Dr Gann at an outside Emergency Department.  The patient, Chino Samuel is a healthy 43 yo that has noticed facial weakness and slurred speech for about 5 days.  MRI positive for stroke, vessel imaging negative per report.      Recommended transfer and inpatient work-up for stroke etiology. Aspirin 325mg now.      If there are beds, happy to accept to [facility]. [facility] is also an option. Alternatively work-up can be done at our other [facility] hospitals, but may not have DAYO, Cardiac CTA, which can be done as an outpatient if indicated.     Toni Villagomez MD, MS  Vascular Neurology    To page me or covering stroke neurology team member, click here: AMCOM   Choose \"On Call\" tab at top, then search dropdown box for \"Neurology Adult\", select location, press Enter, then look for stroke/neuro ICU/telestroke.      "

## 2024-12-05 NOTE — H&P
[facility]    History and Physical - Hospitalist Service       Date of Admission:  12/5/2024    Assessment & Plan    Chino Samuel is a 44 year old male with past medical hx of CASSIE, gout admitted on 12/5/2024. He presented to outside hospital for intermittent slurred speech and left facial droop.     Acute CVA    Balance issues, vertigo, left facial droop, slurred speech started 2-3 days PTA. Symptoms waxed and waned, but have resolved on admission. Recent travel to [place], returning 11/21. No s/s of DVT or PE. His father had a CVA at age 45. His maternal grandmother, and his moms uncle had strokes as well.    Afebrile, not tachycardic, hypertensive to 150s. Given 325mg Aspirin. MRI showed small infarcts in the left caudate tail and striatal capsular region, favored to be subacute. Normal MRA of head and neck.    - Stroke neuro consulted  - TTE in am  - Await stroke neuro recs for daily Aspirin and Plavix    Hyperlipidemia  Not previously treated for unknown reason.  - Await stroke neuro recs for statin    CASSIE  - Continued PTA CPAP         Observation Goals: -diagnostic tests and consults completed and resulted, -vital signs normal or at patient baseline, -safe disposition plan has been identified, Nurse to notify provider when observation goals have been met and patient is ready for discharge.  Diet: Regular Diet Adult  DVT Prophylaxis: Pneumatic Compression Devices  Garcia Catheter: Not present  Lines: None     Cardiac Monitoring: ACTIVE order. Indication: Stroke, acute (48 hours)  Code Status: Full Code    Clinically Significant Risk Factors Present on Admission                                        Disposition Plan     Medically Ready for Discharge: Anticipated Tomorrow         The patient's care was discussed with the Attending Physician, Dr. Ahmadi, Bedside Nurse, Patient, and Patient's Family.    Jeff Balbuena PA-C  Hospitalist Service  [facility]  Securely message with [employer] (more info)  Text page via [facility] Paging/Directory     ______________________________________________________________________    Chief Complaint   Slurred speech    History is obtained from the patient, electronic health record, and patient's significant other    History of Present Illness   Chino Samuel is a 44 year old male who presented to [facility] for strokelike symptoms.    Patient states that 4 days ago he started having a headache, lightheadedness, nasal stuffiness and fatigue.  He felt like he was having flulike symptoms.  3 days ago he developed some issues with his balance such as wobbliness with standing and sensation of spinning.  Over the next 2 days his wife noticed that he was wobbly when walking and seem to have a left facial droop and slurred speech.  These symptoms have waxed and waned and are negative on admission. This improved today, however he had nausea and an episode of vomiting after eating a normal breakfast.  He denies cough, shortness of breath, appetite changes, chest pain, abdominal pain, vision changes, numbness, tingling, sick contacts, fevers.    Of note, his father, maternal grandmother, and his mothers uncle all had CVAs. His father was 45 when he had a CVA. He also works as a  in marketing and recently traveled to [place], returning on 11/21.  He states that his ears did not pressurize correctly as he still feels plugged in the left ear.    He drinks roughly 2-3 times per month. He smoked 1/2 pack per day over 3 years as a teenager, but quit. Smokes occasional marijuana.       Past Medical History    Past Medical History:   Diagnosis Date    CVA (cerebral vascular accident) (H) 12/5/2024    Idiopathic chronic gout of right foot without tophus        Past Surgical History   Past Surgical History:   Procedure Laterality Date    ORTHOPEDIC SURGERY  1999    Left shoulder surgery    SHOULDER SURGERY Left 1999     WISDOM TOOTH EXTRACTION         Prior to Admission Medications   Prior to Admission Medications   Prescriptions Last Dose Informant Patient Reported? Taking?   colchicine (COLCRYS) 0.6 MG tablet   No No   Sig: TAKE 1 TABLET(0.6 MG) BY MOUTH TWICE DAILY   ibuprofen (ADVIL,MOTRIN) 200 MG tablet   Yes No   Sig: Take 200 mg by mouth every 4 hours as needed for mild pain      Facility-Administered Medications: None      2023 UPDATE: these sections are not required for  billing, but can be added when medically relevant     Physical Exam   Vital Signs: Temp: 98.1  F (36.7  C) Temp src: Oral BP: (!) 157/92 Pulse: 72     SpO2: 96 % O2 Device: None (Room air)    Weight: 0 lbs 0 oz    Constitutional: Alert, oriented, cooperative, in no acute distress, appears nontoxic.  HENT: Oropharynx is clear and moist. No evidence of cranial trauma. Normocephalic. Eyes anicteric. EOM intact with fatiguable horizontal nystagmus. PERRLA  Cardiovascular: Regular rate and rhythm, normal S1 and S2, and no murmur noted. Good peripheral pulses in wrists bilaterally. No lower extremity edema. No calf tenderness bilaterally.   Respiratory: Clear to auscultation bilaterally with no adventitious breath sounds.   GI: Soft, non-tender, normal bowel sounds, no hepatosplenomegaly, no masses appreciated.  Musculoskeletal: Normal muscle bulk and tone. FROM in all extremities   Skin: Warm and dry, no rashes on exposed skin.   Psych: Normal affect and speech.  Neurologic: Cranial nerves 2-12 are grossly intact. Gait normal. No pronator drift. Tongue midline. No dysarthria, facial droop. Sensation intact in all extremities bilaterally. Strength 5/5 in , elbow flexion/extension, ankle dorsiflexion/plantarflexion.      Medical Decision Making       60 MINUTES SPENT BY ME on the date of service doing chart review, history, exam, documentation & further activities per the note.      Data     I have personally reviewed the following data over the past 24  hrs:    TSH: N/A T4: N/A A1C: 5.1     INR:  1.04 PTT:  25   D-dimer:  N/A Fibrinogen:  N/A       Imaging results reviewed over the past 24 hrs:   Recent Results (from the past 24 hours)   MRV Brain w/o Contrast    Narrative    EXAM: MR BRAIN W AND WO IV CONTRAST, MR ANGIOGRAM NECK W AND WO IV CONTRAST, MR ANGIOGRAM HEAD WO IV CONTRAST  LOCATION: [facility]  DATE: 12/5/2024    INDICATION: CVA Symptoms, no additional history provided  COMPARISON: None  CONTRAST: 9.5 mL intravenous Gadavist.  TECHNIQUE:   1) Routine multiplanar multisequence head MRI without and with intravenous contrast.  2) 3D time-of-flight head MRA without intravenous contrast.  3) Neck MRA without and with IV contrast. Stenosis measurements made according to NASCET criteria unless otherwise specified.    FINDINGS:  HEAD MRI:  INTRACRANIAL CONTENTS: Small foci of diffusion restriction in the left caudate tail and striatocapsular region with associated FLAIR hyperintensity and minimal enhancement. No mass, acute hemorrhage, or extra-axial fluid collections. Normal brain parenchymal signal other than described above. Normal ventricles and sulci. Normal position of the cerebellar tonsils.    SELLA: No abnormality accounting for technique.    OSSEOUS STRUCTURES/SOFT TISSUES: Normal marrow signal. The major intracranial vascular flow voids are maintained. Small right vertex scalp lesion, likely a sebaceous cyst or other benign lesion.    ORBITS: No abnormality accounting for technique.     SINUSES/MASTOIDS: No paranasal sinus mucosal disease. No middle ear or mastoid effusion.     HEAD MRA:   ANTERIOR CIRCULATION: No stenosis/occlusion, aneurysm, or high flow vascular malformation. Standard [place] of [place] anatomy.    POSTERIOR CIRCULATION: No stenosis/occlusion, aneurysm, or high flow vascular malformation. Balanced vertebral arteries supply a normal basilar artery.     NECK MRA:   RIGHT CAROTID: No measurable stenosis or  dissection.    LEFT CAROTID: No measurable stenosis or dissection.    VERTEBRAL ARTERIES: No focal stenosis or dissection. Balanced vertebral arteries.    AORTIC ARCH: Classic aortic arch anatomy with no significant stenosis at the origin of the great vessels.    Impression    HEAD MRI:   1.  Small infarcts in the left caudate tail and striatocapsular region, favored to be subacute given enhancement.    HEAD MRA:   1.  Normal MRA [place].    NECK MRA:  1.  Normal neck MRA.    This report was electronically interpreted by: Lauren Rosa MD on 12/5/2024 12:19 PM CST   MR BRAIN W AND WO IV CONTRAST    Narrative    EXAM: MR BRAIN W AND WO IV CONTRAST, MR ANGIOGRAM NECK W AND WO IV CONTRAST, MR ANGIOGRAM HEAD WO IV CONTRAST  LOCATION: [facility]  DATE: 12/5/2024    INDICATION: CVA Symptoms, no additional history provided  COMPARISON: None  CONTRAST: 9.5 mL intravenous Gadavist.  TECHNIQUE:   1) Routine multiplanar multisequence head MRI without and with intravenous contrast.  2) 3D time-of-flight head MRA without intravenous contrast.  3) Neck MRA without and with IV contrast. Stenosis measurements made according to NASCET criteria unless otherwise specified.    FINDINGS:  HEAD MRI:  INTRACRANIAL CONTENTS: Small foci of diffusion restriction in the left caudate tail and striatocapsular region with associated FLAIR hyperintensity and minimal enhancement. No mass, acute hemorrhage, or extra-axial fluid collections. Normal brain parenchymal signal other than described above. Normal ventricles and sulci. Normal position of the cerebellar tonsils.    SELLA: No abnormality accounting for technique.    OSSEOUS STRUCTURES/SOFT TISSUES: Normal marrow signal. The major intracranial vascular flow voids are maintained. Small right vertex scalp lesion, likely a sebaceous cyst or other benign lesion.    ORBITS: No abnormality accounting for technique.     SINUSES/MASTOIDS: No paranasal sinus mucosal disease. No  middle ear or mastoid effusion.     HEAD MRA:   ANTERIOR CIRCULATION: No stenosis/occlusion, aneurysm, or high flow vascular malformation. Standard [place] of [place] anatomy.    POSTERIOR CIRCULATION: No stenosis/occlusion, aneurysm, or high flow vascular malformation. Balanced vertebral arteries supply a normal basilar artery.     NECK MRA:   RIGHT CAROTID: No measurable stenosis or dissection.    LEFT CAROTID: No measurable stenosis or dissection.    VERTEBRAL ARTERIES: No focal stenosis or dissection. Balanced vertebral arteries.    AORTIC ARCH: Classic aortic arch anatomy with no significant stenosis at the origin of the great vessels.    Impression    HEAD MRI:   1.  Small infarcts in the left caudate tail and striatocapsular region, favored to be subacute given enhancement.    HEAD MRA:   1.  Normal MRA [place].    NECK MRA:  1.  Normal neck MRA.    This report was electronically interpreted by: Lauren Rosa MD on 12/5/2024 12:19 PM CST   MRA Neck (Carotids) w/o & w Contrast    Narrative    EXAM: MR BRAIN W AND WO IV CONTRAST, MR ANGIOGRAM NECK W AND WO IV CONTRAST, MR ANGIOGRAM HEAD WO IV CONTRAST  LOCATION: [facility]  DATE: 12/5/2024    INDICATION: CVA Symptoms, no additional history provided  COMPARISON: None  CONTRAST: 9.5 mL intravenous Gadavist.  TECHNIQUE:   1) Routine multiplanar multisequence head MRI without and with intravenous contrast.  2) 3D time-of-flight head MRA without intravenous contrast.  3) Neck MRA without and with IV contrast. Stenosis measurements made according to NASCET criteria unless otherwise specified.    FINDINGS:  HEAD MRI:  INTRACRANIAL CONTENTS: Small foci of diffusion restriction in the left caudate tail and striatocapsular region with associated FLAIR hyperintensity and minimal enhancement. No mass, acute hemorrhage, or extra-axial fluid collections. Normal brain parenchymal signal other than described above. Normal ventricles and sulci. Normal  position of the cerebellar tonsils.    SELLA: No abnormality accounting for technique.    OSSEOUS STRUCTURES/SOFT TISSUES: Normal marrow signal. The major intracranial vascular flow voids are maintained. Small right vertex scalp lesion, likely a sebaceous cyst or other benign lesion.    ORBITS: No abnormality accounting for technique.     SINUSES/MASTOIDS: No paranasal sinus mucosal disease. No middle ear or mastoid effusion.     HEAD MRA:   ANTERIOR CIRCULATION: No stenosis/occlusion, aneurysm, or high flow vascular malformation. Standard [place] of [place] anatomy.    POSTERIOR CIRCULATION: No stenosis/occlusion, aneurysm, or high flow vascular malformation. Balanced vertebral arteries supply a normal basilar artery.     NECK MRA:   RIGHT CAROTID: No measurable stenosis or dissection.    LEFT CAROTID: No measurable stenosis or dissection.    VERTEBRAL ARTERIES: No focal stenosis or dissection. Balanced vertebral arteries.    AORTIC ARCH: Classic aortic arch anatomy with no significant stenosis at the origin of the great vessels.    Impression    HEAD MRI:   1.  Small infarcts in the left caudate tail and striatocapsular region, favored to be subacute given enhancement.    HEAD MRA:   1.  Normal MRA [place] of [place].    NECK MRA:  1.  Normal neck MRA.    This report was electronically interpreted by: Lauren Rosa MD on 12/5/2024 12:19 PM CST

## 2024-12-05 NOTE — INTERIM SUMMARY
Interval note    Paged for potential transfer from [facility] for patient with stroke.  Patient was assessed for strokelike symptoms and found to have stroke on MRI imaging.  Neurology was consulted and felt that patient was appropriate for admission to facility with availability of neurology and did not need a facility that had inpatient transesophageal echo or CTA availability.  Called and discussed with the ED provider at [facility] who described the patient is hemodynamically stable with near resolution of his symptoms.  No other concerning laboratory findings.    Patient accepted for transfer from [facility] to medical surgery bed with telemetry.    Sohail Ahmadi MD

## 2024-12-05 NOTE — PROGRESS NOTES
Reviewing chart due to high probability of admit to Med/Surg unit.     Demarco Meadows RN on 12/5/2024 at 3:05 PM      Pt is declined for admit to Med Surg unit at this time as there is no MD notes to indicate reason for hospitalization, and no MD orders. [facility] aware.  Demarco Meadows RN on 12/5/2024 at 3:07 PM        Pt now accepted to [facility]/Surg after discussion with Dr. Ahmadi. Dr. Ahmadi will place orders once Pt has arrived. Writer reviewed Stroke MD notes. Primary RN Kiya aware.  Demarco Meadows RN on 12/5/2024 at 3:51 PM

## 2024-12-06 ENCOUNTER — APPOINTMENT (OUTPATIENT)
Dept: ULTRASOUND IMAGING | Facility: CLINIC | Age: 44
End: 2024-12-06
Attending: PHYSICIAN ASSISTANT
Payer: COMMERCIAL

## 2024-12-06 ENCOUNTER — APPOINTMENT (OUTPATIENT)
Dept: CARDIOLOGY | Facility: CLINIC | Age: 44
End: 2024-12-06
Payer: COMMERCIAL

## 2024-12-06 ENCOUNTER — ORDERS ONLY (AUTO-RELEASED) (OUTPATIENT)
Dept: MEDSURG UNIT | Facility: CLINIC | Age: 44
End: 2024-12-06

## 2024-12-06 VITALS
SYSTOLIC BLOOD PRESSURE: 134 MMHG | BODY MASS INDEX: 28.36 KG/M2 | HEIGHT: 71 IN | HEART RATE: 76 BPM | OXYGEN SATURATION: 94 % | RESPIRATION RATE: 18 BRPM | WEIGHT: 202.6 LBS | DIASTOLIC BLOOD PRESSURE: 83 MMHG | TEMPERATURE: 99.4 F

## 2024-12-06 DIAGNOSIS — I63.10 CEREBROVASCULAR ACCIDENT (CVA) DUE TO EMBOLISM OF PRECEREBRAL ARTERY (H): ICD-10-CM

## 2024-12-06 LAB
AMPHETAMINES UR QL SCN: NORMAL
APTT PPP: 26 SECONDS (ref 22–38)
BARBITURATES UR QL SCN: NORMAL
BENZODIAZ UR QL SCN: NORMAL
BZE UR QL SCN: NORMAL
CANNABINOIDS UR QL SCN: NORMAL
FACTOR 2 INTERPRETATION: NORMAL
FACTOR V INTERPRETATION: NORMAL
FENTANYL UR QL: NORMAL
GLUCOSE BLDC GLUCOMTR-MCNC: 121 MG/DL (ref 70–99)
HOLD SPECIMEN: NORMAL
HOLD SPECIMEN: NORMAL
INR PPP: 1.04 (ref 0.85–1.15)
LAB DIRECTOR COMMENTS: NORMAL
LAB DIRECTOR DISCLAIMER: NORMAL
LAB DIRECTOR INTERPRETATION: NORMAL
LAB DIRECTOR METHODOLOGY: NORMAL
LAB DIRECTOR RESULTS: NORMAL
LOCATION OF TASK: NORMAL
LVEF ECHO: NORMAL
OPIATES UR QL SCN: NORMAL
PCP QUAL URINE (ROCHE): NORMAL
SPECIMEN TYPE: NORMAL
URATE SERPL-MCNC: 8 MG/DL (ref 3.4–7)

## 2024-12-06 PROCEDURE — 85730 THROMBOPLASTIN TIME PARTIAL: CPT | Performed by: PHYSICIAN ASSISTANT

## 2024-12-06 PROCEDURE — 81241 F5 GENE: CPT | Performed by: PHYSICIAN ASSISTANT

## 2024-12-06 PROCEDURE — G0378 HOSPITAL OBSERVATION PER HR: HCPCS

## 2024-12-06 PROCEDURE — 999N000208 ECHOCARDIOGRAM COMPLETE

## 2024-12-06 PROCEDURE — 85300 ANTITHROMBIN III ACTIVITY: CPT | Performed by: PHYSICIAN ASSISTANT

## 2024-12-06 PROCEDURE — 250N000013 HC RX MED GY IP 250 OP 250 PS 637: Performed by: STUDENT IN AN ORGANIZED HEALTH CARE EDUCATION/TRAINING PROGRAM

## 2024-12-06 PROCEDURE — 85390 FIBRINOLYSINS SCREEN I&R: CPT | Mod: 26 | Performed by: PATHOLOGY

## 2024-12-06 PROCEDURE — 85306 CLOT INHIBIT PROT S FREE: CPT | Performed by: PHYSICIAN ASSISTANT

## 2024-12-06 PROCEDURE — 85303 CLOT INHIBIT PROT C ACTIVITY: CPT | Performed by: PHYSICIAN ASSISTANT

## 2024-12-06 PROCEDURE — 99239 HOSP IP/OBS DSCHRG MGMT >30: CPT | Performed by: STUDENT IN AN ORGANIZED HEALTH CARE EDUCATION/TRAINING PROGRAM

## 2024-12-06 PROCEDURE — G0452 MOLECULAR PATHOLOGY INTERPR: HCPCS | Mod: 26 | Performed by: PATHOLOGY

## 2024-12-06 PROCEDURE — 85610 PROTHROMBIN TIME: CPT | Performed by: PHYSICIAN ASSISTANT

## 2024-12-06 PROCEDURE — G0426 INPT/ED TELECONSULT50: HCPCS | Mod: G0 | Performed by: PHYSICIAN ASSISTANT

## 2024-12-06 PROCEDURE — 93306 TTE W/DOPPLER COMPLETE: CPT | Mod: 26 | Performed by: INTERNAL MEDICINE

## 2024-12-06 PROCEDURE — 84550 ASSAY OF BLOOD/URIC ACID: CPT | Performed by: STUDENT IN AN ORGANIZED HEALTH CARE EDUCATION/TRAINING PROGRAM

## 2024-12-06 PROCEDURE — 82962 GLUCOSE BLOOD TEST: CPT

## 2024-12-06 PROCEDURE — 93970 EXTREMITY STUDY: CPT

## 2024-12-06 PROCEDURE — 80307 DRUG TEST PRSMV CHEM ANLYZR: CPT | Performed by: PHYSICIAN ASSISTANT

## 2024-12-06 PROCEDURE — 93306 TTE W/DOPPLER COMPLETE: CPT

## 2024-12-06 PROCEDURE — 36415 COLL VENOUS BLD VENIPUNCTURE: CPT | Performed by: PHYSICIAN ASSISTANT

## 2024-12-06 RX ORDER — ATORVASTATIN CALCIUM 20 MG/1
40 TABLET, FILM COATED ORAL EVERY EVENING
Status: DISCONTINUED | OUTPATIENT
Start: 2024-12-06 | End: 2024-12-06 | Stop reason: HOSPADM

## 2024-12-06 RX ORDER — ATORVASTATIN CALCIUM 40 MG/1
40 TABLET, FILM COATED ORAL EVERY EVENING
Qty: 30 TABLET | Refills: 0 | Status: SHIPPED | OUTPATIENT
Start: 2024-12-06

## 2024-12-06 RX ORDER — ASPIRIN 81 MG/1
81 TABLET ORAL DAILY
Qty: 30 TABLET | Refills: 0 | Status: SHIPPED | OUTPATIENT
Start: 2024-12-07

## 2024-12-06 RX ORDER — ASPIRIN 81 MG/1
81 TABLET ORAL DAILY
Status: DISCONTINUED | OUTPATIENT
Start: 2024-12-07 | End: 2024-12-06 | Stop reason: HOSPADM

## 2024-12-06 RX ADMIN — ATORVASTATIN CALCIUM 40 MG: 20 TABLET, FILM COATED ORAL at 16:50

## 2024-12-06 ASSESSMENT — ACTIVITIES OF DAILY LIVING (ADL)
ADLS_ACUITY_SCORE: 23
ADLS_ACUITY_SCORE: 23
ADLS_ACUITY_SCORE: 24
ADLS_ACUITY_SCORE: 23
ADLS_ACUITY_SCORE: 24
ADLS_ACUITY_SCORE: 24
ADLS_ACUITY_SCORE: 23
ADLS_ACUITY_SCORE: 24
ADLS_ACUITY_SCORE: 23
ADLS_ACUITY_SCORE: 23
ADLS_ACUITY_SCORE: 24
ADLS_ACUITY_SCORE: 23

## 2024-12-06 NOTE — PROGRESS NOTES
[place] [facility] DISCHARGE NOTE    Patient discharged to home at 5:56 PM via ambulation. Accompanied by spouse and staff. Discharge instructions reviewed with patient and spouse, opportunity offered to ask questions. Prescriptions sent to patients preferred pharmacy. All belongings sent with patient.    Zainab Powers RN

## 2024-12-06 NOTE — PLAN OF CARE
"  Problem: Adult Inpatient Plan of Care  Goal: Plan of Care Review  Description: The Plan of Care Review/Shift note should be completed every shift.  The Outcome Evaluation is a brief statement about your assessment that the patient is improving, declining, or no change.  This information will be displayed automatically on your shift  note.  Outcome: Progressing  Goal: Patient-Specific Goal (Individualized)  Description: You can add care plan individualizations to a care plan. Examples of Individualization might be:  \"Parent requests to be called daily at 9am for status\", \"I have a hard time hearing out of my right ear\", or \"Do not touch me to wake me up as it startles  me\".  Outcome: Progressing  Goal: Absence of Hospital-Acquired Illness or Injury  Outcome: Progressing  Intervention: Identify and Manage Fall Risk  Recent Flowsheet Documentation  Taken 12/6/2024 1000 by Vee Bailey, RN  Safety Promotion/Fall Prevention:   activity supervised   clutter free environment maintained   lighting adjusted   nonskid shoes/slippers when out of bed   patient and family education  Intervention: Prevent and Manage VTE (Venous Thromboembolism) Risk  Recent Flowsheet Documentation  Taken 12/6/2024 1000 by Vee Bailey, RN  VTE Prevention/Management: (Patient is up IND in room- walking freuently) other (see comments)  Goal: Optimal Comfort and Wellbeing  Outcome: Progressing  Goal: Readiness for Transition of Care  Outcome: Progressing     Problem: Stroke, Ischemic (Includes Transient Ischemic Attack)  Goal: Optimal Coping  Outcome: Progressing  Goal: Effective Bowel Elimination  Outcome: Progressing  Goal: Optimal Cerebral Tissue Perfusion  Outcome: Progressing  Goal: Optimal Cognitive Function  Outcome: Progressing  Goal: Improved Communication Skills  Outcome: Progressing  Goal: Optimal Functional Ability  Outcome: Progressing  Intervention: Optimize Functional Ability  Recent Flowsheet Documentation  Taken " "12/6/2024 1300 by Vee Bailey, RN  Activity Management: up ad jean-claude  Taken 12/6/2024 1000 by Vee Bailey RN  Activity Management: up ad jean-claude  Goal: Optimal Nutrition Intake  Outcome: Progressing  Goal: Effective Oxygenation and Ventilation  Outcome: Progressing  Goal: Improved Sensorimotor Function  Outcome: Progressing  Goal: Safe and Effective Swallow  Outcome: Progressing  Goal: Effective Urinary Elimination  Outcome: Progressing   Goal Outcome Evaluation:       Patient is A&O x4 and IND in room. Neuro's are WNL, telemetry is normal sinus rhythm. Echo completed and stroke/neuro was able to meet with the patient. Order for an ultrasound and MRV- these have not be completed yet, awaiting to hear when they can take patient.  /85 (BP Location: Left arm)   Pulse 62   Temp 98.3  F (36.8  C) (Oral)   Resp 16   Ht 1.791 m (5' 10.5\")   Wt 91.9 kg (202 lb 9.6 oz)   SpO2 94%   BMI 28.66 kg/m    Kalyani Bailey RN BSN                   "

## 2024-12-06 NOTE — DISCHARGE INSTRUCTIONS
Your risk factors for stroke or TIA (transient ischemic attack):     Your Risk Factors Your Results Goals   [] High blood pressure BP: (!) 157/92 (24 1651) Less than 120/80   [] Cholesterol          Total 2024: 216 mg/dL   Less than 150    Triglycerides   2024: 180 mg/dL Less than 150    LDL 2024: 148 mg/dL    Less than 70    HDL 2024: 32 mg/dL         Greater than 40 (men)  Greater than 50 (women)   [] Diabetes                A1C 2024: 5.1 % Less than 5.7   [] Atrial fibrillation Atrial fibrillation noted on cardiac monitoring Manage per physician orders   [] Smoking/tobacco use   Tobacco Use      Smoking status: Former        Packs/day: 0.00        Years: 0.5 packs/day for 3.6 years (1.8 ttl pk-yrs)        Types: Cigarettes        Start date: 12/3/1996        Quit date: 7/10/2000        Years since quittin.4        Passive exposure: Past      Smokeless tobacco: Never      Tobacco comments: Brief cigarette usage   Quit smoking and tobacco   [] Overweight Body mass index is 28.66 kg/m .  Less than 25     Other risk factors include: carotid (neck) artery disease, other heart diseases, prior stroke or TIA, poor diet, lack of exercise, and excessive alcohol consumption.     [x] Written stroke educational materials given to patient including:   - Learning about BE FAST: Stroke Warning Signs and Learning about Risk Factors for Stroke (Healthwise)        Know the warning signs and symptoms of stroke: BE FAST     B = Balance loss   E = Eyesight changes   F = Facial droop or numbness   A = Arm or leg weakness   S = Speech difficulty, slurred speech   T = Time to call 911 for help

## 2024-12-06 NOTE — DISCHARGE SUMMARY
"[facility]  Hospitalist Discharge Summary      Date of Admission:  12/5/2024  Date of Discharge:  12/6/2024  Discharging Provider: Lyle Dixon DO  Discharge Service: Hospitalist Service    Discharge Diagnoses   Acute CVA (Left caudate tail and striatal capsular subacute ischemic infarcts)  Hyperlipidemia  CASSIE  Gout  Hypertension    Clinically Significant Risk Factors     # Overweight: Estimated body mass index is 28.66 kg/m  as calculated from the following:    Height as of this encounter: 1.791 m (5' 10.5\").    Weight as of this encounter: 91.9 kg (202 lb 9.6 oz).       Follow-ups Needed After Discharge   Follow-up Appointments       Follow-up and recommended labs and tests       Follow up with primary care provider, Toni Freedman, within 7 days for hospital follow- up.  No follow up labs or test are needed.              Unresulted Labs Ordered in the Past 30 Days of this Admission       Date and Time Order Name Status Description    12/6/2024  1:27 PM Protein S Antigen Free In process     12/6/2024  1:27 PM Protein C chromogenic In process     12/6/2024  1:27 PM Lupus Anticoagulant Panel In process     12/6/2024  1:27 PM Cardiolipin Sonja IgG and IgM In process     12/6/2024  1:27 PM Beta 2 Glycoprotein 1 Antibody IgM In process     12/6/2024  1:27 PM Beta 2 Glycoprotein 1 Antibody IgG In process     12/6/2024  1:27 PM Antithrombin III In process     12/6/2024 12:32 PM Urine Drug Screen Panel In process         These results will be followed up by PCP.     Discharge Disposition   Discharged to home  Condition at discharge: Stable    Hospital Course    Chino Samuel is a 44 year old male with past medical hx of CASSIE, gout admitted on 12/5/2024. He presented to outside hospital for intermittent slurred speech and left facial droop.     Acute CVA (Left caudate tail and striatal capsular subacute ischemic infarcts)  Likely undiagnosed HTN   Congenital malformations of cardiac chambers, " connections and septa     Balance issues, vertigo, left facial droop, slurred speech started 2-3 days PTA. Symptoms waxed and waned, but have resolved on admission. Recent travel to [place], returning 11/21. No s/s of DVT or PE. His father had a CVA at age 45. His maternal grandmother, and his moms uncle had strokes as well.    Afebrile, not tachycardic, hypertensive to 150s. Given 325mg Aspirin. MRI showed small infarcts in the left caudate tail and striatal capsular region, favored to be subacute. Normal MRA of head and neck. Echo with mildly positive flow across interatrial septum indicating likely PFO. Also, evidence of LVH on echo indicating long-standing HTN. Telemetry without arrhythmia.  (elevated), HbA1c 5.1%. ROPE score 6 (62% stroke due to PFO).    - Continue ASA 81 mg and atorvastatin 40 mg daily   - DAYO outpatient   - Lower extremity US pending  - MRV pelvis w/ contrast  - 14 day Ziopatch ordered  - Follow up with PCP in 1 week  - Follow up with Neurostroke 6-8 weeks  - Hypercoagulable labs pending    Hyperlipidemia  Not previously treated for unknown reason. , cholesterol 216, HDL 32.   - Continue atorvastatin 40 mg  - Recommend LDL goal 40-70, <40 increases risk of intracranial hemorrhage     CASSIE  - Continued PTA CPAP    Gout  Increases risk of CVD and stroke. Uric acid 8.4  - Uric acid level pending  - May benefit from uric acid lowering agent (allopurinol) initiation in outpatient setting     Consultations This Hospital Stay   NEUROLOGY IP STROKE CONSULT  PHARMACY IP CONSULT  PHARMACY IP CONSULT  PHARMACY IP CONSULT  CARE MANAGEMENT / SOCIAL WORK IP CONSULT  SMOKING CESSATION PROGRAM IP CONSULT    Code Status   Full Code    Time Spent on this Encounter   Lyle FITZGERALD DO, personally saw the patient today and spent greater than 30 minutes discharging this patient.       Lyle Dixon DO  [facility] SURGICAL  [address] [address]  Phone:  [phone]  Fax: [phone]  ______________________________________________________________________    Physical Exam   Vital Signs: Temp: 99.4  F (37.4  C) Temp src: Oral BP: 134/83 Pulse: 76   Resp: 18 SpO2: 94 % O2 Device: None (Room air)    Weight: 202 lbs 9.64 oz    Constitutional: awake, alert, cooperative, no apparent distress, and appears stated age  Respiratory: No increased work of breathing, good air exchange  Cardiovascular: Regular rate and rhythm, normal S1 and S2, no S3 or S4, and no murmur noted  GI: No scars, normal bowel sounds, soft, non-distended, non-tender, no masses palpated, no hepatosplenomegally  Skin: normal skin color, texture, turgor  Musculoskeletal: There is no redness, warmth, or swelling of the joints.  Full range of motion noted.  Motor strength is 5 out of 5 all extremities bilaterally.  Tone is normal.       Primary Care Physician   Toni Freedman    Discharge Orders      MRV Pelvis wwo Contrast    Administration of IV contrast (contrast agent, dose, and amount) will be tailored to this examination per the appropriate written protocol listed in the Protocol E-Book, or by the interpreting provider. If you do not want your order altered by the radiologist, please state that in the order comments.     Reason for your hospital stay    Acute CVA     Follow-up and recommended labs and tests     Follow up with primary care provider, Toni Freedman, within 7 days for hospital follow- up.  No follow up labs or test are needed.     Activity    Your activity upon discharge: activity as tolerated     Echocardiogram DAYO    Administration of IV contrast will be tailored to this examination per the appropriate written protocol listed in the Echocardiography department Protocol Book, or by the supervising Cardiologist. This may result in an order change.    Use of contrast is at the discretion of the supervising Cardiologist.     Diet    Follow this diet upon discharge:    Regular  Diet Adult     Stroke Hospital Follow Up (for neurologist use only)    [facility] will call you to coordinate care as prescribed by your provider. If you don t hear from a representative within 2 business days, please call [phone].       [facility]       Significant Results and Procedures   Most Recent 3 CBC's:  Recent Labs   Lab Test 02/15/19  0746   WBC 6.8   HGB 15.9   MCV 89        Most Recent 3 BMP's:  Recent Labs   Lab Test 24  1122 24  2227 24  1727 10/01/24  0814 09/15/23  0920 22  0746   NA  --   --   --  137 143 138   POTASSIUM  --   --   --  4.2 4.3 4.1   CHLORIDE  --   --   --  102 104 101   CO2  --   --   --  24 27 25   BUN  --   --   --  10.6 9.9 15.4   CR  --   --   --  1.13 1.19* 1.21*   ANIONGAP  --   --   --  11 12 12   JACK  --   --   --  9.1 9.3 10.1*   * 93 110* 97 94 96     Most Recent Cholesterol Panel:  Recent Labs   Lab Test 24  1716   CHOL 216*   *   HDL 32*   TRIG 180*     Most Recent TSH and T4:  Recent Labs   Lab Test 02/15/19  0746   TSH 1.17     Most Recent Hemoglobin A1c:  Recent Labs   Lab Test 24  1716   A1C 5.1   ,   Results for orders placed or performed during the hospital encounter of 24   Echocardiogram Complete - For age > 60 yrs     Value    LVEF  55-60%    Narrative    [id number]  [id number]  [id number]  [id number]^NETO^FRANCISCO JAVIER     [facility]  Echocardiography Laboratory  [address] [place] [address]     Name: PAMELA VALENCIA  MRN: [id number]  : 1980  Study Date: 2024 11:33 AM  Age: 44 yrs  Gender: Male  Patient Location: [facility]  Reason For Study: Cerebrovascular Incident  Ordering Physician: FRANCISCO JAVIER DUQUE  Referring Physician: LORRAINE STANLEY  Performed By: Tricia Valente     BSA: 2.1 m2  Height: 70 in  Weight: 202 lb  HR: 70  BP: 157/92 mmHg  ______________________________________________________________________________  Procedure  Complete Portable  Bubble Echo Adult.  ______________________________________________________________________________  Interpretation Summary     1. The left ventricle is normal in size. There is mild concentric left  ventricular hypertrophy. Left ventricular systolic function is normal. The  visual ejection fraction is 55-60%. No regional wall motion abnormalities  noted.  2. The right ventricle is normal size. The right ventricular systolic function  is normal.  3. Normal left atrial size. Right atrial size is normal. There is no color  Doppler evidence of an atrial shunt. A contrast injection (Bubble Study) was  performed that was mildly positive for flow across the interatrial septum.  4. Trace mitral and tricuspid regurgitation.  5. The aortic Sinus(es) of Valsalva are borderline dilated.  6. No pericardial effusion.  7. No previous study for comparison.  ______________________________________________________________________________  Left Ventricle  The left ventricle is normal in size. There is mild concentric left  ventricular hypertrophy. Left ventricular systolic function is normal. The  visual ejection fraction is 55-60%. No regional wall motion abnormalities  noted.     Right Ventricle  The right ventricle is normal size. The right ventricular systolic function is  normal.     Atria  Normal left atrial size. Right atrial size is normal. There is no color  Doppler evidence of an atrial shunt. A contrast injection (Bubble Study) was  performed that was mildly positive for flow across the interatrial septum.     Mitral Valve  There is trace mitral regurgitation.     Tricuspid Valve  There is trace to mild tricuspid regurgitation. The right ventricular systolic  pressure is approximated at 16.9 mmHg plus the right atrial pressure.     Aortic Valve  The aortic valve is trileaflet. No aortic regurgitation is present. No aortic  stenosis is present.     Pulmonic Valve  There is trace to mild pulmonic valvular regurgitation. There  is no pulmonic  valvular stenosis.     Vessels  The aortic Sinus(es) of Valsalva are borderline dilated. Normal size ascending  aorta. Descending aortic velocity normal.     Pericardium  There is no pericardial effusion.     Rhythm  Sinus rhythm was noted.  ______________________________________________________________________________  MMode/2D Measurements & Calculations  RVDd: 3.6 cm  IVSd: 1.2 cm     LVIDd: 4.7 cm  LVIDs: 3.1 cm  LVPWd: 0.95 cm  FS: 34.2 %  LV mass(C)d: 173.2 grams  LV mass(C)dI: 82.6 grams/m2  Ao root diam: 3.9 cm  asc Aorta Diam: 3.4 cm  LVOT diam: 2.3 cm  LVOT area: 4.0 cm2  Ao root diam index Ht(cm/m): 2.2  Ao root diam index BSA (cm/m2): 1.8  Asc Ao diam index BSA (cm/m2): 1.6  Asc Ao diam index Ht(cm/m): 1.9  LA Volume (BP): 50.2 ml     LA Volume Index (BP): 23.9 ml/m2  LA Volume Indexed (AL/bp): 25.1 ml/m2  RWT: 0.41  TAPSE: 2.3 cm     Doppler Measurements & Calculations  MV E max garret: 65.4 cm/sec  MV A max garret: 47.1 cm/sec  MV E/A: 1.4  MV dec time: 0.18 sec     Ao V2 max: 138.0 cm/sec  Ao max P.0 mmHg  Ao V2 mean: 98.4 cm/sec  Ao mean P.4 mmHg  Ao V2 VTI: 28.1 cm  HEATHER(I,D): 2.8 cm2  HEATHER(V,D): 2.8 cm2  LV V1 max PG: 3.7 mmHg  LV V1 max: 96.1 cm/sec  LV V1 VTI: 19.9 cm  SV(LVOT): 79.4 ml  SI(LVOT): 37.9 ml/m2  TR max garret: 205.0 cm/sec  TR max P.9 mmHg  AV Garret Ratio (DI): 0.70  HEATHER Index (cm2/m2): 1.3  E/E' av.9  Lateral E/e': 5.0  Medial E/e': 6.8  RV S Garret: 13.1 cm/sec     ______________________________________________________________________________  Report approved by: Gabe Heredia MD on 2024 12:38 PM             Discharge Medications   Current Discharge Medication List        START taking these medications    Details   aspirin 81 MG EC tablet Take 1 tablet (81 mg) by mouth daily.  Qty: 30 tablet, Refills: 0    Associated Diagnoses: Cerebrovascular accident (CVA), unspecified mechanism (H)      atorvastatin (LIPITOR) 40 MG tablet Take 1 tablet (40 mg) by mouth every  evening.  Qty: 30 tablet, Refills: 0    Associated Diagnoses: Cerebrovascular accident (CVA), unspecified mechanism (H)           CONTINUE these medications which have NOT CHANGED    Details   colchicine (COLCRYS) 0.6 MG tablet TAKE 1 TABLET(0.6 MG) BY MOUTH TWICE DAILY  Qty: 60 tablet, Refills: 3    Associated Diagnoses: Chronic idiopathic gout involving toe of right foot without tophus      Pseudoeph-Doxylamine-DM-APAP (NYQUIL LIQUICAPS OR) Take 2 capsules by mouth as needed.      ibuprofen (ADVIL,MOTRIN) 200 MG tablet Take 1 tablet by mouth every 4 hours as needed for mild pain.           Allergies   Allergies   Allergen Reactions    Indomethacin Other (See Comments)     PN: HA

## 2024-12-06 NOTE — PHARMACY-CONSULT NOTE
Pharmacy Consult to evaluate for medication related stroke core measures    Chino Samuel, 44 year old male admitted for ischemic stroke on 12/5/2024.    Thrombolytic was not given. Time from onset contraindications existed based on documentation.    VTE Prophylaxis  Patient ambulating - if this is sufficient for VTE prophylaxis, please ensure there is documentation of this being sufficient.    Antithrombotic: Aspirin 325 mg was reportedly given at [facility] on 12/5/2024 at 1350. Antithrombotic not currently ordered at this hospital. Please ensure this is addressed. Continue antithrombotic therapy on discharge to meet quality measures, unless contraindicated.    Anticoagulation if history of A-fib/flutter: Patient does not have history of A-fib/flutter - anticoagulation not required for medication related stroke core measures.     LDL Cholesterol Calculated   Date Value Ref Range Status   12/05/2024 148 (H) <100 mg/dL Final   08/13/2020 131 (H) <100 mg/dL Final     Comment:     Above desirable:  100-129 mg/dl  Borderline High:  130-159 mg/dL  High:             160-189 mg/dL  Very high:       >189 mg/dl         Statin therapy not currently ordered. Of note, LDL on 12/5/2024 was found to be 148 mg/dL. Please ensure statin therapy is ordered on discharge to meet quality measures, unless contraindicated.    Recommendations:  - If ambulation is sufficient for VTE prophylaxis, please ensure there is documentation of this being sufficient.  - Please ensure antithrombotic therapy is ordered and continued on discharge to meet quality measures, unless contraindicated.  - Please ensure statin therapy is ordered on discharge to meet quality measures, unless contraindicated.    Thank you for the consult.    Jaspal Francisco [facility] 12/6/2024 2:02 PM

## 2024-12-06 NOTE — CONSULTS
[facility]    Stroke Consult Note    Reason for Consult: Stroke    Chief Complaint: No chief complaint on file.       HPI  Chino Samuel is a 44 year old male history of HLD, CASSIE, gout.  Pertinent family history of father with CVA at age 45. Not on PTA ASA/statin.    He presented to [facility] 12/5/2024.  Due to balance issues, vertigo, left facial droop, slurred speech x 2-3 days.   Provider there contacted vascular neurologist, Dr. Villagomez, who made recommendations on admission and work-up (see telephone note). Was transferred to the [facility] emergency department.  MRI showed Left caudate tail and striatocapsular infarct. There was no concern on ED provider exam for DVT or PE. He was started on ASA.     He describes that he was working at home on emails and noticed lightheadedness/dizziness yesterday.  He describes it as similar to playing the [employer] game.  He went to lay down but symptoms did not improve and he vomited.  His wife went with him to the emergency department and noted that his speech was slurred, seemed to be slurring more outside of the left side of the mouth but no significant facial droop appreciated.  She did recall that Monday night he also had an episode of similar speech slurring out of the left side of his mouth.  Symptoms at that time lasted approximately 20 minutes.  He also has had a headache to the top bilateral portion of his head since Sunday.  Wednesday he took NyQuil for it.  Yesterday initially the symptoms worsened and he again vomited at the hospital then slowly symptoms resolved.  The nausea went away after receiving antinausea medications in the ED.  He denies that there was any extremity weakness or numbness.  Denies vision loss or diplopia, was just hard to focus while they were driving due to the room spinning.    He had recently traveled to [place] and returned 11/21.  Denies any pain or swelling or erythema to extremities.   "Denies shortness of breath.  He did \"throw out\" his back recently however denies any head or neck injury.    He does not check his blood pressure at home but in the past reports it has been maybe borderline high, not alarming ever in clinic.  Does not believe top number has been over 130.  He is a former smoker, quit at 19 years old.  Drinks approximately 1-2 alcoholic beverages per week.  Use cannabis couple times per week.  Denies nonprescription medications, illicit drug use or herbal supplements.  He has been diagnosed with sleep apnea in the past and uses CPAP.  Denies fevers, night sweats, or unintentional weight loss.  His father had a stroke at 45 years old around the time when he had stage IV throat cancer.  His maternal side of the family has some history of stroke as well.  His maternal grandmother also had throat cancer and a TIA but she was in her 80s.  Denies any known personal or family history of DVT/PE/blood clotting disorders.  No known family history of recurrent or late term miscarriages.      Stroke Evaluation Summarized    MRI/Head CT MRI [facility] 12/5/24 (images uploaded to PACs now): Small infarcts in the left caudate tail and striatocapsular region, favored to be subacute given enhancement   Intracranial Vasculature MRA head [facility] 12/5/24: no significant stenosis or LVO   Cervical Vasculature MRA neck [facility] 12/5/24: no significant stenosis or LVO     Echocardiogram TTE: mild LVH, EF 55-60%, no wma, RV norm, norm b/l atria, no color doppler evidence of atrial shunt, mildly positive flow across interatrial septum   EKG/Telemetry NSR   Other Testing Hypercoagulable workup: Pending  Antithrombin III  Factor II/V  INR  Lupus anticoagulant  APTT  Protein C  Protein S    UDS: Pending    MRV pelvis: pending outpatient  DAYO: pending outpatient     LDL  12/5/2024: 148 mg/dL   A1C  12/5/2024: 5.1 %   Troponin No lab value available in past 48 hrs       Impression/Recommendations   #Left caudate tail and " striatal capsular subacute ischemic infarcts, although this territory could sometimes be due to small vessel disease the size is more concerning for embolic stroke of undetermined source (ESUS), potentially paradoxical embolism from PFO, ROPE score 6 (62% stroke due to PFO)  -Neuro checks and vitals every 4 hours  -ASA 81 mg daily indefinitely  -Ordered bilateral lower extremity ultrasound to evaluate for DVT  -Ordered MRV pelvis to evaluate for DVT outpatient   -Will need DAYO outpatient within 1 week  -Ordered hypercoagulable work-up  -telemetry, 14 day Zio Patch to be mailed out at discharge if no atrial fibrillation found on telemetry (ordered)  -PT/OT/SPT, Dysphagia screen  -blood glucose monitoring, long term A1c goal <7%  -Euthermia, euglycemia, eunatremia  -Stroke Education, reviewed [facility] stroke symptoms    #Elevated blood pressures, suspect undiagnosed hypertension (LVH on TTE)  -appreciate management per primary team  -Inpatient SBP goal <180   -Outpatient BP goal <130/80, with tighter control if tolerated  -Recommend home blood pressure monitoring twice daily in AM and PM, keep log and bring to medical visits    #Hyperlipidemia  -Recommend starting Lipitor 40 mg daily  -Recommend LDL goal 40-70, <40 increases risk of intracranial hemorrhage  -Recommend Mediterranean diet       Discussed with vascular neurology attending, Dr. Villagomez      Patient Follow-up    - in the next 1 week(s) with PCP  - in 6-8 weeks with general neurology or stroke MARY ([phone]) (ordered)  -MRV pelvis within the next few days outpatient (ordered)  -DAYO within 1 week (not ordered)  -Based on results from hypercoagulable workup, cardiac monitor, and DAYO will determine if needs outpatient referral to cardiology for PFO closure    Thank you for this consult. We will follow peripherally for results of BLE US, if no DVT then we will sign off. Please contact us with any questions.      Verona Monzon PA-C  Vascular Neurology    To  "page me or covering stroke neurology team member, click here: AMCOM  Choose \"On Call\" tab at top, then select \"NEUROLOGY/ALL SITES\" from middle drop-down box, press Enter, then look for \"stroke\" or \"telestroke\" for your site.  _____________________________________________________    Clinically Significant Risk Factors Present on Admission                             # Overweight: Estimated body mass index is 28.66 kg/m  as calculated from the following:    Height as of this encounter: 1.791 m (5' 10.5\").    Weight as of this encounter: 91.9 kg (202 lb 9.6 oz).              Past Medical History    Past Medical History:   Diagnosis Date    CVA (cerebral vascular accident) (H) 12/5/2024    Idiopathic chronic gout of right foot without tophus      Medications   Home Meds  Prior to Admission medications    Medication Sig Start Date End Date Taking? Authorizing Provider   colchicine (COLCRYS) 0.6 MG tablet TAKE 1 TABLET(0.6 MG) BY MOUTH TWICE DAILY 2/23/24  Yes Restad, Toni CROWDER, DO   Pseudoeph-Doxylamine-DM-APAP (NYQUIL LIQUICAPS OR) Take 2 capsules by mouth as needed.   Yes Reported, Patient   ibuprofen (ADVIL,MOTRIN) 200 MG tablet Take 1 tablet by mouth every 4 hours as needed for mild pain.    Reported, Patient       Scheduled Meds  Current Facility-Administered Medications   Medication Dose Route Frequency Provider Last Rate Last Admin    sodium chloride (PF) 0.9% PF flush 3 mL  3 mL Intracatheter Q8H Sohail Ahmadi MD   3 mL at 12/06/24 0533       Infusion Meds  Current Facility-Administered Medications   Medication Dose Route Frequency Provider Last Rate Last Admin    Patient is already receiving mechanical prophylaxis   Does not apply Continuous PRN Jeff Balbuena PA-C           Allergies   Allergies   Allergen Reactions    Indomethacin Other (See Comments)     PN: HA          PHYSICAL EXAMINATION   Temp:  [97.8  F (36.6  C)-98.2  F (36.8  C)] 98.1  F (36.7  C)  Pulse:  [56-72] 63  Resp:  [16-18] " 16  BP: (136-157)/(75-92) 138/89  SpO2:  [93 %-97 %] 93 %    General Exam  General:  patient lying in bed without any acute distress    HEENT:  normocephalic/atraumatic  Pulmonary:  no respiratory distress      Neuro Exam  Mental Status:  alert, oriented x 3, follows commands, speech clear and fluent, naming and repetition normal  Cranial Nerves:  visual fields intact (tested by nurse), EOMI with normal smooth pursuit, facial sensation intact and symmetric (tested by nurse), facial movements symmetric, hearing not formally tested but intact to conversation, shoulder shrug equal bilaterally, tongue protrusion midline  Motor:  no abnormal movements, able to move all limbs antigravity spontaneously with no signs of hemiparesis observed, no pronator drift   Reflexes:  unable to test (telestroke)  Sensory:  light touch sensation intact and symmetric throughout upper and lower extremities (assessed by nurse), no extinction on double simultaneous stimulation (assessed by nurse)  Coordination:  normal finger-to-nose and heel-to-shin bilaterally without dysmetria  Station/Gait:  unable to test due to telestroke    Stroke Scales    NIHSS  1a. Level of Consciousness 0-->Alert, keenly responsive   1b. LOC Questions 0-->Answers both questions correctly   1c. LOC Commands 0-->Performs both tasks correctly   2.   Best Gaze 0-->Normal   3.   Visual 0-->No visual loss   4.   Facial Palsy 0-->Normal symmetrical movements   5a. Motor Arm, Left 0-->No drift, limb holds 90 (or 45) degrees for full 10 secs   5b. Motor Arm, Right 0-->No drift, limb holds 90 (or 45) degrees for full 10 secs   6a. Motor Leg, Left 0-->No drift, leg holds 30 degree position for full 5 secs   6b. Motor Leg, right 0-->No drift, leg holds 30 degree position for full 5 secs   7.   Limb Ataxia 0-->Absent   8.   Sensory 0-->Normal, no sensory loss   9.   Best Language 0-->No aphasia, normal   10. Dysarthria 0-->Normal   11. Extinction and Inattention  0-->No  "abnormality   Total 0 (12/06/24 0959)       Imaging  I personally reviewed all imaging; relevant findings per HPI.    Labs Data   CBC  No results for input(s): \"WBC\", \"RBC\", \"HGB\", \"HCT\", \"PLT\" in the last 168 hours.  Basic Metabolic Panel   Recent Labs   Lab 12/05/24  2227 12/05/24  1727   GLC 93 110*     Liver Panel  No results for input(s): \"PROTTOTAL\", \"ALBUMIN\", \"BILITOTAL\", \"ALKPHOS\", \"AST\", \"ALT\", \"BILIDIRECT\" in the last 168 hours.  INR    Recent Labs   Lab Test 12/05/24  1716   INR 1.04           Stroke Consult Data Data   Telestroke Service Details  (for non-emergent stroke consult with tele)  Video start time 12/06/24   0959   Video end time 12/06/24   1045   Type of service telemedicine diagnostic assessment of acute neurological changes   Reason telemedicine is appropriate patient requires assessment with a specialist for diagnosis and treatment of neurological symptoms   Mode of transmission secure interactive audio and video communication per Van   Originating site (patient location) [facility]    Distant site (provider location) [facility]       I have personally spent a total of 70 minutes providing care today, time spent in reviewing medical records and devising the plan as recorded above.     *All or a portion of this note was generated using voice recognition software and may contain transcription errors.    "

## 2024-12-06 NOTE — CONSULTS
Care Management Note:     CM entered the chart due to automatic referral for SDOH concerns.     After discussion with Charge RN, Jonny, VANESA learned the referral was result of SDOH questions being answered incorrectly and will be corrected by bedside RN.     No further care management intervention anticipated at this time.  Please re-consult if further needs arise.  Care management signing off.      MIGUEL Alford

## 2024-12-06 NOTE — PLAN OF CARE
"[facility] ADMISSION NOTE    Patient admitted to room 2213 at approximately 1700 via wheel chair from [facility]. Patient was accompanied by spouse.     Verbal SBAR report received from Therese LOGAN prior to patient arrival.     Patient ambulated to bed independently. Patient alert and oriented X 3. The patient is not having any pain.  . Admission vital signs: Blood pressure (!) 157/92, pulse 72, temperature 98.1  F (36.7  C), temperature source Oral, height 1.791 m (5' 10.5\"), weight 91.9 kg (202 lb 9.6 oz), SpO2 96%. Patient was oriented to plan of care, call light, bed controls, tv, telephone, bathroom, and visiting hours.     Risk Assessment    The following safety risks were identified during admission: fall. Yellow risk band applied: YES.     Skin Initial Assessment    This writer admitted this patient and completed a full skin assessment and Jr score in the Adult PCS flowsheet.   Photo documentation of skin problem and/or wound competed via [employer] application (located under Media):  N/A    Appropriate interventions initiated as needed.     Secondary skin check completed by MACEY, patient  dejon, did not have any skin concerns reported, deferred skin assessment at this time.    Jr Risk Assessment  Sensory Perception: 4-->no impairment  Moisture: 4-->rarely moist  Activity: 4-->walks frequently  Mobility: 4-->no limitation  Nutrition: 3-->adequate  Friction and Shear: 3-->no apparent problem  Jr Score: 22  Mattress: Standard gel/foam mattress (IsoFlex, Atmos Air, etc.)  Bed Frame: Standard width and length    Education    Patient has a [place] to Observation order: Yes  Observation education completed and documented: Yes  Stroke education provided to patient    Kiya Xavier RN                            "

## 2024-12-06 NOTE — PROGRESS NOTES
PRIMARY DIAGNOSIS:  CVA    OUTPATIENT/OBSERVATION GOALS TO BE MET BEFORE DISCHARGE:  ADLs back to baseline: Yes    Activity and level of assistance: Ambulating independently.    Pain status: Pain free.    Return to near baseline physical activity: Yes     Discharge Planner Nurse   Safe discharge environment identified: Yes  Barriers to discharge: Yes  Neuro consult and ECHO planned for later today.         Entered by: Amanda Quintanilla RN 12/06/2024 4:49 AM     Patient is alert and oriented x4, calm and cooperative with cares.  Patient denies pain.  Neuro assessments performed q 4 hours with no noted deficits.  Patient slept between cares overnight.

## 2024-12-06 NOTE — MEDICATION SCRIBE - ADMISSION MEDICATION HISTORY
Medication Scribe Admission Medication History    Admission medication history is complete. The information provided in this note is only as accurate as the sources available at the time of the update.    Information Source(s): Patient via phone    Pertinent Information:     Changes made to PTA medication list:  Added: nyquil   Deleted: None  Changed: None    Allergies reviewed with patient and updates made in EHR: yes    Medication History Completed By: Stefania Perez 12/5/2024 6:58 PM    PTA Med List   Medication Sig Last Dose/Taking    colchicine (COLCRYS) 0.6 MG tablet TAKE 1 TABLET(0.6 MG) BY MOUTH TWICE DAILY More than a month    Pseudoeph-Doxylamine-DM-APAP (NYQUIL LIQUICAPS OR) Take 2 capsules by mouth as needed. 12/4/2024 Bedtime

## 2024-12-07 ENCOUNTER — HOSPITAL ENCOUNTER (OUTPATIENT)
Dept: MRI IMAGING | Facility: CLINIC | Age: 44
Discharge: HOME | End: 2024-12-07
Attending: PHYSICIAN ASSISTANT | Admitting: PHYSICIAN ASSISTANT
Payer: COMMERCIAL

## 2024-12-07 DIAGNOSIS — I63.10 CEREBROVASCULAR ACCIDENT (CVA) DUE TO EMBOLISM OF PRECEREBRAL ARTERY (H): ICD-10-CM

## 2024-12-07 LAB
ATRIAL RATE - MUSE: 65 BPM
DIASTOLIC BLOOD PRESSURE - MUSE: NORMAL MMHG
INTERPRETATION ECG - MUSE: NORMAL
P AXIS - MUSE: 40 DEGREES
PR INTERVAL - MUSE: 168 MS
QRS DURATION - MUSE: 86 MS
QT - MUSE: 388 MS
QTC - MUSE: 403 MS
R AXIS - MUSE: 9 DEGREES
SYSTOLIC BLOOD PRESSURE - MUSE: NORMAL MMHG
T AXIS - MUSE: 15 DEGREES
VENTRICULAR RATE- MUSE: 65 BPM

## 2024-12-07 PROCEDURE — A9585 GADOBUTROL INJECTION: HCPCS | Performed by: PHYSICIAN ASSISTANT

## 2024-12-07 PROCEDURE — 255N000002 HC RX 255 OP 636: Performed by: PHYSICIAN ASSISTANT

## 2024-12-07 PROCEDURE — 258N000003 HC RX IP 258 OP 636: Performed by: PHYSICIAN ASSISTANT

## 2024-12-07 PROCEDURE — 72198 MR ANGIO PELVIS W/O & W/DYE: CPT

## 2024-12-07 RX ORDER — GADOBUTROL 604.72 MG/ML
10 INJECTION INTRAVENOUS ONCE
Status: COMPLETED | OUTPATIENT
Start: 2024-12-07 | End: 2024-12-07

## 2024-12-07 RX ADMIN — SODIUM CHLORIDE 50 ML: 9 INJECTION, SOLUTION INTRAVENOUS at 11:39

## 2024-12-07 RX ADMIN — GADOBUTROL 10 ML: 604.72 INJECTION INTRAVENOUS at 11:38

## 2024-12-08 ENCOUNTER — PATIENT OUTREACH (OUTPATIENT)
Dept: CARE COORDINATION | Facility: CLINIC | Age: 44
End: 2024-12-08
Payer: COMMERCIAL

## 2024-12-08 NOTE — PROGRESS NOTES
Connected Care Resource Center:   [facility] Resource Center Contact  [facility]/Voicemail     Clinical Data: Post-Discharge Outreach     Outreach attempted x 2.  Left message on patient's voicemail, providing [facility]'s central phone number of [phone] ([phone]) for questions/concerns and/or to schedule an appt with an [facility] provider, if they do not have a PCP.      Plan:  [facility] will do no further outreaches at this time.       Lizzy Zimmerman MA  Connected Care Resource Center, [facility]    *Connected Care Resource Team does NOT follow patient ongoing. Referrals are identified based on internal discharge reports and the outreach is to ensure patient has an understanding of their discharge instructions.

## 2024-12-09 LAB
AT III ACT/NOR PPP CHRO: 113 % (ref 85–135)
DRVVT CONFIRM NORMALIZED RATIO: 1.24
DRVVT SCREEN RATIO: 1.11
LA PPP-IMP: POSITIVE
LUPUS INTERPRETATION: ABNORMAL
PROT C ACT/NOR PPP CHRO: 93 % (ref 70–170)
PROT S FREE AG ACT/NOR PPP IA: 135 % (ref 70–148)
PTT RATIO: 1
THROMBIN TIME: 17.9 SECONDS (ref 13–19)

## 2024-12-10 LAB
B2 GLYCOPROT1 IGG SERPL IA-ACNC: 6 U/ML
B2 GLYCOPROT1 IGM SERPL IA-ACNC: <2.4 U/ML
CARDIOLIPIN IGG SER IA-ACNC: 7.6 GPL-U/ML
CARDIOLIPIN IGG SER IA-ACNC: NEGATIVE
CARDIOLIPIN IGM SER IA-ACNC: <2 MPL-U/ML
CARDIOLIPIN IGM SER IA-ACNC: NEGATIVE

## 2024-12-12 ENCOUNTER — HOSPITAL ENCOUNTER (OUTPATIENT)
Dept: CARDIOLOGY | Facility: HOSPITAL | Age: 44
Discharge: HOME | End: 2024-12-12
Attending: STUDENT IN AN ORGANIZED HEALTH CARE EDUCATION/TRAINING PROGRAM | Admitting: STUDENT IN AN ORGANIZED HEALTH CARE EDUCATION/TRAINING PROGRAM
Payer: COMMERCIAL

## 2024-12-12 VITALS
HEART RATE: 61 BPM | BODY MASS INDEX: 29.35 KG/M2 | DIASTOLIC BLOOD PRESSURE: 95 MMHG | RESPIRATION RATE: 18 BRPM | SYSTOLIC BLOOD PRESSURE: 145 MMHG | OXYGEN SATURATION: 97 % | WEIGHT: 205 LBS | HEIGHT: 70 IN | TEMPERATURE: 97.2 F

## 2024-12-12 DIAGNOSIS — I51.0 CARDIAC SEPTAL DEFECT, ACQUIRED: ICD-10-CM

## 2024-12-12 LAB — LVEF ECHO: NORMAL

## 2024-12-12 PROCEDURE — 99152 MOD SED SAME PHYS/QHP 5/>YRS: CPT | Performed by: STUDENT IN AN ORGANIZED HEALTH CARE EDUCATION/TRAINING PROGRAM

## 2024-12-12 PROCEDURE — 93325 DOPPLER ECHO COLOR FLOW MAPG: CPT

## 2024-12-12 PROCEDURE — 93320 DOPPLER ECHO COMPLETE: CPT | Mod: 26 | Performed by: STUDENT IN AN ORGANIZED HEALTH CARE EDUCATION/TRAINING PROGRAM

## 2024-12-12 PROCEDURE — 93312 ECHO TRANSESOPHAGEAL: CPT | Mod: 26 | Performed by: STUDENT IN AN ORGANIZED HEALTH CARE EDUCATION/TRAINING PROGRAM

## 2024-12-12 PROCEDURE — 93325 DOPPLER ECHO COLOR FLOW MAPG: CPT | Mod: 26 | Performed by: STUDENT IN AN ORGANIZED HEALTH CARE EDUCATION/TRAINING PROGRAM

## 2024-12-12 PROCEDURE — 250N000011 HC RX IP 250 OP 636: Performed by: STUDENT IN AN ORGANIZED HEALTH CARE EDUCATION/TRAINING PROGRAM

## 2024-12-12 PROCEDURE — 250N000009 HC RX 250: Performed by: STUDENT IN AN ORGANIZED HEALTH CARE EDUCATION/TRAINING PROGRAM

## 2024-12-12 RX ORDER — FENTANYL CITRATE 50 UG/ML
INJECTION, SOLUTION INTRAMUSCULAR; INTRAVENOUS
Status: COMPLETED | OUTPATIENT
Start: 2024-12-12 | End: 2024-12-12

## 2024-12-12 RX ORDER — LIDOCAINE HYDROCHLORIDE 20 MG/ML
SOLUTION OROPHARYNGEAL
Status: COMPLETED | OUTPATIENT
Start: 2024-12-12 | End: 2024-12-12

## 2024-12-12 RX ADMIN — MIDAZOLAM HYDROCHLORIDE 1 MG: 1 INJECTION, SOLUTION INTRAMUSCULAR; INTRAVENOUS at 08:18

## 2024-12-12 RX ADMIN — MIDAZOLAM HYDROCHLORIDE 2 MG: 1 INJECTION, SOLUTION INTRAMUSCULAR; INTRAVENOUS at 08:16

## 2024-12-12 RX ADMIN — FENTANYL CITRATE 50 MCG: 50 INJECTION, SOLUTION INTRAMUSCULAR; INTRAVENOUS at 08:14

## 2024-12-12 RX ADMIN — FENTANYL CITRATE 25 MCG: 50 INJECTION, SOLUTION INTRAMUSCULAR; INTRAVENOUS at 08:20

## 2024-12-12 RX ADMIN — TOPICAL ANESTHETIC 0.5 ML: 200 SPRAY DENTAL; PERIODONTAL at 08:13

## 2024-12-12 RX ADMIN — LIDOCAINE HYDROCHLORIDE 15 ML: 20 SOLUTION ORAL; TOPICAL at 08:11

## 2024-12-12 RX ADMIN — MIDAZOLAM HYDROCHLORIDE 1 MG: 1 INJECTION, SOLUTION INTRAMUSCULAR; INTRAVENOUS at 08:21

## 2024-12-12 RX ADMIN — FENTANYL CITRATE 25 MCG: 50 INJECTION, SOLUTION INTRAMUSCULAR; INTRAVENOUS at 08:17

## 2024-12-12 ASSESSMENT — ACTIVITIES OF DAILY LIVING (ADL)
ADLS_ACUITY_SCORE: 46

## 2024-12-12 NOTE — DISCHARGE INSTRUCTIONS
1. You are required to have someone accompany you home.    2. Rest today. Do not drive or operate machinery today. Over-activity may produce nausea and dizziness.    3. You should follow your normal diet. Drink plenty of fluids. Do not drink any alcoholic beverages for 24 hours. *(Alcohol may interact with the medications you received today).    4. NO HOT FOODS or LIQUIDS FOR 6 HOURS after the procedure.  2:30 PM today    5. You may have a sore throat or cough. This is normal. These symptoms should resolve in 24 hours.     6. If you have further questions call your doctor:      Sedation: Care Instructions  Overview     Sedation is the use of medicine to help you feel relaxed and comfortable during a procedure. The medicine is usually given in a vein (by I.V.). It may be used with numbing medicines.  There are different levels of sedation. They range from being awake but relaxed to being completely unconscious. Which level you have will depend on the procedure and your needs. You will be watched closely by a doctor or nurse during sedation.  Common side effects from sedation include:  Feeling sleepy or tired. (Your doctors and nurses will make sure you aren't too sleepy to go home.)  Feeling dizzy or unsteady.  Follow-up care is a key part of your treatment and safety. Be sure to make and go to all appointments, and call your doctor if you are having problems. It's also a good idea to know your test results and keep a list of the medicines you take.  How can you care for yourself at home?  Activity    Don't do anything that requires attention to detail until you recover. This includes going to work or school, making important decisions, and signing any legal documents. It takes time for the medicine effects to completely wear off.     For at least 24 hours, do not drive or operate any machinery.     When you get home, make sure to rest until the anesthesia has worn off. Some people will feel drowsy or dizzy for up to  "a few hours after leaving the hospital.     Take your time and walk slowly. Sudden changes in position may cause nausea.     Rest when you feel tired. Getting enough sleep will help you recover.     If you have sleep apnea and you have a CPAP machine, be sure to use it.   Diet    Don't drink alcohol for 24 hours.     You can eat your normal diet, unless your doctor gives you other instructions. If your stomach is upset, try clear liquids and bland, low-fat foods like plain toast or rice.     Drink plenty of fluids (unless your doctor tells you not to).   When should you call for help?   Call 911 anytime you think you may need emergency care. For example, call if:    You have trouble breathing.     You passed out (lost consciousness).   Call your doctor now or seek immediate medical care if:    You have nausea or vomiting that gets worse or won't stop.     You have a fever.     You have a new or worse headache.     The medicine is not wearing off and you can't think clearly.   Watch closely for changes in your health, and be sure to contact your doctor if:    You do not get better as expected.   Where can you learn more?  Go to https://www.[employer].net/patiented  Enter G817 in the search box to learn more about \"Sedation: Care Instructions.\"  Current as of: June 24, 2023  Content Version: 14.2 2024 [employer].   Care instructions adapted under license by your healthcare professional. If you have questions about a medical condition or this instruction, always ask your healthcare professional. [employer] disclaims any warranty or liability for your use of this information.    "

## 2024-12-13 PROBLEM — R76.0 LUPUS ANTICOAGULANT POSITIVE: Status: ACTIVE | Noted: 2024-12-13

## 2024-12-13 PROBLEM — E78.5 HYPERLIPIDEMIA LDL GOAL <70: Status: ACTIVE | Noted: 2019-02-15

## 2024-12-13 PROBLEM — Q21.12 PATENT FORAMEN OVALE WITH RIGHT TO LEFT SHUNT: Status: ACTIVE | Noted: 2024-12-12

## 2024-12-16 ENCOUNTER — TELEPHONE (OUTPATIENT)
Dept: GASTROENTEROLOGY | Facility: CLINIC | Age: 44
End: 2024-12-16
Payer: COMMERCIAL

## 2024-12-16 NOTE — TELEPHONE ENCOUNTER
Caller: Chino Samuel      Reason for Reschedule/Cancellation   (please be detailed, any staff messages or encounters to note?): pt had stroke last week, was instructed by PCP to delay colonoscopy 6 months      Prior to reschedule please review:  Ordering Provider: RESTAD  Sedation Determined: CS  Does patient have any ASC Exclusions, please identify?: YES, CASSIE      Notes on Cancelled Procedure:  Procedure: Lower Endoscopy [Colonoscopy]   Date: 1/21/25  Location: [facility] [address] [place] [address]   Surgeon: RUMA      Rescheduled: No, DEFERRING 6 MONTHS FROM CVA (12/9/24)       Did you cancel or rescheduled an EUS procedure? No.

## 2024-12-16 NOTE — TELEPHONE ENCOUNTER
Patient had stroke 12/5/25 - PCP advised 6 month delay - for SH or UPU ok for 6 month delay with conscious sedation or MAC. For RH - needs 9 month delay and MAC sedation anytime before 12/6/26.    Shital Blair RN  Endoscopy Procedure Pre Assessment RN  [phone] option 2

## 2024-12-23 ENCOUNTER — MYC MEDICAL ADVICE (OUTPATIENT)
Dept: FAMILY MEDICINE | Facility: CLINIC | Age: 44
End: 2024-12-23
Payer: COMMERCIAL

## 2024-12-26 NOTE — TELEPHONE ENCOUNTER
Form printed and placed in Dr. Freedman's mailbox at the .    Last OV 12/13/24. Please advise if visit is needed.

## 2024-12-29 ENCOUNTER — MYC MEDICAL ADVICE (OUTPATIENT)
Dept: FAMILY MEDICINE | Facility: CLINIC | Age: 44
End: 2024-12-29
Payer: COMMERCIAL

## 2024-12-29 DIAGNOSIS — I63.9 CEREBROVASCULAR ACCIDENT (CVA), UNSPECIFIED MECHANISM (H): ICD-10-CM

## 2024-12-30 ENCOUNTER — OFFICE VISIT (OUTPATIENT)
Dept: CARDIOLOGY | Facility: CLINIC | Age: 44
End: 2024-12-30
Attending: FAMILY MEDICINE
Payer: COMMERCIAL

## 2024-12-30 VITALS
HEIGHT: 70 IN | RESPIRATION RATE: 16 BRPM | WEIGHT: 209.9 LBS | DIASTOLIC BLOOD PRESSURE: 82 MMHG | OXYGEN SATURATION: 96 % | BODY MASS INDEX: 30.05 KG/M2 | HEART RATE: 67 BPM | SYSTOLIC BLOOD PRESSURE: 116 MMHG

## 2024-12-30 DIAGNOSIS — Q21.12 PATENT FORAMEN OVALE WITH RIGHT TO LEFT SHUNT: ICD-10-CM

## 2024-12-30 RX ORDER — ATORVASTATIN CALCIUM 40 MG/1
40 TABLET, FILM COATED ORAL EVERY EVENING
Qty: 90 TABLET | Refills: 3 | Status: SHIPPED | OUTPATIENT
Start: 2024-12-30

## 2024-12-30 NOTE — LETTER
12/30/2024    Toni Freedman, DO  [address]  [facility] [address]    RE: Chino Samuel       Dear Colleague,     I had the pleasure of seeing Chino Samuel in the [facility].  CARDIOLOGY CLINIC CONSULT    REASON FOR CONSULT: PFO    PRIMARY CARE PHYSICIAN:  Toni Freedman    HISTORY OF PRESENT ILLNESS:    45 yo M patient was admitted to [facility] 12/5/2024 to 12/6/2024 for acute stroke.  He is here today with his supportive wife, Genny.  History is pertinent for obstructive sleep apnea, dyslipidemia, hypertension, gout  He presented with balance issues, left fate facial droop and slurred speech.  He had recent air travel to [place] for work.  Lower extremity ultrasound and pelvic MRI was negative for DVT.  Fortunately he has had a good recovery with no residual effects except he occasionally feels dizzy but this symptom does predate the stroke.  He does not have chest pain dyspnea or palpitations.  He would like to start exercising again.  He usually uses a rowing machine 20 minutes a day prior to his stroke.    An MRI of her brain on 12/5/2024 showed small infarct in the left caudate tail and striatal capsular region which appeared subacute.    A transesophageal echo done on 12/12/2024 showed a PFO with right-to-left shunting.  I personally reviewed the DAYO and showed the images to the patient.    Lupus anticoagulant was positive on 12/6/2024.  Factor II and V mutation and Antithrombin III were negative.  Protein C&S levels were normal.      2-week Zio patch monitor was ordered and completed but has not yet been interpreted.    PAST MEDICAL HISTORY:  Past Medical History:   Diagnosis Date     CVA (cerebral vascular accident) (H) 12/5/2024     Idiopathic chronic gout of right foot without tophus        MEDICATIONS:  Current Outpatient Medications   Medication Sig Dispense Refill     allopurinol (ZYLOPRIM) 100 MG tablet Take 1 tablet (100 mg) by mouth daily. 90  tablet 3     aspirin 81 MG EC tablet Take 1 tablet (81 mg) by mouth daily. 30 tablet 0     atorvastatin (LIPITOR) 40 MG tablet Take 1 tablet (40 mg) by mouth every evening. 90 tablet 3     colchicine (COLCRYS) 0.6 MG tablet TAKE 1 TABLET(0.6 MG) BY MOUTH TWICE DAILY 60 tablet 3     ibuprofen (ADVIL,MOTRIN) 200 MG tablet Take 1 tablet by mouth every 4 hours as needed for mild pain.       ondansetron (ZOFRAN ODT) 8 MG ODT tab Take 8 mg by mouth.       No current facility-administered medications for this visit.       ALLERGIES:  Allergies   Allergen Reactions     Indomethacin Other (See Comments)     PN: HA       SOCIAL HISTORY:  I have reviewed this patient's social history and updated it with pertinent information if needed. Chino Samuel  reports that he quit smoking about 24 years ago. His smoking use included cigarettes. He started smoking about 28 years ago. He has a 1.8 pack-year smoking history. He has been exposed to tobacco smoke. He has never used smokeless tobacco. He reports current alcohol use. He reports that he does not use drugs. No alcohol since stroke 1-2 drinks / week.     FAMILY HISTORY:  Father has DM, cancer, h/o stroke  Paternal grandmother and maternal grandmother had strokes      REVIEW OF SYSTEMS:  Skin:        Eyes:       ENT:       Respiratory:  Negative for shortness of breath, dyspnea on exertion  Cardiovascular:    Positive for, lightheadedness, dizziness  Gastroenterology:      Genitourinary:       Musculoskeletal:       Neurologic:       Psychiatric:       Heme/Lymph/Imm:       Endocrine:         PHYSICAL EXAM:      BP: 116/82 Pulse: 67   Resp: 16 SpO2: 96 %      Vital Signs with Ranges  Pulse:  [67] 67  Resp:  [16] 16  BP: (116)/(82) 116/82  SpO2:  [96 %] 96 %  209 lbs 14.4 oz    Constitutional: awake, alert, no distress  Eyes: sclera nonicteric  ENT: trachea midline  Respiratory: clear bilaterally  Cardiovascular: regular rate and rhythm no murmur  GI: nondistended, nontender, bowel  sounds present  Skin: dry, no rash no edema  Musculoskeletal: grossly normal muscle bulk and tone  Neuropsychiatric: appropriate affect      DATA:   The following labs were reviewed today:    LAST CHOLESTEROL:  Lab Results   Component Value Date    CHOL 216 12/05/2024    CHOL 217 08/13/2020     Lab Results   Component Value Date    HDL 32 12/05/2024    HDL 37 08/13/2020     Lab Results   Component Value Date     12/05/2024     08/13/2020     Lab Results   Component Value Date    TRIG 180 12/05/2024    TRIG 244 08/13/2020     Lab Results   Component Value Date    CHOLHDLRATIO 5.7 05/26/2015       LAST BMP:  Lab Results   Component Value Date     10/01/2024     08/13/2020      Lab Results   Component Value Date    POTASSIUM 4.2 10/01/2024    POTASSIUM 3.9 08/25/2021    POTASSIUM 4.3 08/13/2020     Lab Results   Component Value Date    CHLORIDE 102 10/01/2024    CHLORIDE 103 08/25/2021    CHLORIDE 103 08/13/2020     Lab Results   Component Value Date    JACK 9.1 10/01/2024    JACK 9.3 08/13/2020     Lab Results   Component Value Date    CO2 24 10/01/2024    CO2 26 08/25/2021    CO2 31 08/13/2020     Lab Results   Component Value Date    BUN 10.6 10/01/2024    BUN 13 08/25/2021    BUN 13 08/13/2020     Lab Results   Component Value Date    CR 1.13 10/01/2024    CR 1.14 08/13/2020     Lab Results   Component Value Date     12/06/2024    GLC 84 08/25/2021    GLC 89 08/13/2020       LAST CBC:  Lab Results   Component Value Date    WBC 6.8 02/15/2019     Lab Results   Component Value Date    RBC 5.20 02/15/2019     Lab Results   Component Value Date    HGB 15.9 02/15/2019     Lab Results   Component Value Date    HCT 46.5 02/15/2019     Lab Results   Component Value Date    MCV 89 02/15/2019     Lab Results   Component Value Date    MCH 30.6 02/15/2019     Lab Results   Component Value Date    MCHC 34.2 02/15/2019     Lab Results   Component Value Date    RDW 12.3 02/15/2019     Lab Results  "  Component Value Date     02/15/2019         EKG 5/12/24: normal sinus rhythm     Echo12/6/24 Echo result w/o MOPS: Interpretation Summary 1. The left ventricle is normal in size. There is mild concentric leftventricular hypertrophy. Left ventricular systolic function is normal. Thevisual ejection fraction is 55-60%. No regional wall motion abnormalitiesnoted.2. The right ventricle is normal size. The right ventricular systolic functionis normal.3. Normal left atrial size. Right atrial size is normal. There is no colorDoppler evidence of an atrial shunt. A contrast injection (Bubble Study) wasperformed that was mildly positive for flow across the interatrial septum.4. Trace mitral and tricuspid regurgitation.5. The aortic Sinus(es) of Valsalva are borderline dilated.6. No pericardial effusion.7. No previous study for comparison.        ASSESSMENT:  Subacute ischemic stroke 12/6/24  Patent foramen ovale RoPE score is 7-8. CORIE classification is \"possible\" due to small to medium shunt, high RoPE score.   Dyslipidemia now on a statin  Positive lupus anticoagulant  Zio patch monitor is pending.  Obstructive sleep apnea - started CPAP in March 2024  Very distant history of tobacco use  He is not hypertensive though has been noted to have HTN.     RECOMMENDATIONS:  We will follow-up the results of his Zio patch monitor and recommendations from his follow-up visits with hematology and neurology.  Consider PFO closure if there is no indication for long-term anticoagulation.  Follow-up with me in 2 to 3 months.    Kait Leroy MD [facility]  Text Page         Thank you for allowing me to participate in the care of your patient.      Sincerely,     Kait Leroy MD     [facility]  cc:   Toni Freedman, DO  [address] [place] [address]      "

## 2024-12-30 NOTE — PROGRESS NOTES
CARDIOLOGY CLINIC CONSULT    REASON FOR CONSULT: PFO    PRIMARY CARE PHYSICIAN:  Toni Freedman    HISTORY OF PRESENT ILLNESS:    45 yo M patient was admitted to [facility] 12/5/2024 to 12/6/2024 for acute stroke.  He is here today with his supportive wife, Genny.  History is pertinent for obstructive sleep apnea, dyslipidemia, hypertension, gout  He presented with balance issues, left fate facial droop and slurred speech.  He had recent air travel to [place] for work.  Lower extremity ultrasound and pelvic MRI was negative for DVT.  Fortunately he has had a good recovery with no residual effects except he occasionally feels dizzy but this symptom does predate the stroke.  He does not have chest pain dyspnea or palpitations.  He would like to start exercising again.  He usually uses a rowing machine 20 minutes a day prior to his stroke.    An MRI of her brain on 12/5/2024 showed small infarct in the left caudate tail and striatal capsular region which appeared subacute.    A transesophageal echo done on 12/12/2024 showed a PFO with right-to-left shunting.  I personally reviewed the DAYO and showed the images to the patient.    Lupus anticoagulant was positive on 12/6/2024.  Factor II and V mutation and Antithrombin III were negative.  Protein C&S levels were normal.      2-week Zio patch monitor was ordered and completed but has not yet been interpreted.    PAST MEDICAL HISTORY:  Past Medical History:   Diagnosis Date    CVA (cerebral vascular accident) (H) 12/5/2024    Idiopathic chronic gout of right foot without tophus        MEDICATIONS:  Current Outpatient Medications   Medication Sig Dispense Refill    allopurinol (ZYLOPRIM) 100 MG tablet Take 1 tablet (100 mg) by mouth daily. 90 tablet 3    aspirin 81 MG EC tablet Take 1 tablet (81 mg) by mouth daily. 30 tablet 0    atorvastatin (LIPITOR) 40 MG tablet Take 1 tablet (40 mg) by mouth every evening. 90 tablet 3    colchicine (COLCRYS) 0.6 MG  tablet TAKE 1 TABLET(0.6 MG) BY MOUTH TWICE DAILY 60 tablet 3    ibuprofen (ADVIL,MOTRIN) 200 MG tablet Take 1 tablet by mouth every 4 hours as needed for mild pain.      ondansetron (ZOFRAN ODT) 8 MG ODT tab Take 8 mg by mouth.       No current facility-administered medications for this visit.       ALLERGIES:  Allergies   Allergen Reactions    Indomethacin Other (See Comments)     PN: HA       SOCIAL HISTORY:  I have reviewed this patient's social history and updated it with pertinent information if needed. Chino Samuel  reports that he quit smoking about 24 years ago. His smoking use included cigarettes. He started smoking about 28 years ago. He has a 1.8 pack-year smoking history. He has been exposed to tobacco smoke. He has never used smokeless tobacco. He reports current alcohol use. He reports that he does not use drugs. No alcohol since stroke 1-2 drinks / week.     FAMILY HISTORY:  Father has DM, cancer, h/o stroke  Paternal grandmother and maternal grandmother had strokes      REVIEW OF SYSTEMS:  Skin:        Eyes:       ENT:       Respiratory:  Negative for shortness of breath, dyspnea on exertion  Cardiovascular:    Positive for, lightheadedness, dizziness  Gastroenterology:      Genitourinary:       Musculoskeletal:       Neurologic:       Psychiatric:       Heme/Lymph/Imm:       Endocrine:         PHYSICAL EXAM:      BP: 116/82 Pulse: 67   Resp: 16 SpO2: 96 %      Vital Signs with Ranges  Pulse:  [67] 67  Resp:  [16] 16  BP: (116)/(82) 116/82  SpO2:  [96 %] 96 %  209 lbs 14.4 oz    Constitutional: awake, alert, no distress  Eyes: sclera nonicteric  ENT: trachea midline  Respiratory: clear bilaterally  Cardiovascular: regular rate and rhythm no murmur  GI: nondistended, nontender, bowel sounds present  Skin: dry, no rash no edema  Musculoskeletal: grossly normal muscle bulk and tone  Neuropsychiatric: appropriate affect      DATA:   The following labs were reviewed today:    LAST CHOLESTEROL:  Lab  Results   Component Value Date    CHOL 216 12/05/2024    CHOL 217 08/13/2020     Lab Results   Component Value Date    HDL 32 12/05/2024    HDL 37 08/13/2020     Lab Results   Component Value Date     12/05/2024     08/13/2020     Lab Results   Component Value Date    TRIG 180 12/05/2024    TRIG 244 08/13/2020     Lab Results   Component Value Date    CHOLHDLRATIO 5.7 05/26/2015       LAST BMP:  Lab Results   Component Value Date     10/01/2024     08/13/2020      Lab Results   Component Value Date    POTASSIUM 4.2 10/01/2024    POTASSIUM 3.9 08/25/2021    POTASSIUM 4.3 08/13/2020     Lab Results   Component Value Date    CHLORIDE 102 10/01/2024    CHLORIDE 103 08/25/2021    CHLORIDE 103 08/13/2020     Lab Results   Component Value Date    JACK 9.1 10/01/2024    JACK 9.3 08/13/2020     Lab Results   Component Value Date    CO2 24 10/01/2024    CO2 26 08/25/2021    CO2 31 08/13/2020     Lab Results   Component Value Date    BUN 10.6 10/01/2024    BUN 13 08/25/2021    BUN 13 08/13/2020     Lab Results   Component Value Date    CR 1.13 10/01/2024    CR 1.14 08/13/2020     Lab Results   Component Value Date     12/06/2024    GLC 84 08/25/2021    GLC 89 08/13/2020       LAST CBC:  Lab Results   Component Value Date    WBC 6.8 02/15/2019     Lab Results   Component Value Date    RBC 5.20 02/15/2019     Lab Results   Component Value Date    HGB 15.9 02/15/2019     Lab Results   Component Value Date    HCT 46.5 02/15/2019     Lab Results   Component Value Date    MCV 89 02/15/2019     Lab Results   Component Value Date    MCH 30.6 02/15/2019     Lab Results   Component Value Date    MCHC 34.2 02/15/2019     Lab Results   Component Value Date    RDW 12.3 02/15/2019     Lab Results   Component Value Date     02/15/2019         EKG 5/12/24: normal sinus rhythm     Echo12/6/24 Echo result w/o MOPS: Interpretation Summary 1. The left ventricle is normal in size. There is mild concentric  "leftventricular hypertrophy. Left ventricular systolic function is normal. Thevisual ejection fraction is 55-60%. No regional wall motion abnormalitiesnoted.2. The right ventricle is normal size. The right ventricular systolic functionis normal.3. Normal left atrial size. Right atrial size is normal. There is no colorDoppler evidence of an atrial shunt. A contrast injection (Bubble Study) wasperformed that was mildly positive for flow across the interatrial septum.4. Trace mitral and tricuspid regurgitation.5. The aortic Sinus(es) of Valsalva are borderline dilated.6. No pericardial effusion.7. No previous study for comparison.        ASSESSMENT:  Subacute ischemic stroke 12/6/24  Patent foramen ovale RoPE score is 7-8. CORIE classification is \"possible\" due to small to medium shunt, high RoPE score.   Dyslipidemia now on a statin  Positive lupus anticoagulant  Zio patch monitor is pending.  Obstructive sleep apnea - started CPAP in March 2024  Very distant history of tobacco use  He is not hypertensive though has been noted to have HTN.     RECOMMENDATIONS:  We will follow-up the results of his Zio patch monitor and recommendations from his follow-up visits with hematology and neurology.  Consider PFO closure if there is no indication for long-term anticoagulation.  Follow-up with me in 2 to 3 months.    Kait Leroy MD [facility]  Text Page       "

## 2024-12-30 NOTE — PATIENT INSTRUCTIONS
Follow up ziopatch monitor result. If no atrial fibrillation we will repeat a 2 week monitor for further screening.  Follow up as scheduled with hematology and neurology.   Schedule follow up with me in 2-3 months after seeing hematology and neurology. Virtual is ok.

## 2024-12-31 LAB — CV ZIO PRELIM RESULTS: NORMAL

## 2025-01-01 DIAGNOSIS — I63.9 CEREBROVASCULAR ACCIDENT (CVA), UNSPECIFIED MECHANISM (H): ICD-10-CM

## 2025-01-02 ENCOUNTER — ORDERS ONLY (AUTO-RELEASED) (OUTPATIENT)
Dept: CARDIOLOGY | Facility: CLINIC | Age: 45
End: 2025-01-02
Payer: COMMERCIAL

## 2025-01-02 ENCOUNTER — TELEPHONE (OUTPATIENT)
Dept: CARDIOLOGY | Facility: CLINIC | Age: 45
End: 2025-01-02
Payer: COMMERCIAL

## 2025-01-02 DIAGNOSIS — I51.0 CARDIAC SEPTAL DEFECT, ACQUIRED: ICD-10-CM

## 2025-01-02 DIAGNOSIS — Q21.12 PATENT FORAMEN OVALE WITH RIGHT TO LEFT SHUNT: ICD-10-CM

## 2025-01-02 DIAGNOSIS — Q21.12 PATENT FORAMEN OVALE WITH RIGHT TO LEFT SHUNT: Primary | ICD-10-CM

## 2025-01-02 RX ORDER — ATORVASTATIN CALCIUM 40 MG/1
40 TABLET, FILM COATED ORAL EVERY EVENING
Qty: 90 TABLET | Refills: 3 | OUTPATIENT
Start: 2025-01-02

## 2025-01-02 NOTE — TELEPHONE ENCOUNTER
"Yes please, a second 2 week monitor is indicated.  Kait Leroy MD on 1/2/2025 at 1:13 PM      ____________________________________________    To: Kait Leroy MD    Monitor showing Rare PACs, PVCs.  Diary entries for \"dizziness/lightheadedness\" correlated with sinus rhythm. No A fib.    Recommend 2 week zio?  ___________________________________________    To: [facility] Care Team Pool    Please forward his zio result when it's available.  If he has afib we won't close PFO, but if no afib he needs another 2 week monitor.    -Kait  "

## 2025-01-02 NOTE — TELEPHONE ENCOUNTER
Called and notified pt of results and recommendations. He verbalized an understanding.     New order for mail out zio placed per pt request.     He will follow up in clinic as planned on 2/25/25    Bridgett Tuttle RN

## 2025-01-06 NOTE — PROGRESS NOTES
[facility]  [facility]  [facility] [address], [address], [place] [address]  Main: [phone], Fax: [phone]      Outpatient Visit Note:    Patient: Chino Samuel  MRN: [id number]  : 1980  ALLYSON: 2025    Reason for Consultation:  Chino Samuel is a 44 year old male with a history of gout, hyperlipidemia, mild hypertension, and PFO that had a stroke in early 2024. He was referred to the [facility] for evaluation/recommendations surrounding whether or not he requires anticoagulation.    Pertinent Clinical History:  Chino presented on 24 with acute stroke. He reported symptoms of vertigo, left facial droop, and slurred speech. Symptoms started 3 days prior to presentation and were waxing and waning but not actively occurring at the time of his presentation. MRI showed left caudate tail and striatocapsular infarct.     Stroke work-up demonstrated:   NSR on EKG. Zio patch was negative. Has another one now.   No significant stenosis or LVO of the intracranial or cervical vasculature.   PFO with mild-moderate R to L shunting on DAYO. Also noted to have a moderately long  tract which may increase risk for embolus. No KVNG thrombus. Borderline concentric left  ventricular hypertrophy.    He had recently traveled to/from [place] with a return date of 24, granted he never had any lower extremity pain, edema, or erythema, nor did he have chest pain or dyspnea. He has no personal or family history of venous thromboembolism. His bilateral lower extremity US was negative for DVT. His inheritable thrombophilia work-up was negative. His lupus anticoagulant was positive x1 (on 24). He had negative beta-2-glycoprotein and cardiolipin ab.     Was ultimately started on aspirin and a statin. Not on anticoagulation.  Doing fine on antiplatelet therapy. No bleeding concerns.  Fortunately has no residual neuro  deficits.    Upcoming visits:  Neurology -- 2/7/25 with Teresa Enrique APRN CNP   Cardiology -- 2/25/25 with Kait Leroy MD.    Family History:  Chino's father had a stroke at 45 years old around the time when he had stage IV throat cancer.   Chino's maternal grandmother also had throat cancer and a TIA but she was in her 80s.    Denies any known family history of venous thromboembolism.    Exam via Televideo:  Constitutional: Appears well. Not in distress.   Respiratory: Breathing comfortably on room air.  Neuro: Aox3. No focal deficits noted.    Labs:  Factor V 1691G>A (Leiden)  RESULTS:  Mutation analyzed: 1691G>A  Factor V 1691G>A (Leiden)  Interpretation:  ABSENT  Factor V 1691G>A (Leiden) mutation  genotype:  G/G     FACTOR 2/PROTHROMBIN RESULTS:  Mutation analyzed: [id number]>A  Factor 2 Mutation Interpretation:  ABSENT  Factor 2 Mutation genotype:  G/G     Component      Latest Ref [facility] 12/6/2024  2:29 PM   Antithrombin III Chromogenic      85 - 135 % 113    Prot C Chromogenic      70 - 170 % 93    Protein S Antigen Free      70 - 148 % 135      Component      Latest Ref [facility] 12/5/2024  5:16 PM   Cardiolipin Sonja IgG Instrument Value      <10.0 GPL-U/mL 7.6    Cardiolipin IgG Sonja      Negative  Negative    Cardiolipin Sonja IgM Instrument Value      <10.0 MPL-U/mL <2.0    Cardiolipin IgM Sonja      Negative  Negative    Beta 2 Glycoprotein 1 Antibody IgG      <7.0 U/mL 6.0    Beta 2 Glycoprotein 1 Antibody IgM      <7.0 U/mL <2.4      Component      Latest Ref [facility] 12/6/2024  2:29 PM   Thrombin Time      13.0 - 19.0 Seconds 17.9    PTT Ratio      <1.30  1.00    DRVVT Screen Ratio      <1.08  1.11 (H)    DRVVT Confirm Normalized Ratio      <1.21  1.24 (H)    Lupus Result      Negative  Positive !          CBC and differential  Order: [id number]  Component  Ref Range & Units 4 wk ago   RBC  3.97 - 5.93 10*6/uL 5.46   Hemoglobin  12.3 - 16.9 g/dL 16.3   Hematocrit  35.0 - 50.0 % 47.2   MCV  77.3 - 95.6 fL 87.4    MCH  24.1 - 32.4 pg 30.2   MCHC  30.7 - 35.7 g/dL 34.5   RDW  11.5 - 16.0 % 11.6   MPV  9.0 - 12.9 fL 10   Neutrophils %  36.9 - 73.3 % 42.5   Lymphocytes %  14.5 - 50.2 % 42   Monocytes %  4.9 - 14.3 % 8.1   Eosinophils %  0.2 - 7.7 % 6.1   Basophils %  0.2 - 1.1 % 0.5   Neutrophils Abs Auto  1.50 - 8.40 10*3/uL 3.6   Lymphocytes Absolute  1.02 - 3.55 10*3/uL 3.57 Abnormal    Monocytes Absolute  0.26 - 1.07 10*3/uL 0.69   Eosinophils Absolute  0.02 - 0.57 10*3/uL 0.52   Basophils Absolute  0.01 - 0.08 10*3/uL 0.04   Absolute Immature Granulocyte  0 - 0.1 10*3/uL 0.07   Platelets  142 - 390 10*3/uL 222   Relative Immature Granulocyte  0.0 - 1.2 % 0.8   Auto WBC  3.60 - 11.82 10*3/uL 8.49   Resulting [facility]     ntains abnormal data Comprehensive metabolic panel  Order: [id number]  Component  Ref Range & Units 4 wk ago   Sodium  136 - 145 mmol/L 140   Potassium  3.4 - 5.1 mmol/L 3.6   Chloride  98 - 107 mmol/L 100   CO2  22.0 - 29.0 mmol/L 21.3 Abnormal    Anion Gap  6 - 20 mmol/L 22 Abnormal    BUN  6.0 - 20.0 mg/dL 14.2   Creatinine  0.67 - 1.17 mg/dL 1.09   Glucose  74 - 106 mg/dL 125 Abnormal    Calcium  8.6 - 10.0 mg/dL 9.5   AST  10 - 50 U/L 34   ALT (SGPT)  10 - 50 U/L 30   Alkaline Phosphatase  40 - 129 U/L 80   Total Protein  6.4 - 8.3 g/dL 7.4   Albumin  3.5 - 5.2 g/dL 4.5   Total Bilirubin  0.0 - 1.2 mg/dL 0.7   eGFR  >=59.0 mL/min/1.73m*2 84.8   A/G Ratio 1.55   Resulting [facility]     Imaging:  EXAM: US LOWER EXTREMITY VENOUS DUPLEX BILATERAL  LOCATION: [facility]  DATE: 12/6/2024  INDICATION: Stroke, patent foramen ovale, DVT  COMPARISON: None.  TECHNIQUE: Venous Duplex ultrasound of bilateral lower extremities with and without compression, augmentation and duplex. Color flow and spectral Doppler with waveform analysis performed.  FINDINGS: Exam includes the common femoral, femoral, popliteal veins as well as segmentally visualized  deep calf veins and greater saphenous vein.   RIGHT: No deep vein thrombosis. No superficial thrombophlebitis. No popliteal cyst.  LEFT: No deep vein thrombosis. No superficial thrombophlebitis. No popliteal cyst.                                                               IMPRESSION:  1.  No deep venous thrombosis in the bilateral lower extremities.    DAYO 12/12/24  Interpretation Summary  The left ventricle is normal in size. There is borderline concentric left  ventricular hypertrophy.  Left ventricular systolic function is normal. The visual ejection fraction is  60-65%.  The right ventricle is normal in size and function.  Normal left atrial size. Right atrial size is normal.  No thrombus is detected in the left atrial appendage.  There is a PFO with mild-moderate R to L shunting. There is a moderately long  tract which may increase risk for embolus.  Consider referral to structural heart clinic if no other source of CVA is  identified.    12/5/24 Procedure  Complete Portable Bubble Echo Adult  Interpretation Summary  1. The left ventricle is normal in size. There is mild concentric left  ventricular hypertrophy. Left ventricular systolic function is normal. The  visual ejection fraction is 55-60%. No regional wall motion abnormalities  noted.  2. The right ventricle is normal size. The right ventricular systolic function  is normal.  3. Normal left atrial size. Right atrial size is normal. There is no color  Doppler evidence of an atrial shunt. A contrast injection (Bubble Study) was  performed that was mildly positive for flow across the interatrial septum.  4. Trace mitral and tricuspid regurgitation.  5. The aortic Sinus(es) of Valsalva are borderline dilated.  6. No pericardial effusion.  7. No previous study for comparison.    Assessment:  In summary, Chino Samuel is a 44 year old male that had a cryptogenic stroke on 12/5/24. His stroke work-up did reveal a PFO with mild-moderate R to L shunting as  well as a moderately long tract (which may increase risk for embolus [arterial due to stasis in the tunneled portion]). He was noted to have traveled to/from [place] within weeks of his stroke, however, there was no identifiable thrombus on bilateral lower extremity US, nor did Chino experience any symptoms consistent with DVT. Chino has no personal or family history of venous thromboembolism and had a negative inheritable thrombophilia work-up. His lupus anticoagulant was positive x1 (on 12/6/24). He had negative beta-2-glycoprotein and cardiolipin ab.     We do need to repeat his lupus anticoagulant to be able to rule out antiphospholipid syndrome. We can repeat this today, in an effort to move up his timeline for possible procedures and to determine if she should be on anticoagulation in the interim. If negative today, we can rule out antiphospholipid syndrome and will not start anticoagulation. If still positive today, it is not diagnostic for antiphospholipid syndrome. We would need persistent positivity on or after 2/28/25 to diagnose antiphospholipid syndrome. We would start him on anticoagulation in the interim as a precaution.     In the event that Chino does ultimately have antiphospholipid syndrome, he will require indefinite anticoagulation, thus PFO closure may not be necessary (depending on cardiology opinion on this matter). However, if he does not have antiphospholipid syndrome, there is not a clear indication for anticoagulation, thus PFO closure should be considered (again, per cardiology).    Summary of Plan:   Stay on aspirin.   Lupus anticoagulant today.       If NEGATIVE TODAY:  No indication for anticoagulation at this time. Stay on aspirin. See cardiology for possible PFO closure.       If POSITIVE TODAY:  Will start Xarelto 20mg daily. Would need to repeat a lupus anticoagulant on or after 2/28 (with Xarelto held 3 days prior).       If still positive at that time, he would meet criteria for  antiphospholipid syndrome and would require indefinite anticoagulation.       If negative at that time, antiphospholipid syndrome ruled out, thus, no clear indication anticoagulation.       Neurology -- 2/7/25 with Teresa Enrique APRN CNP   Cardiology -- 2/25/25 with Kait Leroy MD.    70 minutes spent on the date of the encounter doing chart review, history and exam, documentation and further activities per the note.         Addendum   Lupus inhibitor appears to have been transient. Beta-2-glycoprotein and cardiolipin ab remain negative as well.  Does not have antiphospholipid syndrome.   No indication to start anticoagulation at this time. Continue aspirin and follow-up with cardiology to discuss PFO closure.  In the interim, contact us if doing any extensive travel or if major injuries/periods of immobilization occur. We could consider episodic anticoagulation in that case, at least until the PFO is closed. Results communicated to patient via [employer].      Olamide Patiño PA-C, [facility]  [facility]  [address] [place] [address], [address], [place] [id number]  Main: [phone], Fax: [phone]    Component      Latest Ref Rng 1/7/2025  12:54 PM   INR      0.85 - 1.15  1.03    Thrombin Time      13.0 - 19.0 Seconds 16.8    PTT Ratio      <1.30  1.03    DRVVT Screen Ratio      <1.08  0.92    Lupus Result      Negative  Negative            Component      Latest Ref Rng 1/7/2025  12:54 PM   Cardiolipin Sonja IgG Instrument Value      <10.0 GPL-U/mL 5.5    Cardiolipin IgG Sonja      Negative  Negative    Cardiolipin Sonja IgM Instrument Value      <10.0 MPL-U/mL <2.0    Cardiolipin IgM Sonja      Negative  Negative    Beta 2 Glycoprotein 1 Antibody IgG      <7.0 U/mL 6.4    Beta 2 Glycoprotein 1 Antibody IgM      <7.0 U/mL <2.4

## 2025-01-07 ENCOUNTER — OFFICE VISIT (OUTPATIENT)
Dept: HEMATOLOGY | Facility: CLINIC | Age: 45
End: 2025-01-07
Attending: FAMILY MEDICINE
Payer: COMMERCIAL

## 2025-01-07 VITALS
BODY MASS INDEX: 29.89 KG/M2 | WEIGHT: 208.3 LBS | OXYGEN SATURATION: 97 % | SYSTOLIC BLOOD PRESSURE: 134 MMHG | DIASTOLIC BLOOD PRESSURE: 81 MMHG | TEMPERATURE: 97.2 F | HEART RATE: 79 BPM

## 2025-01-07 DIAGNOSIS — R76.0 LUPUS ANTICOAGULANT POSITIVE: ICD-10-CM

## 2025-01-07 DIAGNOSIS — I63.9 CEREBROVASCULAR ACCIDENT (CVA), UNSPECIFIED MECHANISM (H): Primary | ICD-10-CM

## 2025-01-07 LAB
ALBUMIN SERPL BCG-MCNC: 4.8 G/DL (ref 3.5–5.2)
ALP SERPL-CCNC: 100 U/L (ref 40–150)
ALT SERPL W P-5'-P-CCNC: 57 U/L (ref 0–70)
ANION GAP SERPL CALCULATED.3IONS-SCNC: 14 MMOL/L (ref 7–15)
AST SERPL W P-5'-P-CCNC: 32 U/L (ref 0–45)
BILIRUB SERPL-MCNC: 0.8 MG/DL
BUN SERPL-MCNC: 12.9 MG/DL (ref 6–20)
CALCIUM SERPL-MCNC: 9.7 MG/DL (ref 8.8–10.4)
CHLORIDE SERPL-SCNC: 103 MMOL/L (ref 98–107)
CREAT SERPL-MCNC: 1.12 MG/DL (ref 0.67–1.17)
EGFRCR SERPLBLD CKD-EPI 2021: 82 ML/MIN/1.73M2
ERYTHROCYTE [DISTWIDTH] IN BLOOD BY AUTOMATED COUNT: 12.1 % (ref 10–15)
GLUCOSE SERPL-MCNC: 102 MG/DL (ref 70–99)
HCO3 SERPL-SCNC: 25 MMOL/L (ref 22–29)
HCT VFR BLD AUTO: 47.8 % (ref 40–53)
HGB BLD-MCNC: 16.8 G/DL (ref 13.3–17.7)
MCH RBC QN AUTO: 30.7 PG (ref 26.5–33)
MCHC RBC AUTO-ENTMCNC: 35.1 G/DL (ref 31.5–36.5)
MCV RBC AUTO: 87 FL (ref 78–100)
PLATELET # BLD AUTO: 226 10E3/UL (ref 150–450)
POTASSIUM SERPL-SCNC: 4.3 MMOL/L (ref 3.4–5.3)
PROT SERPL-MCNC: 7.7 G/DL (ref 6.4–8.3)
RBC # BLD AUTO: 5.47 10E6/UL (ref 4.4–5.9)
SODIUM SERPL-SCNC: 142 MMOL/L (ref 135–145)
WBC # BLD AUTO: 7.7 10E3/UL (ref 4–11)

## 2025-01-07 PROCEDURE — 82040 ASSAY OF SERUM ALBUMIN: CPT | Performed by: PHYSICIAN ASSISTANT

## 2025-01-07 PROCEDURE — 85014 HEMATOCRIT: CPT | Performed by: PHYSICIAN ASSISTANT

## 2025-01-07 PROCEDURE — 85730 THROMBOPLASTIN TIME PARTIAL: CPT | Performed by: PHYSICIAN ASSISTANT

## 2025-01-07 PROCEDURE — 86147 CARDIOLIPIN ANTIBODY EA IG: CPT | Performed by: PHYSICIAN ASSISTANT

## 2025-01-07 PROCEDURE — 82310 ASSAY OF CALCIUM: CPT | Performed by: PHYSICIAN ASSISTANT

## 2025-01-07 PROCEDURE — 85390 FIBRINOLYSINS SCREEN I&R: CPT | Mod: 26 | Performed by: PATHOLOGY

## 2025-01-07 PROCEDURE — 85613 RUSSELL VIPER VENOM DILUTED: CPT | Performed by: PHYSICIAN ASSISTANT

## 2025-01-07 PROCEDURE — 99213 OFFICE O/P EST LOW 20 MIN: CPT | Performed by: PHYSICIAN ASSISTANT

## 2025-01-07 PROCEDURE — 86146 BETA-2 GLYCOPROTEIN ANTIBODY: CPT | Performed by: PHYSICIAN ASSISTANT

## 2025-01-07 PROCEDURE — 36415 COLL VENOUS BLD VENIPUNCTURE: CPT | Performed by: PHYSICIAN ASSISTANT

## 2025-01-07 NOTE — LETTER
2025      RE: Chino Samuel  [address] [facility] [address]           [facility]  [facility]  [address], [address], [place] [address]  Main: [phone], Fax: [phone]      Outpatient Visit Note:    Patient: Chino Samuel  MRN: [id number]  : 1980  ALLYSON: 2025    Reason for Consultation:  Chino Samuel is a 44 year old male with a history of gout, hyperlipidemia, mild hypertension, and PFO that had a stroke in early 2024. He was referred to the [facility] for evaluation/recommendations surrounding whether or not he requires anticoagulation.    Pertinent Clinical History:  Chino presented on 24 with acute stroke. He reported symptoms of vertigo, left facial droop, and slurred speech. Symptoms started 3 days prior to presentation and were waxing and waning but not actively occurring at the time of his presentation. MRI showed left caudate tail and striatocapsular infarct.     Stroke work-up demonstrated:    NSR on EKG. Zio patch was negative. Has another one now.    No significant stenosis or LVO of the intracranial or cervical vasculature.    PFO with mild-moderate R to L shunting on DAYO. Also noted to have a moderately long  tract which may increase risk for embolus. No KVNG thrombus. Borderline concentric left  ventricular hypertrophy.    He had recently traveled to/from [place] with a return date of 24, granted he never had any lower extremity pain, edema, or erythema, nor did he have chest pain or dyspnea. He has no personal or family history of venous thromboembolism. His bilateral lower extremity US was negative for DVT. His inheritable thrombophilia work-up was negative. His lupus anticoagulant was positive x1 (on 24). He had negative beta-2-glycoprotein and cardiolipin ab.     Was ultimately started on aspirin and a statin. Not on anticoagulation.  Doing fine on  antiplatelet therapy. No bleeding concerns.  Fortunately has no residual neuro deficits.    Upcoming visits:  Neurology -- 2/7/25 with Teresa Enrique APRN CNP   Cardiology -- 2/25/25 with Kait Leroy MD.    Family History:  Chino's father had a stroke at 45 years old around the time when he had stage IV throat cancer.   Chino's maternal grandmother also had throat cancer and a TIA but she was in her 80s.    Denies any known family history of venous thromboembolism.    Exam via Televideo:  Constitutional: Appears well. Not in distress.   Respiratory: Breathing comfortably on room air.  Neuro: Aox3. No focal deficits noted.    Labs:  Factor V 1691G>A (Leiden)  RESULTS:  Mutation analyzed: 1691G>A  Factor V 1691G>A (Leiden)  Interpretation:  ABSENT  Factor V 1691G>A (Leiden) mutation  genotype:  G/G     FACTOR 2/PROTHROMBIN RESULTS:  Mutation analyzed: [id number]  Factor 2 Mutation Interpretation:  ABSENT  Factor 2 Mutation genotype:  G/G     Component      Latest Ref Rn 12/6/2024  2:29 PM   Antithrombin III Chromogenic      85 - 135 % 113    Prot C Chromogenic      70 - 170 % 93    Protein S Antigen Free      70 - 148 % 135      Component      Latest Ref [facility] 12/5/2024  5:16 PM   Cardiolipin Sonja IgG Instrument Value      <10.0 GPL-U/mL 7.6    Cardiolipin IgG Sonja      Negative  Negative    Cardiolipin Sonja IgM Instrument Value      <10.0 MPL-U/mL <2.0    Cardiolipin IgM Sonja      Negative  Negative    Beta 2 Glycoprotein 1 Antibody IgG      <7.0 U/mL 6.0    Beta 2 Glycoprotein 1 Antibody IgM      <7.0 U/mL <2.4      Component      Latest Ref Rn 12/6/2024  2:29 PM   Thrombin Time      13.0 - 19.0 Seconds 17.9    PTT Ratio      <1.30  1.00    DRVVT Screen Ratio      <1.08  1.11 (H)    DRVVT Confirm Normalized Ratio      <1.21  1.24 (H)    Lupus Result      Negative  Positive !          CBC and differential  Order: [id number]  Component  Ref Range & Units 4 wk ago   RBC  3.97 - 5.93 10*6/uL 5.46   Hemoglobin  12.3 -  16.9 g/dL 16.3   Hematocrit  35.0 - 50.0 % 47.2   MCV  77.3 - 95.6 fL 87.4   MCH  24.1 - 32.4 pg 30.2   MCHC  30.7 - 35.7 g/dL 34.5   RDW  11.5 - 16.0 % 11.6   MPV  9.0 - 12.9 fL 10   Neutrophils %  36.9 - 73.3 % 42.5   Lymphocytes %  14.5 - 50.2 % 42   Monocytes %  4.9 - 14.3 % 8.1   Eosinophils %  0.2 - 7.7 % 6.1   Basophils %  0.2 - 1.1 % 0.5   Neutrophils Abs Auto  1.50 - 8.40 10*3/uL 3.6   Lymphocytes Absolute  1.02 - 3.55 10*3/uL 3.57 Abnormal    Monocytes Absolute  0.26 - 1.07 10*3/uL 0.69   Eosinophils Absolute  0.02 - 0.57 10*3/uL 0.52   Basophils Absolute  0.01 - 0.08 10*3/uL 0.04   Absolute Immature Granulocyte  0 - 0.1 10*3/uL 0.07   Platelets  142 - 390 10*3/uL 222   Relative Immature Granulocyte  0.0 - 1.2 % 0.8   Auto WBC  3.60 - 11.82 10*3/uL 8.49   Resulting Agency [facility]     ntains abnormal data Comprehensive metabolic panel  Order: [id number]  Component  Ref Range & Units 4 wk ago   Sodium  136 - 145 mmol/L 140   Potassium  3.4 - 5.1 mmol/L 3.6   Chloride  98 - 107 mmol/L 100   CO2  22.0 - 29.0 mmol/L 21.3 Abnormal    Anion Gap  6 - 20 mmol/L 22 Abnormal    BUN  6.0 - 20.0 mg/dL 14.2   Creatinine  0.67 - 1.17 mg/dL 1.09   Glucose  74 - 106 mg/dL 125 Abnormal    Calcium  8.6 - 10.0 mg/dL 9.5   AST  10 - 50 U/L 34   ALT (SGPT)  10 - 50 U/L 30   Alkaline Phosphatase  40 - 129 U/L 80   Total Protein  6.4 - 8.3 g/dL 7.4   Albumin  3.5 - 5.2 g/dL 4.5   Total Bilirubin  0.0 - 1.2 mg/dL 0.7   eGFR  >=59.0 mL/min/1.73m*2 84.8   A/G Ratio 1.55   Resulting Agency [facility]     Imaging:  EXAM: US LOWER EXTREMITY VENOUS DUPLEX BILATERAL  LOCATION: [facility]  DATE: 12/6/2024  INDICATION: Stroke, patent foramen ovale, DVT  COMPARISON: None.  TECHNIQUE: Venous Duplex ultrasound of bilateral lower extremities with and without compression, augmentation and duplex. Color flow and spectral Doppler with waveform analysis performed.  FINDINGS: Exam includes the  common femoral, femoral, popliteal veins as well as segmentally visualized deep calf veins and greater saphenous vein.   RIGHT: No deep vein thrombosis. No superficial thrombophlebitis. No popliteal cyst.  LEFT: No deep vein thrombosis. No superficial thrombophlebitis. No popliteal cyst.                                                               IMPRESSION:  1.  No deep venous thrombosis in the bilateral lower extremities.    DAYO 12/12/24  Interpretation Summary  The left ventricle is normal in size. There is borderline concentric left  ventricular hypertrophy.  Left ventricular systolic function is normal. The visual ejection fraction is  60-65%.  The right ventricle is normal in size and function.  Normal left atrial size. Right atrial size is normal.  No thrombus is detected in the left atrial appendage.  There is a PFO with mild-moderate R to L shunting. There is a moderately long  tract which may increase risk for embolus.  Consider referral to structural heart clinic if no other source of CVA is  identified.    12/5/24 Procedure  Complete Portable Bubble Echo Adult  Interpretation Summary  1. The left ventricle is normal in size. There is mild concentric left  ventricular hypertrophy. Left ventricular systolic function is normal. The  visual ejection fraction is 55-60%. No regional wall motion abnormalities  noted.  2. The right ventricle is normal size. The right ventricular systolic function  is normal.  3. Normal left atrial size. Right atrial size is normal. There is no color  Doppler evidence of an atrial shunt. A contrast injection (Bubble Study) was  performed that was mildly positive for flow across the interatrial septum.  4. Trace mitral and tricuspid regurgitation.  5. The aortic Sinus(es) of Valsalva are borderline dilated.  6. No pericardial effusion.  7. No previous study for comparison.    Assessment:  In summary, Chino Samuel is a 44 year old male that had a cryptogenic stroke on 12/5/24.  His stroke work-up did reveal a PFO with mild-moderate R to L shunting as well as a moderately long tract (which may increase risk for embolus [arterial due to stasis in the tunneled portion]). He was noted to have traveled to/from [place] within weeks of his stroke, however, there was no identifiable thrombus on bilateral lower extremity US, nor did Chino experience any symptoms consistent with DVT. Chino has no personal or family history of venous thromboembolism and had a negative inheritable thrombophilia work-up. His lupus anticoagulant was positive x1 (on 12/6/24). He had negative beta-2-glycoprotein and cardiolipin ab.     We do need to repeat his lupus anticoagulant to be able to rule out antiphospholipid syndrome. We can repeat this today, in an effort to move up his timeline for possible procedures and to determine if she should be on anticoagulation in the interim. If negative today, we can rule out antiphospholipid syndrome and will not start anticoagulation. If still positive today, it is not diagnostic for antiphospholipid syndrome. We would need persistent positivity on or after 2/28/25 to diagnose antiphospholipid syndrome. We would start him on anticoagulation in the interim as a precaution.     In the event that Chino does ultimately have antiphospholipid syndrome, he will require indefinite anticoagulation, thus PFO closure may not be necessary (depending on cardiology opinion on this matter). However, if he does not have antiphospholipid syndrome, there is not a clear indication for anticoagulation, thus PFO closure should be considered (again, per cardiology).    Summary of Plan:    Stay on aspirin.    Lupus anticoagulant today.         If NEGATIVE TODAY:  No indication for anticoagulation at this time. Stay on aspirin. See cardiology for possible PFO closure.         If POSITIVE TODAY:  Will start Xarelto 20mg daily. Would need to repeat a lupus anticoagulant on or after 2/28 (with Xarelto held  3 days prior).          If still positive at that time, he would meet criteria for antiphospholipid syndrome and would require indefinite anticoagulation.          If negative at that time, antiphospholipid syndrome ruled out, thus, no clear indication anticoagulation.       Neurology -- 2/7/25 with Teresa Enrique APRN CNP   Cardiology -- 2/25/25 with Kait Leroy MD.    70 minutes spent on the date of the encounter doing chart review, history and exam, documentation and further activities per the note.           Olamide Patiño PA-C, [facility]  [facility]  [address] [place] [address], [address], [place] [address]  Main: [phone], Fax: [phone]                Olamide Patiño PA-C

## 2025-01-07 NOTE — PATIENT INSTRUCTIONS
"    [facility]  [facility]  [address], [address], [place] [address]  Main: [phone], Fax: [phone]    LABS TODAY.    If the lupus anticoagulant (or cardiolipin and beta-2-glycoprotein antibodies) are NEGATIVE, you do not have antiphospholipid syndrome and will not need to start anticoagulation (Eliquis or Xarelto). You would stay on aspirin and follow-up with cardiology to discuss PFO closure.       If the lupus anticoagulant (or cardiolipin and beta-2-glycoprotein antibodies) are positive today, we will start you on anticoagulation as a precaution (Xarelto or Eliquis), then plan to repeat the labs again on or after 2/28/25 to definitively find out if you have antiphospholipid syndrome. YOU WOULD NEED TO HOLD XARELTO FOR 3 DAYS PRIOR TO THE LAB DRAW FOR ACCURATE RESULT.    If you HAVE antiphospholipid syndrome, you will need long term anticoagulation (Xarelto or Eliquis)  If you do NOT have antiphospholipid syndrome, you will not have an indication for anticoagulation.       ANTIPHOSPHOLIPID ANTIBODIES  Antiphospholipid antibodies are a laboratory finding. They can be a transient or temporary finding, often following an infection/acute illness, though not always. Only when these antibodies are persistent (present on at least 2 separate occasions that 12 or more weeks apart) AND occur in association with thrombotic events or adverse pregnancy outcomes are they part of a clinical syndrome called antiphospholipid syndrome (APS).    ANTIPHOSPHOLIPID SYNDROME OVERVIEW  Phospholipids are molecules that are present in the membranes that form the surface of cells, including blood cells and the cells that line blood vessels. In some people, the immune system develops abnormal antibodies (proteins) called \"antiphospholipid antibodies\"; this can affect blood flow and increase the risk of developing blood clots in the veins or arteries and the risk of " "miscarriage or stillbirth among pregnant people. However, not everybody with these antibodies develop clots or have miscarriages. People who have antiphospholipid antibodies and develop blood clots or pregnancy-related complications are said to have a syndrome called the antiphospholipid syndrome (APS). APS is an autoimmune disorder, meaning that it occurs when the body's immune system mistakenly attacks healthy tissues and organs. APS is more common in people with other autoimmune or rheumatic diseases, particularly systemic lupus erythematosus (SLE). APS is referred to as \"primary\" when it occurs alone. Treatment for APS typically includes anticoagulation. Specific anticoagulants (ie warfarin, Xarelto, Eliquis) are selected based on the antibody profile and needs of each individual patient.                   "

## 2025-01-08 LAB
B2 GLYCOPROT1 IGG SERPL IA-ACNC: 6.4 U/ML
B2 GLYCOPROT1 IGM SERPL IA-ACNC: <2.4 U/ML
CARDIOLIPIN IGG SER IA-ACNC: 5.5 GPL-U/ML
CARDIOLIPIN IGG SER IA-ACNC: NEGATIVE
CARDIOLIPIN IGM SER IA-ACNC: <2 MPL-U/ML
CARDIOLIPIN IGM SER IA-ACNC: NEGATIVE
DRVVT SCREEN RATIO: 0.92
INR PPP: 1.03 (ref 0.85–1.15)
LA PPP-IMP: NEGATIVE
LUPUS INTERPRETATION: NORMAL
PTT RATIO: 1.03
THROMBIN TIME: 16.8 SECONDS (ref 13–19)

## 2025-01-09 DIAGNOSIS — Q21.12 PFO (PATENT FORAMEN OVALE): Primary | ICD-10-CM

## 2025-01-10 ENCOUNTER — MYC MEDICAL ADVICE (OUTPATIENT)
Dept: FAMILY MEDICINE | Facility: CLINIC | Age: 45
End: 2025-01-10
Payer: COMMERCIAL

## 2025-01-13 DIAGNOSIS — Q21.12 PFO (PATENT FORAMEN OVALE): Primary | ICD-10-CM

## 2025-01-13 DIAGNOSIS — Q21.12 PATENT FORAMEN OVALE WITH RIGHT TO LEFT SHUNT: ICD-10-CM

## 2025-01-14 ENCOUNTER — TELEPHONE (OUTPATIENT)
Dept: CARDIOLOGY | Facility: CLINIC | Age: 45
End: 2025-01-14
Payer: COMMERCIAL

## 2025-01-14 NOTE — TELEPHONE ENCOUNTER
Called to schedule 1 mo post op PFO Echo and OV     LVM and callback information    [phone]    Linda Valdez  Structural Heart Procedure   [facility]

## 2025-01-28 ENCOUNTER — MYC MEDICAL ADVICE (OUTPATIENT)
Dept: FAMILY MEDICINE | Facility: CLINIC | Age: 45
End: 2025-01-28
Payer: COMMERCIAL

## 2025-01-30 ENCOUNTER — MYC MEDICAL ADVICE (OUTPATIENT)
Dept: FAMILY MEDICINE | Facility: CLINIC | Age: 45
End: 2025-01-30
Payer: COMMERCIAL

## 2025-01-30 NOTE — LETTER
January 30, 2025      Chino Samuel  [address] [facility] [address]        To Whom It May Concern,     Chino Samuel is a patient in our clinic and I am his primary physician. He unfortunately developed a significant medical condition that was unforeseen and resulted in temporarily not being able to complete activities of daily living on his own. This required his spouse to be present so Chino could attend appropriate treatments as well as food prep. This was an unforeseen medical emergency. As such, Lily Samuel could not travel as originally planned on 2/17/2025 through 2/23/25.     Please contact our clinic if you have any questions.    Sincerely,    Electronically signed    Toni Freedman, DO

## 2025-02-17 ENCOUNTER — MYC MEDICAL ADVICE (OUTPATIENT)
Dept: FAMILY MEDICINE | Facility: CLINIC | Age: 45
End: 2025-02-17
Payer: COMMERCIAL

## 2025-02-17 NOTE — TELEPHONE ENCOUNTER
Forms/Letter Request    Type of form/letter: OTHER: Travel    Do we have the form/letter: Yes: printed and placed in Dr. Freedman's mailbox at     Who is the form from? Patient    Where did/will the form come from? form was sent via [email or web address]    When is form/letter needed by: As Soon As Possible    How would you like the form/letter returned: [email or web address]    Patient Notified form requests are processed in 5-7 business days:Yes    Could we send this information to you in [email or web address] or would you prefer to receive a phone call?:   Patient would like to be contacted via [email or web address]

## 2025-02-19 ENCOUNTER — MYC MEDICAL ADVICE (OUTPATIENT)
Dept: CARDIOLOGY | Facility: CLINIC | Age: 45
End: 2025-02-19
Payer: COMMERCIAL

## 2025-02-19 ENCOUNTER — MYC MEDICAL ADVICE (OUTPATIENT)
Dept: FAMILY MEDICINE | Facility: CLINIC | Age: 45
End: 2025-02-19
Payer: COMMERCIAL

## 2025-02-19 DIAGNOSIS — Q21.12 PATENT FORAMEN OVALE WITH RIGHT TO LEFT SHUNT: ICD-10-CM

## 2025-02-19 DIAGNOSIS — Q21.12 PFO (PATENT FORAMEN OVALE): Primary | ICD-10-CM

## 2025-02-19 DIAGNOSIS — H81.13 BENIGN PAROXYSMAL POSITIONAL VERTIGO DUE TO BILATERAL VESTIBULAR DISORDER: Primary | ICD-10-CM

## 2025-02-19 DIAGNOSIS — I51.0 CARDIAC SEPTAL DEFECT, ACQUIRED: ICD-10-CM

## 2025-02-19 RX ORDER — CEFAZOLIN SODIUM 2 G/100ML
2 INJECTION, SOLUTION INTRAVENOUS
OUTPATIENT
Start: 2025-02-19

## 2025-02-19 RX ORDER — ASPIRIN 81 MG/1
81 TABLET, CHEWABLE ORAL ONCE
OUTPATIENT
Start: 2025-02-19 | End: 2025-02-19

## 2025-02-19 RX ORDER — LIDOCAINE 40 MG/G
CREAM TOPICAL
OUTPATIENT
Start: 2025-02-19

## 2025-02-19 RX ORDER — CLOPIDOGREL BISULFATE 75 MG/1
75 TABLET ORAL DAILY
Qty: 90 TABLET | Refills: 1 | Status: SHIPPED | OUTPATIENT
Start: 2025-02-19

## 2025-02-19 RX ORDER — CLOPIDOGREL BISULFATE 75 MG/1
75 TABLET ORAL ONCE
OUTPATIENT
Start: 2025-02-19 | End: 2025-02-19

## 2025-02-19 NOTE — TELEPHONE ENCOUNTER
Form completed. Error in attaching PDF of form to [employer] message.    Form is with Tanisha, awaiting patient response.

## 2025-02-20 ENCOUNTER — HOSPITAL ENCOUNTER (OUTPATIENT)
Facility: CLINIC | Age: 45
End: 2025-02-20
Attending: INTERNAL MEDICINE | Admitting: INTERNAL MEDICINE
Payer: COMMERCIAL

## 2025-02-24 PROBLEM — H81.13 BENIGN PAROXYSMAL POSITIONAL VERTIGO DUE TO BILATERAL VESTIBULAR DISORDER: Status: ACTIVE | Noted: 2025-02-24

## 2025-02-25 ENCOUNTER — OFFICE VISIT (OUTPATIENT)
Dept: CARDIOLOGY | Facility: CLINIC | Age: 45
End: 2025-02-25
Attending: INTERNAL MEDICINE
Payer: COMMERCIAL

## 2025-02-25 VITALS
HEART RATE: 69 BPM | HEIGHT: 70 IN | DIASTOLIC BLOOD PRESSURE: 85 MMHG | BODY MASS INDEX: 30.35 KG/M2 | SYSTOLIC BLOOD PRESSURE: 134 MMHG | WEIGHT: 212 LBS

## 2025-02-25 DIAGNOSIS — Q21.12 PATENT FORAMEN OVALE WITH RIGHT TO LEFT SHUNT: ICD-10-CM

## 2025-02-25 PROCEDURE — G2211 COMPLEX E/M VISIT ADD ON: HCPCS | Performed by: INTERNAL MEDICINE

## 2025-02-25 PROCEDURE — 3075F SYST BP GE 130 - 139MM HG: CPT | Performed by: INTERNAL MEDICINE

## 2025-02-25 PROCEDURE — 99214 OFFICE O/P EST MOD 30 MIN: CPT | Performed by: INTERNAL MEDICINE

## 2025-02-25 PROCEDURE — 3079F DIAST BP 80-89 MM HG: CPT | Performed by: INTERNAL MEDICINE

## 2025-02-25 NOTE — PATIENT INSTRUCTIONS
"INSTRUCTIONS FOR PREPARING FOR ASD/PFO CLOSURE:  Report to [facility], [facility] at 7:30am  If you go home the same day, you will need a  to take you home. You will have an echocardiogram (ultrasound of your heart) prior to being discharged.  Nothing to eat or drink after midnight the night before the procedure.    Medication instructions:  PFO Plavix Instructions: We will start you on a medication called Plavix. For the day before your procedure ONLY, please take 300 mg (4 tabs). This is a \"loading dose\". Then, beginning the day of your procedure, take 75 mg (1 tab) every day. Please stay on the Plavix for 6 full months post-procedure, then stop.  Continue taking a baby aspirin (81 mg) as ordered for at least 6 months post-procedure. Please DO NOT stop aspirin without your cardiologist's approval.   Please hold vitamins and supplements the day of the procedure    Other instructions:  Notify your providers immediately of ANY hospitalization, changes in health, or new medications prior to procedure date    DISCHARGE INSTRUCTIONS FOR PFO/ASD CLOSURE:    After You Go Home:  Have an adult stay with you until tomorrow.  You may eat your normal diet, unless your doctor tells you otherwise.  Increase fluid intake for two days.  If your heart feels like it is \"fluttering\" contact your cardiologist immediately    Care of Puncture Site:  Check your groin site for the first 48 hours for new bleeding, bruising, drainage, redness, warmth or swelling.  If you develop these symptoms, call the RN coordinator.   Should bleeding occur, lie down on a firm surface and hold pressure over the puncture site for 10 minutes. Lay flat for another 1-2 hours. If bleeding does not stop, call 911.  It is normal to have a small bruise or pea size lump at the site.  OK to use ice packs at groin sites 20 minutes at a time for groin discomfort  You may shower. Do NOT " take a bath, or use a hot tub or pool until groin site heals, which may take up to a week.    Do NOT scrub the site. Do not use lotion or powder near the puncture site until site is fully healed.    Medication Instructions:  Take aspirin 81mg with food daily. DO NOT discontinue taking aspirin without your cardiologist's approval.  Take Plavix 75mg with food daily for 6 months.  Inform all your physicians and dentist that you will need antibiotic prophylaxis prior to any procedure for 6 months after your closure device was implanted to prevent infection.    Activity restrictions:  No lifting greater that 10 pounds for 1 week after procedure.  No lifting greater than 20-30 pounds for 2-3 weeks after the procedure.  Avoid heavy aerobic activity for 4 weeks (walking and stair climbing is okay).  Avoid biking, motorcycle riding, 4 wheelers, boating and jet skiing for 6 weeks.  Avoid contact sports for 3 months such as: scuba diving, skydiving, hockey, basketball, volleyball, football, baseball, softball, soccer, lacrosse, hunting with rifle.    Follow up Care:  You will follow-up in 1 month with an echocardiogram (ultrasound of your heart) to check the device.  You will follow-up in 6 months with cardiologist with an echocardiogram.   You will follow-up in 1 year with with an echocardiogram.     Call DOREEN King at [phone] (Mon-Fri 8:00-4:00) or for after hours needs call [phone] if:   - You have increased pain or a large or growing hard lump around the site.  - The site is red, swollen, hot or tender.  - Blood or fluid is draining from the site.  - You have chills or a fever greater than 101 F (38 C).  - Your leg feels numb, cool or changes color.  - If chest or groin pain is not relieved by Advil or Tylenol.  - Any questions or concerns.

## 2025-02-25 NOTE — PROGRESS NOTES
[facility] [place] [facility] Progress note    Assessment:  Patent foramen ovale  History of CVA, left caudate tail and striatal capsular subacute ischemic infarcts December 2024  Recent emergency department evaluation for vertigo 2/18/2025  Dyslipidemia  Obstructive sleep apnea, uses CPAP    Plan:  Percutaneous patent foramen ovale closure with intracardiac echo and anticipated closure with [place] cardio form septal occluder device scheduled on 2/27/2025.  Pre and postprocedure instructions were reviewed with the patient.    Take clopidogrel 300 mg p.o. the evening before the procedure followed by 75 mg p.o. daily.    The longitudinal plan of care for the diagnosis(es)/condition(s) as documented were addressed during this visit. Due to the added complexity in care, I will continue to support Chino in the subsequent management and with ongoing continuity of care.      HPI:     44-year-old male patient with history of stroke in December 2024 presenting with balance issues and vertigo, left facial droop and slurred speech.  With PFO on DAYO is here for history and physical as he is now scheduled for patent foramen ovale closure.      At our last visit in December 2024 we ordered a follow-up Zio patch monitor as he only had 2 weeks of monitoring previously.  There was no A-fib on his second monitor.      He also had a positive lupus anticoagulant lab on December 6 but a follow-up repeat lupus anticoagulant on 1/7/2025 was negative.  He was recommended by his hematologist to follow-up with us in cardiology for consideration of PFO closure after that second negative lupus anticoagulant test.  He is also working with neurology on secondary stroke prevention and was recommended to follow-up with us regarding PFO closure.    He has been doing well and has not had any new cardiac symptoms.  Specifically denies palpitations, shortness of breath, chest pain, edema.  Tolerating his medications.  He uses CPAP for sleep apnea although it is a  bit challenging to tolerate the mask throughout the night sometimes.    We had a detailed discussion of the risks and benefits of the procedure including but not limited to access site bleeding, tamponade, device embolization, atrial fibrillation or other arrhythmias, stroke, MI, need for emergency surgery or death related to the procedure.  He does not have a history of complications with anesthesia or sedation.      Diagnostic studies reviewed for today's visit:  14-day Zio patch reported 1/23/2025 no atrial fibrillation  Transesophageal echo 12/12/2024 PFO with moderate right to left shunting.  Moderately long tract.  14-day Zio patch December 2024 no A-fib      CURRENT MEDICATIONS:  Current Outpatient Medications   Medication Sig Dispense Refill    allopurinol (ZYLOPRIM) 100 MG tablet Take 1 tablet (100 mg) by mouth daily. 90 tablet 3    aspirin 81 MG EC tablet Take 1 tablet (81 mg) by mouth daily. 30 tablet 0    atorvastatin (LIPITOR) 40 MG tablet Take 1 tablet (40 mg) by mouth every evening. 90 tablet 3    clopidogrel (PLAVIX) 75 MG tablet Take 1 tablet (75 mg) by mouth daily. For the day before your procedure ONLY (2/26) please take 300 mg (4 tabs). Then, beginning the day of your procedure (2/27), take 75 mg (1 tab) every day. 90 tablet 1    colchicine (COLCRYS) 0.6 MG tablet TAKE 1 TABLET(0.6 MG) BY MOUTH TWICE DAILY (Patient taking differently: PRN) 60 tablet 3    fluticasone (FLONASE) 50 MCG/ACT nasal spray Spray 1 spray into both nostrils daily. (Patient taking differently: Spray 1 spray into both nostrils daily. PRN) 16 g 1       ALLERGIES     Allergies   Allergen Reactions    Indomethacin Other (See Comments)     PN: HA       PAST MEDICAL, SURGICAL, FAMILY, SOCIAL HISTORY:  History was reviewed and updated as needed, see medical record.    REVIEW OF SYSTEMS:  Skin:        Eyes:       ENT:  Positive for vertigo  Respiratory:  Positive for sleep apnea, CPAP  Cardiovascular:  Negative, palpitations, chest  pain, syncope or near-syncope, cyanosis, fatigue, edema    Gastroenterology:      Genitourinary:       Musculoskeletal:       Neurologic:       Psychiatric:       Heme/Lymph/Imm:       Endocrine:         PHYSICAL EXAM:      BP: 134/85 Pulse: 69            Vital Signs with Ranges  Pulse:  [69] 69  BP: (134)/(85) 134/85  212 lbs 0 oz    Constitutional: awake, alert, no distress  Eyes: sclera nonicteric  ENT: trachea midline  Respiratory: Clear to auscultation bilaterally  Cardiovascular: Regular rate and rhythm no murmur rub or gallop  GI: nondistended, nontender, bowel sounds present  Skin: dry, no rash no edema  Musculoskeletal: grossly normal muscle bulk and tone  Neurologic: no  gross focal deficits  Neuropsychiatric: normal affect    Recent Lab Results:  The following labs were reviewed today:    LIPID RESULTS:  Lab Results   Component Value Date    CHOL 105 01/24/2025    CHOL 217 (H) 08/13/2020    HDL 27 (L) 01/24/2025    HDL 37 (L) 08/13/2020    LDL 56 01/24/2025     (H) 08/13/2020    TRIG 111 01/24/2025    TRIG 244 (H) 08/13/2020    CHOLHDLRATIO 5.7 (H) 05/26/2015       LIVER ENZYME RESULTS:  Lab Results   Component Value Date    AST 32 01/07/2025    AST 20 08/13/2020    ALT 43 01/24/2025    ALT 49 08/13/2020       CBC RESULTS:  Lab Results   Component Value Date    WBC 7.7 01/07/2025    WBC 6.8 02/15/2019    RBC 5.47 01/07/2025    RBC 5.20 02/15/2019    HGB 16.8 01/07/2025    HGB 15.9 02/15/2019    HCT 47.8 01/07/2025    HCT 46.5 02/15/2019    MCV 87 01/07/2025    MCV 89 02/15/2019    MCH 30.7 01/07/2025    MCH 30.6 02/15/2019    MCHC 35.1 01/07/2025    MCHC 34.2 02/15/2019    RDW 12.1 01/07/2025    RDW 12.3 02/15/2019     01/07/2025     02/15/2019       BMP RESULTS:  Lab Results   Component Value Date     01/24/2025     08/13/2020    POTASSIUM 4.3 01/24/2025    POTASSIUM 3.9 08/25/2021    POTASSIUM 4.3 08/13/2020    CHLORIDE 105 01/24/2025    CHLORIDE 103 08/25/2021     CHLORIDE 103 08/13/2020    CO2 28 01/24/2025    CO2 26 08/25/2021    CO2 31 08/13/2020    ANIONGAP 8 01/24/2025    ANIONGAP 12 08/25/2021    ANIONGAP 4 08/13/2020    GLC 99 01/24/2025     (H) 12/06/2024    GLC 84 08/25/2021    GLC 89 08/13/2020    BUN 13.9 01/24/2025    BUN 13 08/25/2021    BUN 13 08/13/2020    CR 1.19 (H) 01/24/2025    CR 1.14 08/13/2020    GFRESTIMATED 76 01/24/2025    GFRESTIMATED 79 08/13/2020    GFRESTBLACK >90 08/13/2020    JACK 10.0 01/24/2025    JACK 9.3 08/13/2020        A1C RESULTS:  Lab Results   Component Value Date    A1C 5.1 12/05/2024    A1C 5.2 08/13/2020       INR RESULTS:  Lab Results   Component Value Date    INR 1.03 01/07/2025    INR 1.04 12/06/2024

## 2025-02-25 NOTE — LETTER
2/25/2025    Toni Freedman, DO  [address]  [facility] [address]    RE: Chino Samuel       Dear Colleague,     I had the pleasure of seeing Chino Samuel in the [facility].  [facility] Progress note    Assessment:  Patent foramen ovale  History of CVA, left caudate tail and striatal capsular subacute ischemic infarcts December 2024  Recent emergency department evaluation for vertigo 2/18/2025  Dyslipidemia  Obstructive sleep apnea, uses CPAP    Plan:  Percutaneous patent foramen ovale closure with intracardiac echo and anticipated closure with [place] cardio form septal occluder device scheduled on 2/27/2025.  Pre and postprocedure instructions were reviewed with the patient.    Take clopidogrel 300 mg p.o. the evening before the procedure followed by 75 mg p.o. daily.    The longitudinal plan of care for the diagnosis(es)/condition(s) as documented were addressed during this visit. Due to the added complexity in care, I will continue to support Chino in the subsequent management and with ongoing continuity of care.      HPI:     44-year-old male patient with history of stroke in December 2024 presenting with balance issues and vertigo, left facial droop and slurred speech.  With PFO on DAYO is here for history and physical as he is now scheduled for patent foramen ovale closure.      At our last visit in December 2024 we ordered a follow-up Zio patch monitor as he only had 2 weeks of monitoring previously.  There was no A-fib on his second monitor.      He also had a positive lupus anticoagulant lab on December 6 but a follow-up repeat lupus anticoagulant on 1/7/2025 was negative.  He was recommended by his hematologist to follow-up with us in cardiology for consideration of PFO closure after that second negative lupus anticoagulant test.  He is also working with neurology on secondary stroke prevention and was recommended to follow-up with us regarding PFO closure.    He has  been doing well and has not had any new cardiac symptoms.  Specifically denies palpitations, shortness of breath, chest pain, edema.  Tolerating his medications.  He uses CPAP for sleep apnea although it is a bit challenging to tolerate the mask throughout the night sometimes.    Diagnostic studies reviewed for today's visit:  14-day Zio patch reported 1/23/2025 no atrial fibrillation  Transesophageal echo 12/12/2024 PFO with moderate right to left shunting.  Moderately long tract.  14-day Zio patch December 2024 no A-fib      CURRENT MEDICATIONS:  Current Outpatient Medications   Medication Sig Dispense Refill     allopurinol (ZYLOPRIM) 100 MG tablet Take 1 tablet (100 mg) by mouth daily. 90 tablet 3     aspirin 81 MG EC tablet Take 1 tablet (81 mg) by mouth daily. 30 tablet 0     atorvastatin (LIPITOR) 40 MG tablet Take 1 tablet (40 mg) by mouth every evening. 90 tablet 3     clopidogrel (PLAVIX) 75 MG tablet Take 1 tablet (75 mg) by mouth daily. For the day before your procedure ONLY (2/26) please take 300 mg (4 tabs). Then, beginning the day of your procedure (2/27), take 75 mg (1 tab) every day. 90 tablet 1     colchicine (COLCRYS) 0.6 MG tablet TAKE 1 TABLET(0.6 MG) BY MOUTH TWICE DAILY (Patient taking differently: PRN) 60 tablet 3     fluticasone (FLONASE) 50 MCG/ACT nasal spray Spray 1 spray into both nostrils daily. (Patient taking differently: Spray 1 spray into both nostrils daily. PRN) 16 g 1       ALLERGIES     Allergies   Allergen Reactions     Indomethacin Other (See Comments)     PN: HA       PAST MEDICAL, SURGICAL, FAMILY, SOCIAL HISTORY:  History was reviewed and updated as needed, see medical record.    REVIEW OF SYSTEMS:  Skin:        Eyes:       ENT:  Positive for vertigo  Respiratory:  Positive for sleep apnea, CPAP  Cardiovascular:  Negative, palpitations, chest pain, syncope or near-syncope, cyanosis, fatigue, edema    Gastroenterology:      Genitourinary:       Musculoskeletal:        Neurologic:       Psychiatric:       Heme/Lymph/Imm:       Endocrine:         PHYSICAL EXAM:      BP: 134/85 Pulse: 69            Vital Signs with Ranges  Pulse:  [69] 69  BP: (134)/(85) 134/85  212 lbs 0 oz    Constitutional: awake, alert, no distress  Eyes: sclera nonicteric  ENT: trachea midline  Respiratory: Clear to auscultation bilaterally  Cardiovascular: Regular rate and rhythm no murmur rub or gallop  GI: nondistended, nontender, bowel sounds present  Skin: dry, no rash no edema  Musculoskeletal: grossly normal muscle bulk and tone  Neurologic: no  gross focal deficits  Neuropsychiatric: normal affect    Recent Lab Results:  The following labs were reviewed today:    LIPID RESULTS:  Lab Results   Component Value Date    CHOL 105 01/24/2025    CHOL 217 (H) 08/13/2020    HDL 27 (L) 01/24/2025    HDL 37 (L) 08/13/2020    LDL 56 01/24/2025     (H) 08/13/2020    TRIG 111 01/24/2025    TRIG 244 (H) 08/13/2020    CHOLHDLRATIO 5.7 (H) 05/26/2015       LIVER ENZYME RESULTS:  Lab Results   Component Value Date    AST 32 01/07/2025    AST 20 08/13/2020    ALT 43 01/24/2025    ALT 49 08/13/2020       CBC RESULTS:  Lab Results   Component Value Date    WBC 7.7 01/07/2025    WBC 6.8 02/15/2019    RBC 5.47 01/07/2025    RBC 5.20 02/15/2019    HGB 16.8 01/07/2025    HGB 15.9 02/15/2019    HCT 47.8 01/07/2025    HCT 46.5 02/15/2019    MCV 87 01/07/2025    MCV 89 02/15/2019    MCH 30.7 01/07/2025    MCH 30.6 02/15/2019    MCHC 35.1 01/07/2025    MCHC 34.2 02/15/2019    RDW 12.1 01/07/2025    RDW 12.3 02/15/2019     01/07/2025     02/15/2019       BMP RESULTS:  Lab Results   Component Value Date     01/24/2025     08/13/2020    POTASSIUM 4.3 01/24/2025    POTASSIUM 3.9 08/25/2021    POTASSIUM 4.3 08/13/2020    CHLORIDE 105 01/24/2025    CHLORIDE 103 08/25/2021    CHLORIDE 103 08/13/2020    CO2 28 01/24/2025    CO2 26 08/25/2021    CO2 31 08/13/2020    ANIONGAP 8 01/24/2025    ANIONGAP 12  08/25/2021    ANIONGAP 4 08/13/2020    GLC 99 01/24/2025     (H) 12/06/2024    GLC 84 08/25/2021    GLC 89 08/13/2020    BUN 13.9 01/24/2025    BUN 13 08/25/2021    BUN 13 08/13/2020    CR 1.19 (H) 01/24/2025    CR 1.14 08/13/2020    GFRESTIMATED 76 01/24/2025    GFRESTIMATED 79 08/13/2020    GFRESTBLACK >90 08/13/2020    JACK 10.0 01/24/2025    JACK 9.3 08/13/2020        A1C RESULTS:  Lab Results   Component Value Date    A1C 5.1 12/05/2024    A1C 5.2 08/13/2020       INR RESULTS:  Lab Results   Component Value Date    INR 1.03 01/07/2025    INR 1.04 12/06/2024                             Thank you for allowing me to participate in the care of your patient.      Sincerely,     Kait Leroy MD     [facility]  cc:   Kait Leroy MD  [id number] [id number]       none

## 2025-02-27 ENCOUNTER — APPOINTMENT (OUTPATIENT)
Dept: CARDIOLOGY | Facility: CLINIC | Age: 45
End: 2025-02-27
Attending: INTERNAL MEDICINE
Payer: COMMERCIAL

## 2025-02-27 ENCOUNTER — HOSPITAL ENCOUNTER (OUTPATIENT)
Facility: CLINIC | Age: 45
Discharge: HOME | End: 2025-02-27
Attending: INTERNAL MEDICINE | Admitting: INTERNAL MEDICINE
Payer: COMMERCIAL

## 2025-02-27 VITALS
RESPIRATION RATE: 16 BRPM | SYSTOLIC BLOOD PRESSURE: 126 MMHG | WEIGHT: 207 LBS | HEART RATE: 67 BPM | DIASTOLIC BLOOD PRESSURE: 78 MMHG | OXYGEN SATURATION: 98 % | TEMPERATURE: 97.7 F | HEIGHT: 70 IN | BODY MASS INDEX: 29.63 KG/M2

## 2025-02-27 DIAGNOSIS — I51.0 CARDIAC SEPTAL DEFECT, ACQUIRED: ICD-10-CM

## 2025-02-27 DIAGNOSIS — Q21.12 PFO (PATENT FORAMEN OVALE): ICD-10-CM

## 2025-02-27 LAB
ACT BLD: 271 SECONDS (ref 74–150)
ACT BLD: 284 SECONDS (ref 74–150)
ACT BLD: 345 SECONDS (ref 74–150)
ANION GAP SERPL CALCULATED.3IONS-SCNC: 12 MMOL/L (ref 7–15)
APTT PPP: 23 SECONDS (ref 22–38)
ATRIAL RATE - MUSE: 68 BPM
CHLORIDE SERPL-SCNC: 102 MMOL/L (ref 98–107)
CREAT SERPL-MCNC: 1.03 MG/DL (ref 0.67–1.17)
DIASTOLIC BLOOD PRESSURE - MUSE: NORMAL MMHG
EGFRCR SERPLBLD CKD-EPI 2021: >90 ML/MIN/1.73M2
ERYTHROCYTE [DISTWIDTH] IN BLOOD BY AUTOMATED COUNT: 11.8 % (ref 10–15)
HCO3 SERPL-SCNC: 24 MMOL/L (ref 22–29)
HCT VFR BLD AUTO: 45.7 % (ref 40–53)
HGB BLD-MCNC: 16.4 G/DL (ref 13.3–17.7)
INR PPP: 1.06 (ref 0.85–1.15)
INTERPRETATION ECG - MUSE: NORMAL
LVEF ECHO: NORMAL
MCH RBC QN AUTO: 30.8 PG (ref 26.5–33)
MCHC RBC AUTO-ENTMCNC: 35.9 G/DL (ref 31.5–36.5)
MCV RBC AUTO: 86 FL (ref 78–100)
P AXIS - MUSE: 66 DEGREES
PLATELET # BLD AUTO: 212 10E3/UL (ref 150–450)
POTASSIUM SERPL-SCNC: 4.3 MMOL/L (ref 3.4–5.3)
PR INTERVAL - MUSE: 172 MS
QRS DURATION - MUSE: 80 MS
QT - MUSE: 396 MS
QTC - MUSE: 421 MS
R AXIS - MUSE: 82 DEGREES
RBC # BLD AUTO: 5.33 10E6/UL (ref 4.4–5.9)
SODIUM SERPL-SCNC: 138 MMOL/L (ref 135–145)
SYSTOLIC BLOOD PRESSURE - MUSE: NORMAL MMHG
T AXIS - MUSE: 42 DEGREES
VENTRICULAR RATE- MUSE: 68 BPM
WBC # BLD AUTO: 10.5 10E3/UL (ref 4–11)

## 2025-02-27 PROCEDURE — C1894 INTRO/SHEATH, NON-LASER: HCPCS | Performed by: INTERNAL MEDICINE

## 2025-02-27 PROCEDURE — 85730 THROMBOPLASTIN TIME PARTIAL: CPT | Performed by: INTERNAL MEDICINE

## 2025-02-27 PROCEDURE — 36415 COLL VENOUS BLD VENIPUNCTURE: CPT | Performed by: INTERNAL MEDICINE

## 2025-02-27 PROCEDURE — 93580 TRANSCATH CLOSURE OF ASD: CPT | Performed by: INTERNAL MEDICINE

## 2025-02-27 PROCEDURE — 999N000184 HC STATISTIC TELEMETRY

## 2025-02-27 PROCEDURE — 99152 MOD SED SAME PHYS/QHP 5/>YRS: CPT | Mod: GC | Performed by: INTERNAL MEDICINE

## 2025-02-27 PROCEDURE — C1817 SEPTAL DEFECT IMP SYS: HCPCS | Performed by: INTERNAL MEDICINE

## 2025-02-27 PROCEDURE — 250N000013 HC RX MED GY IP 250 OP 250 PS 637: Performed by: INTERNAL MEDICINE

## 2025-02-27 PROCEDURE — 93580 TRANSCATH CLOSURE OF ASD: CPT | Mod: GC | Performed by: INTERNAL MEDICINE

## 2025-02-27 PROCEDURE — 93005 ELECTROCARDIOGRAM TRACING: CPT

## 2025-02-27 PROCEDURE — 999N000054 HC STATISTIC EKG NON-CHARGEABLE

## 2025-02-27 PROCEDURE — 272N000001 HC OR GENERAL SUPPLY STERILE: Performed by: INTERNAL MEDICINE

## 2025-02-27 PROCEDURE — 85347 COAGULATION TIME ACTIVATED: CPT

## 2025-02-27 PROCEDURE — 250N000011 HC RX IP 250 OP 636: Performed by: INTERNAL MEDICINE

## 2025-02-27 PROCEDURE — 93010 ELECTROCARDIOGRAM REPORT: CPT | Mod: XU | Performed by: INTERNAL MEDICINE

## 2025-02-27 PROCEDURE — 80051 ELECTROLYTE PANEL: CPT | Performed by: INTERNAL MEDICINE

## 2025-02-27 PROCEDURE — 99152 MOD SED SAME PHYS/QHP 5/>YRS: CPT | Performed by: INTERNAL MEDICINE

## 2025-02-27 PROCEDURE — 93662 INTRACARDIAC ECG (ICE): CPT | Mod: 26 | Performed by: INTERNAL MEDICINE

## 2025-02-27 PROCEDURE — 93662 INTRACARDIAC ECG (ICE): CPT | Performed by: INTERNAL MEDICINE

## 2025-02-27 PROCEDURE — 258N000003 HC RX IP 258 OP 636: Performed by: INTERNAL MEDICINE

## 2025-02-27 PROCEDURE — C1760 CLOSURE DEV, VASC: HCPCS | Performed by: INTERNAL MEDICINE

## 2025-02-27 PROCEDURE — 93321 DOPPLER ECHO F-UP/LMTD STD: CPT | Mod: 26 | Performed by: INTERNAL MEDICINE

## 2025-02-27 PROCEDURE — C1769 GUIDE WIRE: HCPCS | Performed by: INTERNAL MEDICINE

## 2025-02-27 PROCEDURE — 85027 COMPLETE CBC AUTOMATED: CPT | Performed by: INTERNAL MEDICINE

## 2025-02-27 PROCEDURE — 85610 PROTHROMBIN TIME: CPT | Performed by: INTERNAL MEDICINE

## 2025-02-27 PROCEDURE — 999N000071 HC STATISTIC HEART CATH LAB OR EP LAB

## 2025-02-27 PROCEDURE — 93325 DOPPLER ECHO COLOR FLOW MAPG: CPT

## 2025-02-27 PROCEDURE — C1759 CATH, INTRA ECHOCARDIOGRAPHY: HCPCS | Performed by: INTERNAL MEDICINE

## 2025-02-27 PROCEDURE — 99153 MOD SED SAME PHYS/QHP EA: CPT | Performed by: INTERNAL MEDICINE

## 2025-02-27 PROCEDURE — 250N000009 HC RX 250: Performed by: INTERNAL MEDICINE

## 2025-02-27 PROCEDURE — 82565 ASSAY OF CREATININE: CPT | Performed by: INTERNAL MEDICINE

## 2025-02-27 PROCEDURE — 93308 TTE F-UP OR LMTD: CPT | Mod: 26 | Performed by: INTERNAL MEDICINE

## 2025-02-27 PROCEDURE — 93325 DOPPLER ECHO COLOR FLOW MAPG: CPT | Mod: 26 | Performed by: INTERNAL MEDICINE

## 2025-02-27 DEVICE — OCCLUDER CARDIOFORM SEPTAL 30MM: Type: IMPLANTABLE DEVICE | Status: FUNCTIONAL

## 2025-02-27 RX ORDER — NALOXONE HYDROCHLORIDE 0.4 MG/ML
0.2 INJECTION, SOLUTION INTRAMUSCULAR; INTRAVENOUS; SUBCUTANEOUS
Status: DISCONTINUED | OUTPATIENT
Start: 2025-02-27 | End: 2025-02-27 | Stop reason: HOSPADM

## 2025-02-27 RX ORDER — LIDOCAINE HYDROCHLORIDE 20 MG/ML
5 SOLUTION OROPHARYNGEAL
Status: DISCONTINUED | OUTPATIENT
Start: 2025-02-27 | End: 2025-02-27 | Stop reason: HOSPADM

## 2025-02-27 RX ORDER — FENTANYL CITRATE 50 UG/ML
25 INJECTION, SOLUTION INTRAMUSCULAR; INTRAVENOUS
Status: DISCONTINUED | OUTPATIENT
Start: 2025-02-27 | End: 2025-02-27 | Stop reason: HOSPADM

## 2025-02-27 RX ORDER — HEPARIN SODIUM 1000 [USP'U]/ML
INJECTION, SOLUTION INTRAVENOUS; SUBCUTANEOUS
Status: DISCONTINUED | OUTPATIENT
Start: 2025-02-27 | End: 2025-02-27 | Stop reason: HOSPADM

## 2025-02-27 RX ORDER — ATROPINE SULFATE 0.1 MG/ML
0.5 INJECTION INTRAVENOUS
Status: DISCONTINUED | OUTPATIENT
Start: 2025-02-27 | End: 2025-02-27 | Stop reason: HOSPADM

## 2025-02-27 RX ORDER — ASPIRIN 81 MG/1
81 TABLET, CHEWABLE ORAL ONCE
Status: COMPLETED | OUTPATIENT
Start: 2025-02-27 | End: 2025-02-27

## 2025-02-27 RX ORDER — CLOPIDOGREL BISULFATE 75 MG/1
75 TABLET ORAL DAILY
Status: DISCONTINUED | OUTPATIENT
Start: 2025-02-28 | End: 2025-02-27 | Stop reason: HOSPADM

## 2025-02-27 RX ORDER — SODIUM CHLORIDE 9 MG/ML
INJECTION, SOLUTION INTRAVENOUS ONCE
Status: COMPLETED | OUTPATIENT
Start: 2025-02-27 | End: 2025-02-27

## 2025-02-27 RX ORDER — PROTAMINE SULFATE 10 MG/ML
INJECTION, SOLUTION INTRAVENOUS
Status: DISCONTINUED | OUTPATIENT
Start: 2025-02-27 | End: 2025-02-27 | Stop reason: HOSPADM

## 2025-02-27 RX ORDER — CLOPIDOGREL 300 MG/1
600 TABLET, FILM COATED ORAL ONCE
Status: COMPLETED | OUTPATIENT
Start: 2025-02-27 | End: 2025-02-27

## 2025-02-27 RX ORDER — FLUMAZENIL 0.1 MG/ML
0.2 INJECTION, SOLUTION INTRAVENOUS
Status: DISCONTINUED | OUTPATIENT
Start: 2025-02-27 | End: 2025-02-27 | Stop reason: HOSPADM

## 2025-02-27 RX ORDER — LIDOCAINE 40 MG/G
CREAM TOPICAL
Status: DISCONTINUED | OUTPATIENT
Start: 2025-02-27 | End: 2025-02-27 | Stop reason: HOSPADM

## 2025-02-27 RX ORDER — ASPIRIN 81 MG/1
81 TABLET ORAL DAILY
Status: DISCONTINUED | OUTPATIENT
Start: 2025-02-27 | End: 2025-02-27 | Stop reason: HOSPADM

## 2025-02-27 RX ORDER — CEFAZOLIN SODIUM 2 G/50ML
2 SOLUTION INTRAVENOUS
Status: COMPLETED | OUTPATIENT
Start: 2025-02-27 | End: 2025-02-27

## 2025-02-27 RX ORDER — FENTANYL CITRATE 50 UG/ML
INJECTION, SOLUTION INTRAMUSCULAR; INTRAVENOUS
Status: DISCONTINUED | OUTPATIENT
Start: 2025-02-27 | End: 2025-02-27 | Stop reason: HOSPADM

## 2025-02-27 RX ORDER — ONDANSETRON 4 MG/1
4 TABLET, ORALLY DISINTEGRATING ORAL EVERY 6 HOURS PRN
Status: DISCONTINUED | OUTPATIENT
Start: 2025-02-27 | End: 2025-02-27 | Stop reason: HOSPADM

## 2025-02-27 RX ORDER — ONDANSETRON 2 MG/ML
8 INJECTION INTRAMUSCULAR; INTRAVENOUS ONCE
Status: COMPLETED | OUTPATIENT
Start: 2025-02-27 | End: 2025-02-27

## 2025-02-27 RX ORDER — ONDANSETRON 2 MG/ML
4 INJECTION INTRAMUSCULAR; INTRAVENOUS EVERY 6 HOURS PRN
Status: DISCONTINUED | OUTPATIENT
Start: 2025-02-27 | End: 2025-02-27 | Stop reason: HOSPADM

## 2025-02-27 RX ORDER — NALOXONE HYDROCHLORIDE 0.4 MG/ML
0.4 INJECTION, SOLUTION INTRAMUSCULAR; INTRAVENOUS; SUBCUTANEOUS
Status: DISCONTINUED | OUTPATIENT
Start: 2025-02-27 | End: 2025-02-27 | Stop reason: HOSPADM

## 2025-02-27 RX ORDER — CLOPIDOGREL BISULFATE 75 MG/1
75 TABLET ORAL ONCE
Status: COMPLETED | OUTPATIENT
Start: 2025-02-27 | End: 2025-02-27

## 2025-02-27 RX ADMIN — ASPIRIN 81 MG: 81 TABLET, COATED ORAL at 14:09

## 2025-02-27 RX ADMIN — ONDANSETRON 8 MG: 2 INJECTION, SOLUTION INTRAMUSCULAR; INTRAVENOUS at 08:49

## 2025-02-27 RX ADMIN — ASPIRIN 81 MG: 81 TABLET, COATED ORAL at 09:46

## 2025-02-27 RX ADMIN — CLOPIDOGREL BISULFATE 75 MG: 75 TABLET ORAL at 09:46

## 2025-02-27 RX ADMIN — SODIUM CHLORIDE: 0.9 INJECTION, SOLUTION INTRAVENOUS at 08:49

## 2025-02-27 RX ADMIN — CEFAZOLIN SODIUM 2 G: 2 SOLUTION INTRAVENOUS at 11:19

## 2025-02-27 ASSESSMENT — ACTIVITIES OF DAILY LIVING (ADL)
ADLS_ACUITY_SCORE: 48
ADLS_ACUITY_SCORE: 46
ADLS_ACUITY_SCORE: 48
ADLS_ACUITY_SCORE: 48

## 2025-02-27 NOTE — Clinical Note
Health Maintenance Due   Topic Date Due   • Shingles Vaccine (1 of 2) [birth year]28/1989   • Colonoscopy Risk  07/24/2016   • Diabetes Eye Exam  05/23/2020   • Diabetes Foot Exam  01/06/2021       Patient is due for topics as listed above but is not proceeding with Immunization(s) Shingles at this time.           Intracardiac Echocardiography catheter removed.

## 2025-02-27 NOTE — DISCHARGE INSTRUCTIONS
Patent Foramen Ovale (PFO)/Atrial Septal Defect (ASD) Closure    After you go home:    You may resume your normal diet  Increase your fluid intake for two days    For 48 hours - due to the sedation you received:  Do NOT make any important or legal decisions  Do NOT drink alcohol  Do NOT drive or operate machines at home or at work    Care of Groin Puncture Site:    For the first 48 hours - check the puncture site every 1-2 hours for bleeding, bruising, drainage, redness, warmth or swelling.  If you develop these symptoms call the RN coordinator.  For 2 days, when you cough, sneeze, laugh or move your bowels, hold your hand over the puncture site and press firmly  Remove the band aid after 24 hours. If there is minor oozing, apply another band aid and remove it after 12 hours  It is normal to have a small bruise or pea size lump at the site  You may use ice packs at groin sites for 20 minutes at a time for groin discomfort  You may shower. Do NOT take a bath, use a hot tub or pool until groin site heals, which may take up to a week.  Do NOT scrub the site and do NOT use lotion or powder near the puncture site until site is fully healed.    Activity:    For 2 days:  No stooping or squatting  Do NOT do any heavy activity such as exercise, lifting, or straining.   No housework, yard work or any activity that make you sweat  Do NOT lift more than 10 pounds    For 1 week:  No lifting greater than 10 lbs    For 2 to 3 weeks:  No lifting greater than 20-30 lbs    For 4 weeks:  Avoid heavy aerobic activity (walking and stair climbing is ok)    For 6 weeks:  Avoid biking, motorcycle riding, 4 wheelers, boating, and jet skiing    For 3 months:  Avoid contact sports (scuba diving, chelsie diving, hockey, basketball, volleyball, football, baseball, softball, soccer & lacrosse)   No hunting with a rifle     Bleeding:    If you start bleeding from the site in your groin, lie down flat and press firmly on/above the site for 10 minutes.    Once bleeding stops lay flat for 1-2 hours.   Call [facility] as soon as you can.       Call 911 right away if you have heavy bleeding or bleeding that does not stop.    Medications:    Take one 81 mg Aspirin with food daily. DO NOT discontinue taking the Aspirin without your cardiologist's approval  Take Plavix 75 mg with food daily for 6 months  Inform all your providers and dentist that you will need antibiotic prophylaxis prior to any procedure for 6 months after your closure device was implanted to prevent infection.  No elective dental work for 6 months    Follow Up Appointments:    You will see the Nurse Practitioner in 1 month with an echocardiogram (ultrasound of heart)   You will see your cardiologist in 6 months with another echocardiogram    Call the clinic if:    If your heart feels like it is  fluttering  - contact your cardiologist immediately.   You have increased pain or a large or growing hard lump around the site.  The site is red, swollen, hot or tender.  Blood or fluid is draining from the site.  You have chills or a fever greater than 101 F (38 C).  Your leg feels numb, cool or changes color.  You have hives, a rash or unusual itching.  New pain in the back or belly - or any chest or groin pain that is not relieved with Ibuprofen or Acetaminophen.  Any questions or concerns.        [facility] [place]:    DOREEN Rios @ [phone] (Mon-Fri 8am-4pm)  [phone] [facility] (after hours)    Or you may contact your provider via My Chart

## 2025-02-27 NOTE — PROGRESS NOTES
Care Suites Admission Nursing Note    Patient Information  Name: Chino Samuel  Age: 44 year old  Reason for admission: PFO closure  Care Suites arrival time: 0730    Visitor Information  Name: wife     Patient Admission/Assessment   Pre-procedure assessment complete: Yes  If abnormal assessment/labs, provider notified: N/A  NPO: Yes  Medications held per instructions/orders: N/A  Consent: deferred  If applicable, pregnancy test status: deferred  Patient oriented to room: Yes  Education/questions answered: Yes  Plan/other:     Discharge Planning  Discharge name/phone number: felice Bautista [phone]  Overnight post sedation caregiver: wife  Discharge location: home    Rosario Gonzalez RN

## 2025-02-27 NOTE — PRE-PROCEDURE
GENERAL PRE-PROCEDURE:   Procedure:  Percutaneous PFO closure with intracardia echo  Date/Time:  2/27/2025 10:42 AM    Written consent obtained?: Yes    Risks and benefits: Risks, benefits and alternatives were discussed    Consent given by:  Patient  Patient states understanding of procedure being performed: Yes    Patient's understanding of procedure matches consent: Yes    Procedure consent matches procedure scheduled: Yes    Expected level of sedation:  Moderate  Appropriately NPO:  Yes  ASA Class:  4  Mallampati  :  Grade 2- soft palate, base of uvula, tonsillar pillars, and portion of posterior pharyngeal wall visible  Lungs:  Lungs clear with good breath sounds bilaterally  Heart:  Normal heart sounds and rate  History & Physical reviewed:  History and physical reviewed and no updates needed  Statement of review:  I have reviewed the lab findings, diagnostic data, medications, and the plan for sedation

## 2025-02-27 NOTE — Clinical Note
Chief Complaint   Patient presents with   [facility] Wellness Visit Intracardiac Echocardiography Catheter inserted.

## 2025-03-04 ENCOUNTER — TELEPHONE (OUTPATIENT)
Dept: CARDIOLOGY | Facility: CLINIC | Age: 45
End: 2025-03-04

## 2025-03-04 NOTE — TELEPHONE ENCOUNTER
Patient underwent PFO closure under conscious sedation at [facility] on 2/27/25.     Procedure was percutaneous closure of patent foramen ovale using a 30 mm [place] Cardioform Septal Occluder via LFV and RFV. Subsequently discharged same day.     Patient's post-procedure medication plan as follows:  - Plavix 75 mg daily for 6 months  - ASA 81 mg daily lifelong     Required post-ASD/PFO follow-ups including 1 month echocardiogram with bubble and MARY visit, then 6 month echocardiogram with bubble and device implanter have been arranged, see below.      Writer attempted to call pt for a cardiology post discharge phone call, but no answer. VM left to return my call. DARIUS Mcgee RN.

## 2025-03-06 ENCOUNTER — THERAPY VISIT (OUTPATIENT)
Dept: PHYSICAL THERAPY | Facility: CLINIC | Age: 45
End: 2025-03-06
Attending: FAMILY MEDICINE
Payer: COMMERCIAL

## 2025-03-06 DIAGNOSIS — H81.13 BENIGN PAROXYSMAL POSITIONAL VERTIGO DUE TO BILATERAL VESTIBULAR DISORDER: ICD-10-CM

## 2025-03-06 PROCEDURE — 97112 NEUROMUSCULAR REEDUCATION: CPT | Mod: GP | Performed by: PHYSICAL THERAPIST

## 2025-03-06 PROCEDURE — 97161 PT EVAL LOW COMPLEX 20 MIN: CPT | Mod: GP | Performed by: PHYSICAL THERAPIST

## 2025-03-06 NOTE — PROGRESS NOTES
PHYSICAL THERAPY EVALUATION  Type of Visit: Evaluation       Fall Risk Screen:  Fall screen completed by: PT  Have you fallen 2 or more times in the past year?: No  Have you fallen and had an injury in the past year?: No  Is patient a fall risk?: No    Subjective   Has been having intense dizziness that comes on with random position changes.  If he stands up too quickly or rolls in bed.   Partial hearing loss on the left side that has not resolved yet after a flight in November.  When he would use his peripheral vision a lot, he would notice vertigo symptoms Will have these severe flair ups until he gets medications in him (about 4-5 hours). Has woken up twice with severe vertigo doesn't know if it was caused from rolling though.   No issues with bending down or looking up. When looking quickly back and forth between screens causes some issues.          Presenting condition or subjective complaint: Vertigo issues and patial hearing loss in left ear  Date of onset: 12/05/25    Relevant medical history: Sleep disorder like apnea; Stroke   Dates & types of surgery: PFO closure surgery on [id number]    Prior diagnostic imaging/testing results:       Prior therapy history for the same diagnosis, illness or injury: No      Prior Level of Function  Transfers:   Ambulation:   ADL:   IADL:     Living Environment  Social support: With a significant other or spouse   Type of home: House; 2-story   Stairs to enter the home: Yes 3 Is there a railing: No     Ramp: No   Stairs inside the home: Yes 12 Is there a railing: Yes     Help at home: None  Equipment owned:       Employment: Yes   Hobbies/Interests: Golf, travel, amusement castelan, fishing    Patient goals for therapy: Regain full hearing capability in left ear and cure or reduce vertigo symptoms    Pain assessment:      Objective        VESTIBULAR EVALUATION  ADDITIONAL HISTORY:  Description of symptoms: Nausea or vomiting; Attacks of dizziness; Blurry or  "jumping vision; I feel like the room is spinning  Dizzy attacks:   Start: [id number]   Last attack: [id number]   Frequency of occurrences: 2-3 times per month   Length of attack: 1 day  Difficulty hearing: Left ear  Noise in ears? No    Alleviates symptoms: Zophran and  Worsens symptoms: Standing up  Activities that bring on symptoms: Other  Using my peripheral vision    Pertinent visual history: has glasses but doesn't use them unless watching TV and with driving   Pertinent history of current vestibular problem:   DHI: Total Score: (Patient-Rptd) 44    Cranial Nerve    Cranial Nerve Screen    Olfactory  Ask the patient to identify familiar smells with each nostril separately while the other is occluded.  Normal   Optic Nerve (II)   Test visual acuity using a Snellen chart, assess color vision with Ishihara plates, and check visual fields by confrontation.   Oculomotor Nerve (III), Trochlear Nerve (IV), and Abducens Nerve (VI): Normal   Oculomotor Nerve (III), Trochlear Nerve (IV), and Abducens Nerve (VI):  Observe eye movements by asking the patient to follow a moving object in all directions, checking for any limitations or nystagmus.  Normal   Trigeminal Nerve (V):  Test facial sensation by lightly touching different areas of the face with a cotton swab, and check jaw strength by asking the patient to clench their teeth. Normal   Facial Nerve (VII):  Ask the patient to perform facial expressions like smiling, frowning, raising eyebrows, and puffing out cheeks, observing for symmetry.  Normal   Vestibulocochlear Nerve (VIII):  Check hearing by rubbing fingers by each ear, assess balance by asking the patient to stand with eyes closed.  Abnormal   left ear muffled    Glossopharyngeal Nerve (IX):  Test the gag reflex by touching the back of the throat with a tongue depressor, assess taste on the posterior tongue.  Normal   Vagus Nerve (X):  Observe the patient's voice quality, ask them to say \"ah\" to check for uvula " deviation, and test swallowing ability.  Normal   Accessory Nerve (XI):  Test neck muscle strength by asking the patient to turn their head against resistance and shrug their shoulders.  Normal   Hypoglossal Nerve (XII):  Ask the patient to stick out their tongue and observe for any deviation or tremors Normal          Cervicogenic Screen    Neck ROM Normal   Vertebral Artery Test See cranial nerve testing above    Alar Ligament Test Normal   Transverse Ligament Test Normal   Distraction    Neck Torsion Test (head still, body rotating) Normal   Neck Torsion Test (head and body rotating) Normal   Cover/uncover and alternating cover/uncover test: normal      Oculomotor Screen    Ocular ROM Normal   Smooth Pursuit Normal   Saccades Normal   VOR Normal   VOR Cancellation Normal   Head Impulse Test Abnormal bilateral  left worse than right    Convergence Testing Normal  5cm at 3 reps         Infrared Goggle Exam Vestibular Suppressant in Last 24 Hours? No  Exam Completed With: Infrared goggles   Spontaneous Nystagmus Negative   Gaze Evoked Nystagmus Negative   Head Shake Horizontal Nystagmus Horizontal R   Positional Testing    Supine Head-Hanging Test     Left Right   Jennifer-Hallpike Negative Negative   Sidelying Test     HSCC Supine Roll Test Negative Negative   HSCC Forward Roll Test     [place] and Lean Test -  Sitting Erect    [place] and Lean Test - Seated, Head Bent 60 Degrees Forward    [place] and Lean Test - Seated, Head Bent Backwards       BPPV Canal(s):   BPPV Type:     Dynamic Visual Acuity (DVA)    Static Acuity (LogMar) Line 5   Horizontal Head Movement at 1 Hz (LogMar)    Horizontal Head Movement at 2 Hz (LogMar) Line 5 no symptoms           Assessment & Plan   CLINICAL IMPRESSIONS  Medical Diagnosis: Benign paroxysmal positional vertigo due to bilateral vestibular disorder (H81.13)    Treatment Diagnosis: dizziness   Impression/Assessment: Patient is a 44 year old male with left sided vestibular hypofunction  complaints. Signs and symptoms consistent with labyrinthitis.  The following significant findings have been identified: Impaired balance, Instability, Dizziness, and Disequilibrium . These impairments interfere with their ability to perform self care tasks, work tasks, recreational activities, and household chores as compared to previous level of function.     Clinical Decision Making (Complexity):  Clinical Presentation: Stable/Uncomplicated  Clinical Presentation Rationale: based on medical and personal factors listed in PT evaluation  Clinical Decision Making (Complexity): Low complexity    PLAN OF CARE  Treatment Interventions:  Interventions: Gait Training, Manual Therapy, Neuromuscular Re-education, Therapeutic Activity, Therapeutic Exercise, Self-Care/Home Management    Long Term Goals     PT Goal 1  Goal Identifier: 1  Goal Description: Patient will be able to walk with head turns left/right with no dizziness.  Target Date: 04/03/25  PT Goal 2  Goal Identifier: 2  Goal Description: Patient will be able to work on dual screen monitor for 6+ hours with no sypmptoms of dizziness or nausea.  Target Date: 04/03/25  PT Goal 3  Goal Identifier: 3  Goal Description: Patient will improve DHI to less than 10 in order to show improved functional status related to dizziness.  Target Date: 05/01/25      Frequency of Treatment: 1x/week  Duration of Treatment: 8 weeks    Recommended Referrals to Other Professionals:   Education Assessment:   Learner/Method: Patient  Education Comments: educated on role of PT, POC and HEP    Risks and benefits of evaluation/treatment have been explained.   Patient/Family/caregiver agrees with Plan of Care.     Evaluation Time:     PT Eval, Low Complexity Minutes (89273): 40       Signing Clinician: Vanda Galan PT

## 2025-03-12 NOTE — TELEPHONE ENCOUNTER
Second attempt at calling pt.    Called patient to discuss any questions, review medications, and confirm follow-up plan.     Confirmed that they were discharged with an adequate supply of above medications and reminded the importance of continuing without interruption.     Denies any SOB, chest pain, palpitations or light headedness. Confirms venous puncture site(s) without bleeding, swelling, redness, or signs of infection.     Reminded that patient requires SBE for 6 months post-procedure.    Encouraged to call clinic with any procedure-related questions or concerns. Provided clinic contact information.     Future Appointments   Date Time Provider Department Center   3/13/2025  7:30 AM Vanda Galan, PT [facility]   3/20/2025  9:00 AM [id number]   3/26/2025  9:45 AM [email or web address] [facility]   3/26/2025 12:30 PM Kait Leroy MD [facility]   8/8/2025 10:00 AM Lev Cosme [facility]         Laura Mcgee RN on 3/12/2025 at 3:32 PM

## 2025-03-13 ENCOUNTER — THERAPY VISIT (OUTPATIENT)
Dept: PHYSICAL THERAPY | Facility: CLINIC | Age: 45
End: 2025-03-13
Attending: FAMILY MEDICINE
Payer: COMMERCIAL

## 2025-03-13 DIAGNOSIS — H81.13 BENIGN PAROXYSMAL POSITIONAL VERTIGO DUE TO BILATERAL VESTIBULAR DISORDER: Primary | ICD-10-CM

## 2025-03-13 PROCEDURE — 97112 NEUROMUSCULAR REEDUCATION: CPT | Mod: GP | Performed by: PHYSICAL THERAPIST

## 2025-03-19 ENCOUNTER — THERAPY VISIT (OUTPATIENT)
Dept: PHYSICAL THERAPY | Facility: CLINIC | Age: 45
End: 2025-03-19
Attending: FAMILY MEDICINE
Payer: COMMERCIAL

## 2025-03-19 DIAGNOSIS — R42 DIZZINESS: Primary | ICD-10-CM

## 2025-03-19 DIAGNOSIS — H81.13 BENIGN PAROXYSMAL POSITIONAL VERTIGO DUE TO BILATERAL VESTIBULAR DISORDER: Primary | ICD-10-CM

## 2025-03-19 PROCEDURE — 97530 THERAPEUTIC ACTIVITIES: CPT | Mod: GP | Performed by: PHYSICAL THERAPIST

## 2025-03-20 ENCOUNTER — TELEPHONE (OUTPATIENT)
Dept: OTOLARYNGOLOGY | Facility: CLINIC | Age: 45
End: 2025-03-20

## 2025-03-20 NOTE — TELEPHONE ENCOUNTER
This encounter is being sent to inform the clinic that this patient has a referral from Vee Dukes MD  for the diagnoses of DIZZINESS and has requested that this patient be seen within 1-2 WEEKS and/or with ANY PROVIDER.  Based on the availability of our provider(s), we are unable to accommodate this request.    Were all sites offered this patient?  Yes    Does scheduling algorithm request to schedule next available?  Patient has been scheduled for the first available opening with YARIEL BRANDT on 4/8.  We have informed the patient that the clinic will review their referral and reach out if a sooner appointment is medically necessary.     OPEN TO PH, ER, [facility], [place] AND [facility]

## 2025-03-20 NOTE — TELEPHONE ENCOUNTER
"Just had PFO closure.  Not noted as acute. Is already seeing PT for BPP as well as seen Neurology at this time and no note of it worsening nor treatment plan failure determined.  \"Signs and symptoms consistent with labyrinthitis.\" Per PT eval.    No sooner appts in [place] at this time but would reach out if [employer] becomes avail.  Teresa VALENTINE   Specialty Clinic RN    "

## 2025-03-20 NOTE — TELEPHONE ENCOUNTER
Dr. Carlos and Raciel Chapman don't have sooner appointments available. I ensured patient is on wait list (however if there is a sooner opening, audio will also need to move prior to new visit date).    Mireille Poole RN on 3/20/2025 at 9:27 AM

## 2025-03-26 ENCOUNTER — OFFICE VISIT (OUTPATIENT)
Dept: CARDIOLOGY | Facility: CLINIC | Age: 45
End: 2025-03-26
Attending: INTERNAL MEDICINE
Payer: COMMERCIAL

## 2025-03-26 ENCOUNTER — HOSPITAL ENCOUNTER (OUTPATIENT)
Dept: CARDIOLOGY | Facility: CLINIC | Age: 45
Discharge: HOME | End: 2025-03-26
Attending: INTERNAL MEDICINE
Payer: COMMERCIAL

## 2025-03-26 ENCOUNTER — ORDERS ONLY (AUTO-RELEASED) (OUTPATIENT)
Dept: CARDIOLOGY | Facility: CLINIC | Age: 45
End: 2025-03-26

## 2025-03-26 VITALS
OXYGEN SATURATION: 98 % | RESPIRATION RATE: 16 BRPM | SYSTOLIC BLOOD PRESSURE: 131 MMHG | DIASTOLIC BLOOD PRESSURE: 84 MMHG | BODY MASS INDEX: 30.19 KG/M2 | WEIGHT: 210.9 LBS | HEART RATE: 53 BPM | HEIGHT: 70 IN

## 2025-03-26 DIAGNOSIS — I51.0 CARDIAC SEPTAL DEFECT, ACQUIRED: ICD-10-CM

## 2025-03-26 DIAGNOSIS — Q21.12 PFO (PATENT FORAMEN OVALE): ICD-10-CM

## 2025-03-26 DIAGNOSIS — Q21.12 PATENT FORAMEN OVALE WITH RIGHT TO LEFT SHUNT: ICD-10-CM

## 2025-03-26 DIAGNOSIS — Z87.74 S/P PATENT FORAMEN OVALE CLOSURE: ICD-10-CM

## 2025-03-26 DIAGNOSIS — Q21.12 PATENT FORAMEN OVALE WITH RIGHT TO LEFT SHUNT: Primary | ICD-10-CM

## 2025-03-26 LAB — LVEF ECHO: NORMAL

## 2025-03-26 PROCEDURE — 93005 ELECTROCARDIOGRAM TRACING: CPT

## 2025-03-26 PROCEDURE — 999N000208 ECHOCARDIOGRAM COMPLETE

## 2025-03-26 RX ORDER — METOPROLOL SUCCINATE 25 MG/1
12.5 TABLET, EXTENDED RELEASE ORAL DAILY
Qty: 45 TABLET | Refills: 3 | Status: SHIPPED | OUTPATIENT
Start: 2025-03-26

## 2025-03-26 NOTE — RESULT ENCOUNTER NOTE
EF 60-65%; no WMAs; s/p PFO closure with negative bubble study; mild MR; freq PVCs. Follow up with Dr Angel on 3/26/25.

## 2025-03-26 NOTE — PROGRESS NOTES
[facility] Progress note    Assessment:  Percutaneous closure of patent foramen ovale with 30 mm [place] cardio form septal occluder 2/27/2025.  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 Improving. Plans to see audiology and ENT.   Dyslipidemia, on atorvastatin  obstructive sleep apnea, uses CPAP    Plan:  Trial of Metoprolol succinate 12.5mg PO daily. This can be increased or stopped as needed. Often ectopy improves within the first few months after device implantation.   One week ziopatch monitor, screen for atrial fibrillation  Continue aspirin indefinitely, clopidogrel for toal of 6 months  He was encouraged to increase his regular moderate activity   Follow up in 5 months with echo per protocol post PFO closure.     The longitudinal plan of care for the diagnosis(es)/condition(s) as documented were addressed during this visit. Due to the added complexity in care, I will continue to support Chino in the subsequent management and with ongoing continuity of care.      HPI:     Chino Samuel is a very nice 44-year-old male patient with history of CVA in December 2023 and percutaneous PFO closure on 2/27/2025 is here for follow-up.  He has unfortunately been having ongoing issues with vertigo which provokes severe episodes of nausea and vomiting.  Fortunately this has been improving. He has not had an episode for a week. He plans to see ENT and audiology.     Today he notes palpitations which started about one week after the procedure.  He notices his heart rate seems to increase easily. He has had fatigue since his stroke and then he was fairly sedentary the first few weeks after the procedure, and last week tried to start exercising again.  He had an echo today which showed no residual shunt across the PFO occluder device.  He was having frequent  PVCs during his echo and an EKG today shows sinus rhythm with sinus arrhythmia and frequent PACs.     He is wondering if he can have a beer when he goes to a [employer] game in a month.     Diagnostic studies reviewed for today's visit:  Echo today EF 60 to 65%, mild mitral regurgitation negative bubble study no pericardial effusion frequent PVCs.   Percutaneous closure of patent foramen ovale using a 30 mm [place] Cardioform Septal Occluder 2/27/2025  EKG today: Sinus rhythm with sinus arrhythmia and frequent PACs      CURRENT MEDICATIONS:  Current Outpatient Medications   Medication Sig Dispense Refill    allopurinol (ZYLOPRIM) 100 MG tablet Take 1 tablet (100 mg) by mouth daily. 90 tablet 3    aspirin 81 MG EC tablet Take 1 tablet (81 mg) by mouth daily. 30 tablet 0    atorvastatin (LIPITOR) 40 MG tablet Take 1 tablet (40 mg) by mouth every evening. 90 tablet 3    clopidogrel (PLAVIX) 75 MG tablet Take 1 tablet (75 mg) by mouth daily. For the day before your procedure ONLY (2/26) please take 300 mg (4 tabs). Then, beginning the day of your procedure (2/27), take 75 mg (1 tab) every day. 90 tablet 1    colchicine (COLCRYS) 0.6 MG tablet TAKE 1 TABLET(0.6 MG) BY MOUTH TWICE DAILY 60 tablet 3    fluticasone (FLONASE) 50 MCG/ACT nasal spray Spray 1 spray into both nostrils daily. (Patient taking differently: Spray 1 spray into both nostrils daily. PRN) 16 g 1   Also has PRN meclizine, dramamine, zofran at home    ALLERGIES     Allergies   Allergen Reactions    Indomethacin Other (See Comments)     PN: HA       PAST MEDICAL, SURGICAL, FAMILY, SOCIAL HISTORY:  History was reviewed and updated as needed, see medical record.  He does not drink any alcohol     REVIEW OF SYSTEMS:  Skin:        Eyes:       ENT:       Respiratory:    shortness of breath  Cardiovascular:    palpitations, fatigue  Gastroenterology:      Genitourinary:       Musculoskeletal:       Neurologic:       Psychiatric:       Heme/Lymph/Imm:       Endocrine:          PHYSICAL EXAM:      BP: 131/84 Pulse: 53   Resp: 16 SpO2: 98 %      Vital Signs with Ranges  Pulse:  [53] 53  Resp:  [16] 16  BP: (131)/(84) 131/84  SpO2:  [98 %] 98 %  210 lbs 14.4 oz    Constitutional: awake, alert, no distress  Eyes: sclera nonicteric  ENT: trachea midline  Respiratory: clear bilaterally  Cardiovascular: regular rate and rhythm, 2 ectopic beats during auscultation, no murmur  GI: nondistended, nontender, bowel sounds present  Skin: dry, no rash no edema  Musculoskeletal: grossly normal muscle bulk and tone  Neuropsychiatric: normal affect    Recent Lab Results:  The following labs were reviewed today:    LIPID RESULTS:  Lab Results   Component Value Date    CHOL 105 01/24/2025    CHOL 217 (H) 08/13/2020    HDL 27 (L) 01/24/2025    HDL 37 (L) 08/13/2020    LDL 56 01/24/2025     (H) 08/13/2020    TRIG 111 01/24/2025    TRIG 244 (H) 08/13/2020    CHOLHDLRATIO 5.7 (H) 05/26/2015       LIVER ENZYME RESULTS:  Lab Results   Component Value Date    AST 32 01/07/2025    AST 20 08/13/2020    ALT 43 01/24/2025    ALT 49 08/13/2020       CBC RESULTS:  Lab Results   Component Value Date    WBC 10.5 02/27/2025    WBC 6.8 02/15/2019    RBC 5.33 02/27/2025    RBC 5.20 02/15/2019    HGB 16.4 02/27/2025    HGB 15.9 02/15/2019    HCT 45.7 02/27/2025    HCT 46.5 02/15/2019    MCV 86 02/27/2025    MCV 89 02/15/2019    MCH 30.8 02/27/2025    MCH 30.6 02/15/2019    MCHC 35.9 02/27/2025    MCHC 34.2 02/15/2019    RDW 11.8 02/27/2025    RDW 12.3 02/15/2019     02/27/2025     02/15/2019       BMP RESULTS:  Lab Results   Component Value Date     02/27/2025     08/13/2020    POTASSIUM 4.3 02/27/2025    POTASSIUM 3.9 08/25/2021    POTASSIUM 4.3 08/13/2020    CHLORIDE 102 02/27/2025    CHLORIDE 103 08/25/2021    CHLORIDE 103 08/13/2020    CO2 24 02/27/2025    CO2 26 08/25/2021    CO2 31 08/13/2020    ANIONGAP 12 02/27/2025    ANIONGAP 12 08/25/2021    ANIONGAP 4 08/13/2020    GLC 99  01/24/2025     (H) 12/06/2024    GLC 84 08/25/2021    GLC 89 08/13/2020    BUN 13.9 01/24/2025    BUN 13 08/25/2021    BUN 13 08/13/2020    CR 1.03 02/27/2025    CR 1.14 08/13/2020    GFRESTIMATED >90 02/27/2025    GFRESTIMATED 79 08/13/2020    GFRESTBLACK >90 08/13/2020    JACK 10.0 01/24/2025    JACK 9.3 08/13/2020        A1C RESULTS:  Lab Results   Component Value Date    A1C 5.1 12/05/2024    A1C 5.2 08/13/2020       INR RESULTS:  Lab Results   Component Value Date    INR 1.06 02/27/2025    INR 1.03 01/07/2025

## 2025-03-26 NOTE — LETTER
3/26/2025    [facility] [place]  [address]  [facility] [address]    RE: Chino ANDRESSA Samuel       Dear Colleague,     I had the pleasure of seeing Chino Samuel in the [facility].  [facility] Progress note    Assessment:  Percutaneous closure of patent foramen ovale with 30 mm [place] cardio form septal occluder 2/27/2025.  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 Improving. Plans to see audiology and ENT.   Dyslipidemia, on atorvastatin  obstructive sleep apnea, uses CPAP    Plan:  Trial of Metoprolol succinate 12.5mg PO daily. This can be increased or stopped as needed. Often ectopy improves within the first few months after device implantation.   One week ziopatch monitor, screen for atrial fibrillation  Continue aspirin indefinitely, clopidogrel for toal of 6 months  He was encouraged to increase his regular moderate activity   Follow up in 5 months with echo per protocol post PFO closure.     The longitudinal plan of care for the diagnosis(es)/condition(s) as documented were addressed during this visit. Due to the added complexity in care, I will continue to support Chino in the subsequent management and with ongoing continuity of care.      HPI:     Chino Samuel is a very nice 44-year-old male patient with history of CVA in December 2023 and percutaneous PFO closure on 2/27/2025 is here for follow-up.  He has unfortunately been having ongoing issues with vertigo which provokes severe episodes of nausea and vomiting.  Fortunately this has been improving. He has not had an episode for a week. He plans to see ENT and audiology.     Today he notes palpitations which started about one week after the procedure.  He notices his heart rate seems to increase easily. He has had fatigue since his stroke  and then he was fairly sedentary the first few weeks after the procedure, and last week tried to start exercising again.  He had an echo today which showed no residual shunt across the PFO occluder device.  He was having frequent PVCs during his echo and an EKG today shows sinus rhythm with sinus arrhythmia and frequent PACs.     He is wondering if he can have a beer when he goes to a [employer] game in a month.     Diagnostic studies reviewed for today's visit:  Echo today EF 60 to 65%, mild mitral regurgitation negative bubble study no pericardial effusion frequent PVCs.   Percutaneous closure of patent foramen ovale using a 30 mm [place] Cardioform Septal Occluder 2/27/2025  EKG today: Sinus rhythm with sinus arrhythmia and frequent PACs      CURRENT MEDICATIONS:  Current Outpatient Medications   Medication Sig Dispense Refill     allopurinol (ZYLOPRIM) 100 MG tablet Take 1 tablet (100 mg) by mouth daily. 90 tablet 3     aspirin 81 MG EC tablet Take 1 tablet (81 mg) by mouth daily. 30 tablet 0     atorvastatin (LIPITOR) 40 MG tablet Take 1 tablet (40 mg) by mouth every evening. 90 tablet 3     clopidogrel (PLAVIX) 75 MG tablet Take 1 tablet (75 mg) by mouth daily. For the day before your procedure ONLY (2/26) please take 300 mg (4 tabs). Then, beginning the day of your procedure (2/27), take 75 mg (1 tab) every day. 90 tablet 1     colchicine (COLCRYS) 0.6 MG tablet TAKE 1 TABLET(0.6 MG) BY MOUTH TWICE DAILY 60 tablet 3     fluticasone (FLONASE) 50 MCG/ACT nasal spray Spray 1 spray into both nostrils daily. (Patient taking differently: Spray 1 spray into both nostrils daily. PRN) 16 g 1   Also has PRN meclizine, dramamine, zofran at home    ALLERGIES     Allergies   Allergen Reactions     Indomethacin Other (See Comments)     PN: HA       PAST MEDICAL, SURGICAL, FAMILY, SOCIAL HISTORY:  History was reviewed and updated as needed, see medical record.  He does not drink any alcohol     REVIEW OF SYSTEMS:  Skin:         Eyes:       ENT:       Respiratory:    shortness of breath  Cardiovascular:    palpitations, fatigue  Gastroenterology:      Genitourinary:       Musculoskeletal:       Neurologic:       Psychiatric:       Heme/Lymph/Imm:       Endocrine:         PHYSICAL EXAM:      BP: 131/84 Pulse: 53   Resp: 16 SpO2: 98 %      Vital Signs with Ranges  Pulse:  [53] 53  Resp:  [16] 16  BP: (131)/(84) 131/84  SpO2:  [98 %] 98 %  210 lbs 14.4 oz    Constitutional: awake, alert, no distress  Eyes: sclera nonicteric  ENT: trachea midline  Respiratory: clear bilaterally  Cardiovascular: regular rate and rhythm, 2 ectopic beats during auscultation, no murmur  GI: nondistended, nontender, bowel sounds present  Skin: dry, no rash no edema  Musculoskeletal: grossly normal muscle bulk and tone  Neuropsychiatric: normal affect    Recent Lab Results:  The following labs were reviewed today:    LIPID RESULTS:  Lab Results   Component Value Date    CHOL 105 01/24/2025    CHOL 217 (H) 08/13/2020    HDL 27 (L) 01/24/2025    HDL 37 (L) 08/13/2020    LDL 56 01/24/2025     (H) 08/13/2020    TRIG 111 01/24/2025    TRIG 244 (H) 08/13/2020    CHOLHDLRATIO 5.7 (H) 05/26/2015       LIVER ENZYME RESULTS:  Lab Results   Component Value Date    AST 32 01/07/2025    AST 20 08/13/2020    ALT 43 01/24/2025    ALT 49 08/13/2020       CBC RESULTS:  Lab Results   Component Value Date    WBC 10.5 02/27/2025    WBC 6.8 02/15/2019    RBC 5.33 02/27/2025    RBC 5.20 02/15/2019    HGB 16.4 02/27/2025    HGB 15.9 02/15/2019    HCT 45.7 02/27/2025    HCT 46.5 02/15/2019    MCV 86 02/27/2025    MCV 89 02/15/2019    MCH 30.8 02/27/2025    MCH 30.6 02/15/2019    MCHC 35.9 02/27/2025    MCHC 34.2 02/15/2019    RDW 11.8 02/27/2025    RDW 12.3 02/15/2019     02/27/2025     02/15/2019       BMP RESULTS:  Lab Results   Component Value Date     02/27/2025     08/13/2020    POTASSIUM 4.3 02/27/2025    POTASSIUM 3.9 08/25/2021    POTASSIUM 4.3  08/13/2020    CHLORIDE 102 02/27/2025    CHLORIDE 103 08/25/2021    CHLORIDE 103 08/13/2020    CO2 24 02/27/2025    CO2 26 08/25/2021    CO2 31 08/13/2020    ANIONGAP 12 02/27/2025    ANIONGAP 12 08/25/2021    ANIONGAP 4 08/13/2020    GLC 99 01/24/2025     (H) 12/06/2024    GLC 84 08/25/2021    GLC 89 08/13/2020    BUN 13.9 01/24/2025    BUN 13 08/25/2021    BUN 13 08/13/2020    CR 1.03 02/27/2025    CR 1.14 08/13/2020    GFRESTIMATED >90 02/27/2025    GFRESTIMATED 79 08/13/2020    GFRESTBLACK >90 08/13/2020    JACK 10.0 01/24/2025    JACK 9.3 08/13/2020        A1C RESULTS:  Lab Results   Component Value Date    A1C 5.1 12/05/2024    A1C 5.2 08/13/2020       INR RESULTS:  Lab Results   Component Value Date    INR 1.06 02/27/2025    INR 1.03 01/07/2025                             Thank you for allowing me to participate in the care of your patient.      Sincerely,     Kait Leroy MD     [facility]  cc:   Kait Leroy MD  [address] [address]

## 2025-04-08 ENCOUNTER — OFFICE VISIT (OUTPATIENT)
Dept: OTOLARYNGOLOGY | Facility: CLINIC | Age: 45
End: 2025-04-08
Attending: FAMILY MEDICINE
Payer: COMMERCIAL

## 2025-04-08 ENCOUNTER — OFFICE VISIT (OUTPATIENT)
Dept: AUDIOLOGY | Facility: CLINIC | Age: 45
End: 2025-04-08
Attending: FAMILY MEDICINE
Payer: COMMERCIAL

## 2025-04-08 VITALS
SYSTOLIC BLOOD PRESSURE: 118 MMHG | TEMPERATURE: 97.6 F | WEIGHT: 210 LBS | HEIGHT: 70 IN | DIASTOLIC BLOOD PRESSURE: 66 MMHG | BODY MASS INDEX: 30.06 KG/M2

## 2025-04-08 DIAGNOSIS — H90.3 ASYMMETRICAL SENSORINEURAL HEARING LOSS: ICD-10-CM

## 2025-04-08 DIAGNOSIS — H90.42 SENSORINEURAL HEARING LOSS (SNHL) OF LEFT EAR WITH UNRESTRICTED HEARING OF RIGHT EAR: ICD-10-CM

## 2025-04-08 DIAGNOSIS — R42 DIZZINESS: Primary | ICD-10-CM

## 2025-04-08 DIAGNOSIS — H90.42 SENSORINEURAL HEARING LOSS (SNHL) OF LEFT EAR WITH UNRESTRICTED HEARING OF RIGHT EAR: Primary | ICD-10-CM

## 2025-04-08 PROCEDURE — 92550 TYMPANOMETRY & REFLEX THRESH: CPT | Performed by: AUDIOLOGIST

## 2025-04-08 PROCEDURE — 92557 COMPREHENSIVE HEARING TEST: CPT | Performed by: AUDIOLOGIST

## 2025-04-08 NOTE — LETTER
4/8/2025      Chino Samuel  [address] [facility] [address]      Dear Colleague,    Thank you for referring your patient, Chino Samuel, to the [facility]. Please see a copy of my visit note below.    Chief Complaint   Patient presents with     Consult     Left ear Hearing concerns, dizziness x 5 months     History of Present Illness  Chino Samuel is a 44 year old male who presents for evaluation. Referred by  for concerns of dizziness.     He has been seeing PT for concerns of BPPV. Last visit was on 03/19/25.     His last episode was the 3rd week in March 20225. Symptoms started mid November 2024. He took a flight for work and on the way home his left never popped and he developed dizziness symptoms.     The patient describes vertigo in which the entire room spins, or they spin, or there is a sense of motion. He feels nauseated. Then he will not have an episode for 1-2 weeks. The symptoms last for about a day. Symptoms have been present for November 2024. The patient  nausea and/or vomiting during episodes. He has also had an episode after waking up while using his CPAP. Symptoms are worsened by getting up from a seated/supine position or with head turn. Symptoms are provoked by wearing his CPAP mask and in the AM. Symptoms are improved by lorazepam, Zofran, Dramamine . The patient has noted left ear symptoms pressure/decrease, tinnitus. The patient denies otalgia, otorrhea. The patient reports exposure to recreational (concert, ), no , and no work-related noise exposure. The patient does havea history of migraines.     He has a history of a stroke.     Narrative    EXAM: CT HEAD WO CONTRAST  LOCATION: [facility]  DATE: 2/18/2025    INDICATION: vertifo  COMPARISON: CT head 12/21/2024  TECHNIQUE: Routine CT Head without IV contrast. Multiplanar reformats. Dose reduction techniques were used.    FINDINGS:  INTRACRANIAL CONTENTS: No intracranial  hemorrhage, extraaxial collection, or mass effect.  No CT evidence of acute infarct. Normal parenchymal attenuation. Unchanged mild generalized volume loss. No hydrocephalus.    VISUALIZED ORBITS/SINUSES/MASTOIDS: No intraorbital abnormality. No paranasal sinus mucosal disease. No middle ear or mastoid effusion.    BONES/SOFT TISSUES: No acute abnormality.  Exam End: 02/18/25  1:12 PM    Specimen Collected: 02/18/25 12:50 PM Last Resulted: 02/18/25  1:21 PM   Received From: [facility]      Impression    1.  Evolving subacute infarcts involving the left caudate tail and striatocapsular region.  2.  No new areas of ischemic change.    This report was electronically interpreted by: Scott Ross MD on 12/9/2024 9:08 AM CST  Narrative    EXAM: MR BRAIN W AND WO IV CONTRAST  LOCATION: [facility]  DATE: 12/9/2024    INDICATION: Vertigo.  COMPARISON: CT head without contrast 12/9/2024. MRI brain with and without contrast 12/5/2024.  CONTRAST: 9 mL Gadavist  TECHNIQUE: Routine multiplanar multisequence head MRI without and with intravenous contrast.    FINDINGS:  INTRACRANIAL CONTENTS: Evolving infarcts involving the left caudate tail and striatocapsular region with minimal associated enhancement, new from previous exam. No new infarct. No extra-axial fluid collection. No midline shift. Normal ventricles and sulci. Normal position of the cerebellar tonsils.    SELLA: No abnormality accounting for technique.    OSSEOUS STRUCTURES/SOFT TISSUES: Normal marrow signal. The major intracranial vascular flow voids are maintained.    ORBITS: No abnormality accounting for technique.    SINUSES/MASTOIDS: No paranasal sinus mucosal disease. No middle ear or mastoid effusion.  Exam End: 12/09/24  8:56 AM    Specimen Collected: 12/09/24  8:15 AM Last Resulted: 12/09/24  9:08 AM   Received From: [facility]  Result Received: 12/12/24  6:53 AM        Past Medical History  Patient Active Problem List   Diagnosis     Idiopathic chronic  gout of right foot without tophus     Onychomycosis     Hyperlipidemia LDL goal <70     Obesity, Class I, BMI 30-34.9     Family hx of prostate cancer     Snoring     Obstructive sleep apnea     CVA (cerebral vascular accident) (H)     Lupus anticoagulant positive     Patent foramen ovale with right to left shunt     Benign paroxysmal positional vertigo due to bilateral vestibular disorder     Current Medications    Current Outpatient Medications:      allopurinol (ZYLOPRIM) 100 MG tablet, Take 1 tablet (100 mg) by mouth daily., Disp: 90 tablet, Rfl: 3     aspirin 81 MG EC tablet, Take 1 tablet (81 mg) by mouth daily., Disp: 30 tablet, Rfl: 0     atorvastatin (LIPITOR) 40 MG tablet, Take 1 tablet (40 mg) by mouth every evening., Disp: 90 tablet, Rfl: 3     clopidogrel (PLAVIX) 75 MG tablet, Take 1 tablet (75 mg) by mouth daily. For the day before your procedure ONLY (2/26) please take 300 mg (4 tabs). Then, beginning the day of your procedure (2/27), take 75 mg (1 tab) every day., Disp: 90 tablet, Rfl: 1     colchicine (COLCRYS) 0.6 MG tablet, TAKE 1 TABLET(0.6 MG) BY MOUTH TWICE DAILY, Disp: 60 tablet, Rfl: 3     fluticasone (FLONASE) 50 MCG/ACT nasal spray, Spray 1 spray into both nostrils daily. (Patient taking differently: Spray 1 spray into both nostrils daily. PRN), Disp: 16 g, Rfl: 1     metoprolol succinate ER (TOPROL XL) 25 MG 24 hr tablet, Take 0.5 tablets (12.5 mg) by mouth daily., Disp: 45 tablet, Rfl: 3    Allergies   Allergies   Allergen Reactions     Indomethacin Other (See Comments)     PN: HA       Social History   Social History     Socioeconomic History     Marital status:      Spouse name: Lily     Number of children: 0     Years of education: 14     Highest education level: Associate degree: academic program   Occupational History     Occupation:      Employer: [employer]     Comment: [employer]   Tobacco Use     Smoking status: Former     Current packs/day: 0.00      Average packs/day: 0.5 packs/day for 3.6 years (1.8 ttl pk-yrs)     Types: Cigarettes     Start date: 12/3/1996     Quit date: 7/10/2000     Years since quittin.7     Passive exposure: Past     Smokeless tobacco: Never     Tobacco comments:     Brief cigarette usage   Vaping Use     Vaping status: Never Used   Substance and Sexual Activity     Alcohol use: Not Currently     Comment: Socially     Sexual activity: Yes     Partners: Female     Birth control/protection: Female Surgical     Comment: Spouse = Tubal Ligation   Other Topics Concern     Parent/sibling w/ CABG, MI or angioplasty before 65F 55M? No     Social Drivers of Health     Financial Resource Strain: Low Risk  (2024)    Financial Resource Strain      Within the past 12 months, have you or your family members you live with been unable to get utilities (heat, electricity) when it was really needed?: No   Food Insecurity: Low Risk  (2024)    Food Insecurity      Within the past 12 months, did you worry that your food would run out before you got money to buy more?: No      Within the past 12 months, did the food you bought just not last and you didn t have money to get more?: No   Transportation Needs: Low Risk  (2024)    Transportation Needs      Within the past 12 months, has lack of transportation kept you from medical appointments, getting your medicines, non-medical meetings or appointments, work, or from getting things that you need?: No   Physical Activity: Insufficiently Active (2024)    Exercise Vital Sign      Days of Exercise per Week: 5 days      Minutes of Exercise per Session: 20 min   Stress: No Stress Concern Present (2024)    [place] [employer] - Occupational Stress Questionnaire      Feeling of Stress : Only a little   Social Connections: Unknown (2024)    Social Connection and Isolation Panel [NHANES]      Frequency of Social Gatherings with Friends and Family: Once a week  "  Interpersonal Safety: Low Risk  (2/27/2025)    Interpersonal Safety      Do you feel physically and emotionally safe where you currently live?: Yes      Within the past 12 months, have you been hit, slapped, kicked or otherwise physically hurt by someone?: No      Within the past 12 months, have you been humiliated or emotionally abused in other ways by your partner or ex-partner?: No   Recent Concern: Interpersonal Safety - High Risk (12/12/2024)    Interpersonal Safety      Do you feel physically and emotionally safe where you currently live?: No      Within the past 12 months, have you been hit, slapped, kicked or otherwise physically hurt by someone?: No      Within the past 12 months, have you been humiliated or emotionally abused in other ways by your partner or ex-partner?: No   Housing Stability: Low Risk  (12/5/2024)    Housing Stability      Do you have housing? : Yes      Are you worried about losing your housing?: No   Recent Concern: Housing Stability - High Risk (9/30/2024)    Housing Stability      Do you have housing? : No      Are you worried about losing your housing?: No       Family History  Family History   Problem Relation Age of Onset     Diabetes Father      Hypertension Father      Cerebrovascular Disease Father      No Known Problems Mother      Cerebrovascular Disease Maternal Grandmother      No Known Problems Maternal Grandfather      No Known Problems Paternal Grandmother      No Known Problems Paternal Grandfather      No Known Problems Brother      No Known Problems Brother        Review of Systems  As per HPI and PMHx, otherwise 10+ comprehensive system review is negative.    Physical Exam  /66   Temp 97.6  F (36.4  C) (Temporal)   Ht 1.778 m (5' 10\")   Wt 95.3 kg (210 lb)   BMI 30.13 kg/m    GENERAL: The patient is a pleasant, cooperative 44 year old male in no acute distress.  HEAD: Normocephalic, atraumatic. Hair and scalp are normal.  EYES: Pupils are equal, round, " reactive to light and accommodation. Extraocular movements are intact. The sclera nonicteric without injection. The extraocular structures are normal.  EARS: Normal shape and symmetry. No tenderness when palpating the mastoid or tragal areas bilaterally. No mastoid erythema or fluctuance. Otoscopic exam on the right reveals no amount of cerumen. The right tympanic membrane is round, intact without evidence of effusion, good landmarks.  No retraction, granulation, or drainage. Otoscopic exam on the left reveals no amount of cerumen. The left tympanic membrane is round, intact without evidence of effusion, good landmarks.  No retraction, granulation, or drainage.  NOSE: Nares are patent.  Nasal mucosa is pink.  ORAL CAVITY: Lips are normal. Dentition is in good repair. Mucous membranes are moist. Tongue is mobile, protrudes to the midline.  Palate elevates symmetrically. Tonsils are +0. No erythema or exudate. No oral cavity or oropharyngeal masses, lesions, ulcerations, or leukoplakia.  NECK: Supple, trachea is midline. There is no palpable cervical lymphadenopathy or masses bilaterally. Palpation of the bilateral parotid and submandibular areas reveal no masses. No thyromegaly.    NEUROLOGIC: Cranial nerves II through XII are grossly intact. Voice is strong. Patient is House-Brackmann I/VI bilaterally.  Trigeminal nerve function in V1, V2, and V3 is equal and symmetric. Normal tandem gate, normal heal to toe. Finger-to-nose is normal. Absent Romberg, negative pronator drift.  CARDIOVASCULAR: Extremities are warm and well-perfused.  No significant peripheral edema.  RESPIRATORY: Patient has nonlabored breathing without cough, wheeze, stridor.  PSYCHIATRIC: Patient is alert and oriented. Mood and affect appear normal.  SKIN: Warm and dry. No scalp, face, or neck lesions noted.    Audiogram  The patient underwent an audiogram performed today. My review of the audiogram showed normal right ear and SNHL in the left ear.  Pure-tone average was 6 dB on the right and 53 dB on the left. Speech reception threshold was 0 dB on the right and 55 dB on the left. The patient had 100% word recognition on the right and 64% word recognition on the left. The patient had a A tympanogram on the right and a A tympanogram on the left.    Assessment and Plan     ICD-10-CM    1. Dizziness  R42 Adult ENT  Referral     Adult Audiology  Referral      2. Asymmetrical sensorineural hearing loss  H90.3 MR Brain w/o & w Contrast      3. Sensorineural hearing loss (SNHL) of left ear with unrestricted hearing of right ear  H90.42 MR Brain w/o & w Contrast        It was my pleasure seeing Chino Samuel today in clinic. The patient presents with dizziness. The patient's history, physical exam, and audiometric assessment are most consistent with vestibular migraine, however I would recommend that he completes balance (dizziness testing) to decipher if he is experiencing a central verses peripheral process. We will also get an updated MRI to rule out if there is anything growing on the left hearing nerve. If results are negative, the the hearing loss could be related to the stroke. I will follow up through [facility] with the results and next steps.     ANA Hurtado CNP  Otolaryngology  [place] [facility] [place]      Again, thank you for allowing me to participate in the care of your patient.        Sincerely,        ANA Hurtado CNP    Electronically signed

## 2025-04-08 NOTE — PROGRESS NOTES
Chief Complaint   Patient presents with    Consult     Left ear Hearing concerns, dizziness x 5 months     History of Present Illness  Chino Samuel is a 44 year old male who presents for evaluation. Referred by  for concerns of dizziness.     He has been seeing PT for concerns of BPPV. Last visit was on 03/19/25.     His last episode was the 3rd week in March 20225. Symptoms started mid November 2024. He took a flight for work and on the way home his left never popped and he developed dizziness symptoms.     The patient describes vertigo in which the entire room spins, or they spin, or there is a sense of motion. He feels nauseated. Then he will not have an episode for 1-2 weeks. The symptoms last for about a day. Symptoms have been present for November 2024. The patient  nausea and/or vomiting during episodes. He has also had an episode after waking up while using his CPAP. Symptoms are worsened by getting up from a seated/supine position or with head turn. Symptoms are provoked by wearing his CPAP mask and in the AM. Symptoms are improved by lorazepam, Zofran, Dramamine . The patient has noted left ear symptoms pressure/decrease, tinnitus. The patient denies otalgia, otorrhea. The patient reports exposure to recreational (concert, ), no , and no work-related noise exposure. The patient does havea history of migraines.     He has a history of a stroke.     Narrative    EXAM: CT HEAD WO CONTRAST  LOCATION: [facility]  DATE: 2/18/2025    INDICATION: vertifo  COMPARISON: CT head 12/21/2024  TECHNIQUE: Routine CT Head without IV contrast. Multiplanar reformats. Dose reduction techniques were used.    FINDINGS:  INTRACRANIAL CONTENTS: No intracranial hemorrhage, extraaxial collection, or mass effect.  No CT evidence of acute infarct. Normal parenchymal attenuation. Unchanged mild generalized volume loss. No hydrocephalus.    VISUALIZED ORBITS/SINUSES/MASTOIDS: No intraorbital  abnormality. No paranasal sinus mucosal disease. No middle ear or mastoid effusion.    BONES/SOFT TISSUES: No acute abnormality.  Exam End: 02/18/25  1:12 PM    Specimen Collected: 02/18/25 12:50 PM Last Resulted: 02/18/25  1:21 PM   Received From: [facility]      Impression    1.  Evolving subacute infarcts involving the left caudate tail and striatocapsular region.  2.  No new areas of ischemic change.    This report was electronically interpreted by: Scott Ross MD on 12/9/2024 9:08 AM CST  Narrative    EXAM: MR BRAIN W AND WO IV CONTRAST  LOCATION: [facility]  DATE: 12/9/2024    INDICATION: Vertigo.  COMPARISON: CT head without contrast 12/9/2024. MRI brain with and without contrast 12/5/2024.  CONTRAST: 9 mL Gadavist  TECHNIQUE: Routine multiplanar multisequence head MRI without and with intravenous contrast.    FINDINGS:  INTRACRANIAL CONTENTS: Evolving infarcts involving the left caudate tail and striatocapsular region with minimal associated enhancement, new from previous exam. No new infarct. No extra-axial fluid collection. No midline shift. Normal ventricles and sulci. Normal position of the cerebellar tonsils.    SELLA: No abnormality accounting for technique.    OSSEOUS STRUCTURES/SOFT TISSUES: Normal marrow signal. The major intracranial vascular flow voids are maintained.    ORBITS: No abnormality accounting for technique.    SINUSES/MASTOIDS: No paranasal sinus mucosal disease. No middle ear or mastoid effusion.  Exam End: 12/09/24  8:56 AM    Specimen Collected: 12/09/24  8:15 AM Last Resulted: 12/09/24  9:08 AM   Received From: [facility]  Result Received: 12/12/24  6:53 AM        Past Medical History  Patient Active Problem List   Diagnosis    Idiopathic chronic gout of right foot without tophus    Onychomycosis    Hyperlipidemia LDL goal <70    Obesity, Class I, BMI 30-34.9    Family hx of prostate cancer    Snoring    Obstructive sleep apnea    CVA (cerebral vascular accident) (H)    Lupus  anticoagulant positive    Patent foramen ovale with right to left shunt    Benign paroxysmal positional vertigo due to bilateral vestibular disorder     Current Medications    Current Outpatient Medications:     allopurinol (ZYLOPRIM) 100 MG tablet, Take 1 tablet (100 mg) by mouth daily., Disp: 90 tablet, Rfl: 3    aspirin 81 MG EC tablet, Take 1 tablet (81 mg) by mouth daily., Disp: 30 tablet, Rfl: 0    atorvastatin (LIPITOR) 40 MG tablet, Take 1 tablet (40 mg) by mouth every evening., Disp: 90 tablet, Rfl: 3    clopidogrel (PLAVIX) 75 MG tablet, Take 1 tablet (75 mg) by mouth daily. For the day before your procedure ONLY () please take 300 mg (4 tabs). Then, beginning the day of your procedure (), take 75 mg (1 tab) every day., Disp: 90 tablet, Rfl: 1    colchicine (COLCRYS) 0.6 MG tablet, TAKE 1 TABLET(0.6 MG) BY MOUTH TWICE DAILY, Disp: 60 tablet, Rfl: 3    fluticasone (FLONASE) 50 MCG/ACT nasal spray, Spray 1 spray into both nostrils daily. (Patient taking differently: Spray 1 spray into both nostrils daily. PRN), Disp: 16 g, Rfl: 1    metoprolol succinate ER (TOPROL XL) 25 MG 24 hr tablet, Take 0.5 tablets (12.5 mg) by mouth daily., Disp: 45 tablet, Rfl: 3    Allergies   Allergies   Allergen Reactions    Indomethacin Other (See Comments)     PN: HA       Social History   Social History     Socioeconomic History    Marital status:      Spouse name: Lily    Number of children: 0    Years of education: 14    Highest education level: Associate degree: academic program   Occupational History    Occupation:      Employer: [employer]     Comment: [employer]   Tobacco Use    Smoking status: Former     Current packs/day: 0.00     Average packs/day: 0.5 packs/day for 3.6 years (1.8 ttl pk-yrs)     Types: Cigarettes     Start date: 12/3/1996     Quit date: 7/10/2000     Years since quittin.7     Passive exposure: Past    Smokeless tobacco: Never    Tobacco comments:     Brief  cigarette usage   Vaping Use    Vaping status: Never Used   Substance and Sexual Activity    Alcohol use: Not Currently     Comment: Socially    Sexual activity: Yes     Partners: Female     Birth control/protection: Female Surgical     Comment: Spouse = Tubal Ligation   Other Topics Concern    Parent/sibling w/ CABG, MI or angioplasty before 65F 55M? No     Social Drivers of Health     Financial Resource Strain: Low Risk  (12/5/2024)    Financial Resource Strain     Within the past 12 months, have you or your family members you live with been unable to get utilities (heat, electricity) when it was really needed?: No   Food Insecurity: Low Risk  (12/5/2024)    Food Insecurity     Within the past 12 months, did you worry that your food would run out before you got money to buy more?: No     Within the past 12 months, did the food you bought just not last and you didn t have money to get more?: No   Transportation Needs: Low Risk  (12/5/2024)    Transportation Needs     Within the past 12 months, has lack of transportation kept you from medical appointments, getting your medicines, non-medical meetings or appointments, work, or from getting things that you need?: No   Physical Activity: Insufficiently Active (9/30/2024)    Exercise Vital Sign     Days of Exercise per Week: 5 days     Minutes of Exercise per Session: 20 min   Stress: No Stress Concern Present (9/30/2024)    [employer] [place] [employer] - Occupational Stress Questionnaire     Feeling of Stress : Only a little   Social Connections: Unknown (9/30/2024)    Social Connection and Isolation Panel [NHANES]     Frequency of Social Gatherings with Friends and Family: Once a week   Interpersonal Safety: Low Risk  (2/27/2025)    Interpersonal Safety     Do you feel physically and emotionally safe where you currently live?: Yes     Within the past 12 months, have you been hit, slapped, kicked or otherwise physically hurt by someone?: No     Within the  "past 12 months, have you been humiliated or emotionally abused in other ways by your partner or ex-partner?: No   Recent Concern: Interpersonal Safety - High Risk (12/12/2024)    Interpersonal Safety     Do you feel physically and emotionally safe where you currently live?: No     Within the past 12 months, have you been hit, slapped, kicked or otherwise physically hurt by someone?: No     Within the past 12 months, have you been humiliated or emotionally abused in other ways by your partner or ex-partner?: No   Housing Stability: Low Risk  (12/5/2024)    Housing Stability     Do you have housing? : Yes     Are you worried about losing your housing?: No   Recent Concern: Housing Stability - High Risk (9/30/2024)    Housing Stability     Do you have housing? : No     Are you worried about losing your housing?: No       Family History  Family History   Problem Relation Age of Onset    Diabetes Father     Hypertension Father     Cerebrovascular Disease Father     No Known Problems Mother     Cerebrovascular Disease Maternal Grandmother     No Known Problems Maternal Grandfather     No Known Problems Paternal Grandmother     No Known Problems Paternal Grandfather     No Known Problems Brother     No Known Problems Brother        Review of Systems  As per HPI and PMHx, otherwise 10+ comprehensive system review is negative.    Physical Exam  /66   Temp 97.6  F (36.4  C) (Temporal)   Ht 1.778 m (5' 10\")   Wt 95.3 kg (210 lb)   BMI 30.13 kg/m    GENERAL: The patient is a pleasant, cooperative 44 year old male in no acute distress.  HEAD: Normocephalic, atraumatic. Hair and scalp are normal.  EYES: Pupils are equal, round, reactive to light and accommodation. Extraocular movements are intact. The sclera nonicteric without injection. The extraocular structures are normal.  EARS: Normal shape and symmetry. No tenderness when palpating the mastoid or tragal areas bilaterally. No mastoid erythema or fluctuance. " Otoscopic exam on the right reveals no amount of cerumen. The right tympanic membrane is round, intact without evidence of effusion, good landmarks.  No retraction, granulation, or drainage. Otoscopic exam on the left reveals no amount of cerumen. The left tympanic membrane is round, intact without evidence of effusion, good landmarks.  No retraction, granulation, or drainage.  NOSE: Nares are patent.  Nasal mucosa is pink.  ORAL CAVITY: Lips are normal. Dentition is in good repair. Mucous membranes are moist. Tongue is mobile, protrudes to the midline.  Palate elevates symmetrically. Tonsils are +0. No erythema or exudate. No oral cavity or oropharyngeal masses, lesions, ulcerations, or leukoplakia.  NECK: Supple, trachea is midline. There is no palpable cervical lymphadenopathy or masses bilaterally. Palpation of the bilateral parotid and submandibular areas reveal no masses. No thyromegaly.    NEUROLOGIC: Cranial nerves II through XII are grossly intact. Voice is strong. Patient is House-Brackmann I/VI bilaterally.  Trigeminal nerve function in V1, V2, and V3 is equal and symmetric. Normal tandem gate, normal heal to toe. Finger-to-nose is normal. Absent Romberg, negative pronator drift.  CARDIOVASCULAR: Extremities are warm and well-perfused.  No significant peripheral edema.  RESPIRATORY: Patient has nonlabored breathing without cough, wheeze, stridor.  PSYCHIATRIC: Patient is alert and oriented. Mood and affect appear normal.  SKIN: Warm and dry. No scalp, face, or neck lesions noted.    Audiogram  The patient underwent an audiogram performed today. My review of the audiogram showed normal right ear and SNHL in the left ear. Pure-tone average was 6 dB on the right and 53 dB on the left. Speech reception threshold was 0 dB on the right and 55 dB on the left. The patient had 100% word recognition on the right and 64% word recognition on the left. The patient had a A tympanogram on the right and a A tympanogram  on the left.    Assessment and Plan     ICD-10-CM    1. Dizziness  R42 Adult ENT  Referral     Adult Audiology  Referral      2. Asymmetrical sensorineural hearing loss  H90.3 MR Brain w/o & w Contrast      3. Sensorineural hearing loss (SNHL) of left ear with unrestricted hearing of right ear  H90.42 MR Brain w/o & w Contrast        It was my pleasure seeing Chino Samuel today in clinic. The patient presents with dizziness. The patient's history, physical exam, and audiometric assessment are most consistent with vestibular migraine, however I would recommend that he completes balance (dizziness testing) to decipher if he is experiencing a central verses peripheral process. We will also get an updated MRI to rule out if there is anything growing on the left hearing nerve. If results are negative, the the hearing loss could be related to the stroke. I will follow up through [facility] with the results and next steps.     ANA Hurtado [facility]  Otolaryngology  [facility]

## 2025-04-08 NOTE — PATIENT INSTRUCTIONS
You were seen by Raciel Chapman CNP.  If you have questions or concerns regarding your appointment today, you can reach out to our call center at [phone].  The following has been recommended at your appointment today:    Let's schedule additional dizziness testing to see what could be causing your symptoms. Scheduling will call to discuss further with you.   I will reach out to you via [employer] or call you with results and next steps.     One diagnosis I feel that fits your symptoms is vestibular migraines. Please read through the information below and let me know if you have any questions or concerns.       The following has been recommended at your appointment today:    - Magnesium 400mg  - Riboflavin (B2) 400mg  - CoQ10 100-300mg  - Vitamin D [id number] international unit(s)  - Check out Heal Your Headache book on additional migraine triggers  - Vestibular therapy    Vestibular Migraine:some general information      Migraine headache is one of the more common neurological conditions. In fact it is so common that some people don t even consider it a disease, but rather a normal variant of the human condition. Although the word migraine conjures up the image of extremely painful headaches, migraine often involves much more than headache. In fact, at times the headache may be minor, or may not even exist.  Many people who experience migraine are familiar with visual aura when, just before the headache, one sees flickering light spots sometimes with dark holes inside or zigzagged light flashes like lightening. These visual phenomenon tend to appear about 15 minutes before the headache.  There are hearing and vestibular  (balance) phenomena also.  Your brain and body are both involved in migraine phenomenon: the sensory stimuli causes extreme sensitivity, and you receive and process the stimuli in a constant migraine physiologic state. Migraines can also have ocular and vestibular manifestations.  The symptoms may  be most consistent with this increased sensory awareness state or sensory hypersensitivity state, characteristic of migraine exacerbation.   For the time being, the strong recommendation would be that we work to mitigate the hypersensitivity state.   A migraine prevention strategy which could improve any vestibular migraine that is experienced is recommended, either through your own neurologist or by setting up a migraine management consult here through Neurology clinic at the [facility], with Arely Merida CNP.    Your migraine isn't always a headache, it is more of a setpoint of migraine phenomena. Managing migraine can make quite a bit of difference in symptoms of vestibular migraine.  Women with migraines can certainly note an uptick in symptoms/ migraine phenomena related to hormonal changes of menopause, which decreases as the hormones become less fluctuant.     In regards to recovery from balance testing, migraineurs usually experience a setback/dizziness symptoms  for about a week after the vestibular testing and then the symptoms samia generally quickly after that. This is within usual expectations.    Classic Symptoms of Migraine  Severe throbbing headache on one side of the head   Nausea and vomiting   Extreme sensitivity to light and noise   The need to go to a dark, quiet room to sleep  While these symptoms may seem common to migraine sufferers, many people don t realize there is also an association between migraine and the inner ear and brain mechanisms that influence hearing and balance.  Vestibular Symptoms  Vertigo   Imbalance   Dizziness   Unsteadiness   Extreme sensitivity to motion  Hearing Symptoms  Muffled hearing   Ear fullness   Tinnitus or ringing in the ear  The hearing symptoms are associated with Otic Migraine, from the word meaning ear, and often occur without headaches.  The cause of migraine headaches probably relates to both abnormal discharges in cells within the brain and to the  constriction of the walls of the blood vessels in and around the brain. Although we don t completely understand the causes of migraine, we know that it can be provoked or made worse by a number of factors, including:  Hypoglycemia (low blood sugar)   Stress and altered sleep patterns   Diet   Chocolate, red wine   Coffee, sodas with caffeine   Cheeses, Monosodium glutamate (MSG)    HEARING:     You do have a sensorineural hearing loss on the left, which means the hair cells are damaged.   This could have occurred when you had or stroke.   However, looking at the word recognition on your hearing test we should rule out any abnormal growth growing on the hearing nerve.   Schedule MRI and I will call or send a mychart with results.   If imaging is clear, then the next step would be to discuss hearing aids.     HEARING LOSS EDUCATION:   Conductive hearing loss occurs when sound conduction is impeded through the external ear, the middle ear, or both. Sensorineural hearing loss (SNHL) occurs when there is a problem within the cochlea or the neural pathway to the auditory cortex. Mixed hearing loss is concomitant conductive and sensorineural loss.    CONDUCTIVE:   Conductive leslie loss involves the external or middle ear.   Hearing loss occurs when sound can't pass through your outer or middle ear.   Something is blocking the sound from getting through the path way of sound.     SENSORINEURAL HEARING LOSS:   Involves the connection between your ear and nervous system.  Leads to problems converting sound vibrations to neural signals (electrical signals) that the brain can't interpret.   Hearing damage.   Hair cells in the cochlear are damaged and laying flat.     MIXED HEARING LOSS:   Mixture of conductive and SNHL.

## 2025-04-08 NOTE — PROGRESS NOTES
AUDIOLOGY REPORT    SUMMARY: Audiology visit completed. See audiogram for results.    RECOMMENDATIONS: Follow-up with ENT.    [place]  Doctor of Audiology  [place] License # [id number]

## 2025-04-10 ENCOUNTER — MYC MEDICAL ADVICE (OUTPATIENT)
Dept: OTOLARYNGOLOGY | Facility: CLINIC | Age: 45
End: 2025-04-10
Payer: COMMERCIAL

## 2025-04-10 DIAGNOSIS — Z86.59 HISTORY OF CLAUSTROPHOBIA: Primary | ICD-10-CM

## 2025-04-10 RX ORDER — DIAZEPAM 5 MG/1
5 TABLET ORAL ONCE
Qty: 2 TABLET | Refills: 0 | Status: SHIPPED | OUTPATIENT
Start: 2025-04-10 | End: 2025-04-10

## 2025-04-13 ENCOUNTER — MYC MEDICAL ADVICE (OUTPATIENT)
Dept: OTOLARYNGOLOGY | Facility: CLINIC | Age: 45
End: 2025-04-13
Payer: COMMERCIAL

## 2025-04-16 LAB — CV ZIO PRELIM RESULTS: NORMAL

## 2025-04-17 ENCOUNTER — HOSPITAL ENCOUNTER (OUTPATIENT)
Dept: MRI IMAGING | Facility: HOSPITAL | Age: 45
Discharge: HOME | End: 2025-04-17
Payer: COMMERCIAL

## 2025-04-17 DIAGNOSIS — H90.3 ASYMMETRICAL SENSORINEURAL HEARING LOSS: ICD-10-CM

## 2025-04-17 DIAGNOSIS — H90.42 SENSORINEURAL HEARING LOSS (SNHL) OF LEFT EAR WITH UNRESTRICTED HEARING OF RIGHT EAR: ICD-10-CM

## 2025-04-17 PROCEDURE — 255N000002 HC RX 255 OP 636

## 2025-04-17 PROCEDURE — 70553 MRI BRAIN STEM W/O & W/DYE: CPT

## 2025-04-17 PROCEDURE — A9585 GADOBUTROL INJECTION: HCPCS

## 2025-04-17 RX ORDER — GADOBUTROL 604.72 MG/ML
9 INJECTION INTRAVENOUS ONCE
Status: COMPLETED | OUTPATIENT
Start: 2025-04-17 | End: 2025-04-17

## 2025-04-17 RX ADMIN — GADOBUTROL 9 ML: 604.72 INJECTION INTRAVENOUS at 19:14

## 2025-05-05 ENCOUNTER — MYC MEDICAL ADVICE (OUTPATIENT)
Dept: CARDIOLOGY | Facility: CLINIC | Age: 45
End: 2025-05-05
Payer: COMMERCIAL

## 2025-05-05 DIAGNOSIS — Q21.12 PFO (PATENT FORAMEN OVALE): Primary | ICD-10-CM

## 2025-05-05 DIAGNOSIS — I48.91 ATRIAL FIBRILLATION (H): ICD-10-CM

## 2025-05-07 ENCOUNTER — ORDERS ONLY (AUTO-RELEASED) (OUTPATIENT)
Dept: CARDIOLOGY | Facility: CLINIC | Age: 45
End: 2025-05-07

## 2025-05-07 DIAGNOSIS — I48.91 ATRIAL FIBRILLATION (H): ICD-10-CM

## 2025-05-07 DIAGNOSIS — Q21.12 PFO (PATENT FORAMEN OVALE): ICD-10-CM

## 2025-05-28 LAB — CV ZIO PRELIM RESULTS: NORMAL

## 2025-05-29 ENCOUNTER — MYC MEDICAL ADVICE (OUTPATIENT)
Dept: CARDIOLOGY | Facility: CLINIC | Age: 45
End: 2025-05-29
Payer: COMMERCIAL

## 2025-05-29 ENCOUNTER — RESULTS FOLLOW-UP (OUTPATIENT)
Dept: NEUROLOGY | Facility: CLINIC | Age: 45
End: 2025-05-29

## 2025-05-30 ENCOUNTER — RESULTS FOLLOW-UP (OUTPATIENT)
Dept: CARDIOLOGY | Facility: CLINIC | Age: 45
End: 2025-05-30

## 2025-06-03 PROBLEM — H81.13 BENIGN PAROXYSMAL POSITIONAL VERTIGO DUE TO BILATERAL VESTIBULAR DISORDER: Status: RESOLVED | Noted: 2025-02-24 | Resolved: 2025-06-03

## 2025-06-06 NOTE — PROGRESS NOTES
AUDIOLOGY REPORT-BALANCE ASSESSMENT    SUBJECTIVE: Chino Samuel, 44 year old, was seen in Audiology at the [facility] on 6/11/2025, for videonystagmography (VNG) and rotational chair testing, referred by ANA Hurtado CNP.     Patient presents with chief complaints of dizziness/vertigo. Patient reports symptoms onset with sudden left ear hearing loss in late November\early December. Shortly after, patient reports he was darting his eyes between his two computer screens when he suddenly experienced room spinning dizziness/vertigo and nausea prompting him to seek evaluation at the ED. Patient was found to have had a stroke. Since this time, patient reports episodic violent bouts of room spinning dizziness\vertigo accompanied by low humming tinnitus and blurred vision. Episodes last 6 hours up to 10-12 hours in duration. Patient describes his episodes as significantly debilitating. He reports waking up 5-6 times with vertigo in the middle of the night. Patient reportedly uses a CPAP machine at night and notes increased ear pressure during those episodes. Patient denies symptoms provoked by rolling over in bed, looking up, bending down, with fast head or body movements, or with busy visual environments (grocery store, busy patterns, etc.). Patient denies drifting to either side while walking. He denies feeling pulled to one side during episodes. Patient denies any falls to date.    Since his sudden left hearing loss, patient reports his left ear constantly feels full. He notes daily ear popping. His most recent hearing evaluation performed 4/8/2025 revealed normal hearing in the right ear and asymmetric moderate to moderately severe sensorineural hearing loss in the left ear. Patient denies fluctuations in his hearing. He denies ear pain, drainage, and previous ear surgeries.     Patient reports history of migraine headaches which occur once a month. Headaches are localized to his  forehead and sometimes behind his eyes. Patient describes his headaches as both a pain and pressure. He denies visual changes or aura with them. Patient reports sensitivity to lights with headaches. Patient denies sensitivity to loud sounds or dizziness provoked by loud sounds. Patient denies motion intolerance while in the car. Patient denies history of head injuries, concussions, cancer or chemotherapy. Patient denies dizziness provoked by coughing, sneezing, blowing his nose, lifting heavy items, or bearing down. Patient denies autophony (eye blinks). Patient denies persistent motion post cessation of motion or motion while still (rocking or swaying). Family history is positive for cancer (father), and Ménière's disease (mother-diagnosed around age 60). Family history is negative for migraines or other types of dizziness. Patient denies consumption of caffeinated beverages, nicotine, alcoholic substances, or use of medications with known vestibular interactions within the past 48 hours.    OBJECTIVE:  Falls Risk Screening  Falls Risk Completed by: Audiology  Have you fallen 2 or more times in the past year? No  Have you fallen and had an injury in the past year? No  Is the patient receiving Physical Therapy services? No  Fall Screen Comments:     Videonystagmography (VNG) testing:  Prescreening:  Tympanograms: Normal eardrum mobility bilaterally. Note: this test is completed to determine the status of the middle ear before irrigations are completed. *Patient denies dizziness with tympanometry.  Ocular range of motion and ocular counter roll: Normal   Cross/cover: Normal  Head Thrust: Negative    Nystagmus Tests:  Gaze-Horizontal with fixation:   Center: Normal   Right: Normal   Left: Normal  Gaze-Vertical with fixation:   Up: Normal   Down: Normal  Gaze with fixation denied   Center: Spontaneous 1 degree/s down beating nystagmus.   Right: 1 degree/s down beating nystagmus.   Left: Normal   Up: Normal  High  Frequency Headshake:   Horizontal: Positive. 3 degrees/s left beating nystagmus, no symptoms.   Vertical: Positive. 3 degrees/s right beating nystagmus, no symptoms.    [place]-Hallpike Head Right: Negative for PC BPPV. No consistent nystagmus, no symptoms.  [place]-Hallpike Head Left: Negative for PC BPPV. No consistent nystagmus, no symptoms.  Roll Test Head Right: Negative for HC BPPV. No consistent nystagmus, no symptoms.  Roll Test Head Left: Negative for HC BPPV. No consistent nystagmus, no symptoms.    Positional Testing:  Positionals: Supine: 1-2 degrees/s left beating nystagmus, suppresses with fixation, no symptoms.    Positionals: Body Right: No consistent nystagmus, no symptoms.  Positionals: Body Left: Few 1-2 degrees/s left beats, suppresses with fixation, no symptoms.  Positionals: Pre-Caloric: Intermittent 1-2 degrees/s left beating nystagmus mixed with saccadic intrusions, suppresses the fixation, no symptoms.    Oculomotor Tests:  Saccades: Normal  Pursuit: Normal    Calorics:  (Tested at 44 degrees and 30 degrees Celsius for 30 seconds for warm and cool water, respectively):  Right Warm Eye Speed: 18 degrees per second right beating  Left Warm Eye Speed: 15 degrees per second left beating  Right Cool Eye Speed: 11 degrees per second left beating  Left Cool Eye Speed: 7 degrees per second right beating  Difference between ear: 14% left hypofunction. (Greater than 25% considered clinically significant.)  Fixation Index: Normal  Overall caloric test: Normal    Post Irrigations Otoscopy: Normal     ASSESSMENT:  1. Indications of central vestibular system involvement noted on today's exam were as follows:   -Mild spontaneous down beating nystagmus evident in 2 of 4 seated upright positions.    2. Indications of possible peripheral vestibular system involvement noted on today's exam are as follows:  -Positive High Frequency Headshakes.   -Mild left beating nystagmus evident in 3 of 4  Positionals.      PLAN:  Proceed with vestibular evoked myogenic potential (VEMP) and electrocochleography (ECochG) testing as scheduled. Follow-up with ANA Hurtado CNP Regarding today's results and for medical management. Please call this clinic at [phone] with questions regarding these results or recommendations.       Fadia Lao. CCC-A  Vestibular Audiologist   [place] #[id number]

## 2025-06-11 ENCOUNTER — OFFICE VISIT (OUTPATIENT)
Dept: AUDIOLOGY | Facility: CLINIC | Age: 45
End: 2025-06-11
Payer: COMMERCIAL

## 2025-06-11 DIAGNOSIS — R42 DIZZINESS: ICD-10-CM

## 2025-06-11 DIAGNOSIS — H90.5 SENSORINEURAL HEARING LOSS OF LEFT EAR: ICD-10-CM

## 2025-06-11 DIAGNOSIS — R42 DIZZINESS: Primary | ICD-10-CM

## 2025-06-11 DIAGNOSIS — R42 VERTIGO: Primary | ICD-10-CM

## 2025-06-11 NOTE — PROGRESS NOTES
AUDIOLOGY REPORT    SUBJECTIVE: Chino Samuel was seen in the Audiology Clinic at the [facility] [place] on 6/11/2025 for a vestibular evoked myogenic potential (VEMP) evaluation, referred by  AAN Hurtdao CNP.  The patient was seen for VNG and rotational chair testing this morning. The following case history was taken at this time by Mega Lao :     Patient presents with chief complaints of dizziness/vertigo. Patient reports symptoms onset with sudden left ear hearing loss in late November\early December. Shortly after, patient reports he was darting his eyes between his two computer screens when he suddenly experienced room spinning dizziness/vertigo and nausea prompting him to seek evaluation at the ED. Patient was found to have had a stroke. Since this time, patient reports episodic violent bouts of room spinning dizziness\vertigo accompanied by low humming tinnitus and blurred vision. Episodes last 6 hours up to 10-12 hours in duration. Patient describes his episodes as significantly debilitating. He reports waking up 5-6 times with vertigo in the middle of the night. Patient reportedly uses a CPAP machine at night and notes increased ear pressure during those episodes. Patient denies symptoms provoked by rolling over in bed, looking up, bending down, with fast head or body movements, or with busy visual environments (grocery store, busy patterns, etc.). Patient denies drifting to either side while walking. He denies feeling pulled to one side during episodes. Patient denies any falls to date.     Since his sudden left hearing loss, patient reports his left ear constantly feels full. He notes daily ear popping. His most recent hearing evaluation performed 4/8/2025 revealed normal hearing in the right ear and asymmetric moderate to moderately severe sensorineural hearing loss in the left ear. Patient denies fluctuations in his hearing. He denies ear  pain, drainage, and previous ear surgeries.      Patient reports history of migraine headaches which occur once a month. Headaches are localized to his forehead and sometimes behind his eyes. Patient describes his headaches as both a pain and pressure. He denies visual changes or aura with them. Patient reports sensitivity to lights with headaches. Patient denies sensitivity to loud sounds or dizziness provoked by loud sounds. Patient denies motion intolerance while in the car. Patient denies history of head injuries, concussions, cancer or chemotherapy. Patient denies dizziness provoked by coughing, sneezing, blowing his nose, lifting heavy items, or bearing down. Patient denies autophony (eye blinks). Patient denies persistent motion post cessation of motion or motion while still (rocking or swaying). Family history is positive for cancer (father), and Ménière's disease (mother-diagnosed around age 60). Family history is negative for migraines or other types of dizziness. Patient denies consumption of caffeinated beverages, nicotine, alcoholic substances, or use of medications with known vestibular interactions within the past 48 hours.    OBJECTIVE:   Falls Risk Screening  Falls Risk Completed by: Audiology   Have you fallen 2 or more times in the past year? No  Have you fallen and had an injury in the past year? No  Is the patient receiving Physical Therapy services? No  Fall Screen Comments:      VEMP testing is performed using an auditory evoked potentials system. Surface electrodes are placed in several locations on the patient's head and neck in order to record muscle motoneuron activity in response to loud auditory stimuli. This testing assesses very specific vestibular pathways in the inner ear and central nervous system. cVEMP testing is performed with electrodes placed on the patient's sternocleidomastoid muscle, and is used to assess the saccular organ, afferent inferior vestibular nerve and vestibular  nuclei within the brainstem. oVEMP testing is performed with electrodes placed under the patient's eyes, and is used to assess the utricle and afferent superior vestibular nerve and nuclei within the brainstem.  Patients that may benefit from this procedure are those with complaints of vertigo and imbalance or those suspected of superior canal dehiscence. A total of 90 minutes was spent completing the VEMP testing today.    Tympanograms (completed during VNG)      RIGHT: normal eardrum mobility      LEFT: normal eardrum mobility     cVEMP Thresholds via 500 Hz toneburst:    RIGHT: 85 dB nHL which  suggest normal saccular and inferior vestibular nerve function    LEFT: 80 dB nHL which  suggest normal saccular and inferior vestibular nerve function    AMPLITUDE ASYMMETRY: 8%, WNL (greater than 33% is considered abnormal)    oVEMP Thresholds via 500 Hz toneburst:     RIGHT: 90 dB nHL which suggests normal utricle and superior vestibular nerve function    LEFT: 97 dB nHL which suggests normal utricle and superior vestibular nerve function    AMPLITUDE ASYMMETRY: 12%, WNL (greater than 33% is considered abnormal)    ASSESSMENT: Today's results suggest a normal VEMP evaluation. These results suggest normal saccular and inferior vestibular nerve function and normal utricle and superior vestibular nerve function.      PLAN:The patient will follow up with  ANA Hurtado CNP. for medical management.     Please call this clinic with questions regarding these results or recommendations.      Fadia Asencio. CCC-A  Licensed Audiologist   [place] #[id number]

## 2025-06-11 NOTE — PROGRESS NOTES
AUDIOLOGY REPORT    BACKGROUND INFORMATION: Chino Samuel was seen in Audiology at the [facility] on 6/11/2025 for an electrocochleography (ECochG) evaluation, referred by referred by ANA Hurtado CNP..  The patient was seen for VNG and rotational chair testing this morning. The following case history was taken at this time by Mega Lao :    Patient presents with chief complaints of dizziness/vertigo. Patient reports symptoms onset with sudden left ear hearing loss in late November\early December. Shortly after, patient reports he was darting his eyes between his two computer screens when he suddenly experienced room spinning dizziness/vertigo and nausea prompting him to seek evaluation at the ED. Patient was found to have had a stroke. Since this time, patient reports episodic violent bouts of room spinning dizziness\vertigo accompanied by low humming tinnitus and blurred vision. Episodes last 6 hours up to 10-12 hours in duration. Patient describes his episodes as significantly debilitating. He reports waking up 5-6 times with vertigo in the middle of the night. Patient reportedly uses a CPAP machine at night and notes increased ear pressure during those episodes. Patient denies symptoms provoked by rolling over in bed, looking up, bending down, with fast head or body movements, or with busy visual environments (grocery store, busy patterns, etc.). Patient denies drifting to either side while walking. He denies feeling pulled to one side during episodes. Patient denies any falls to date.     Since his sudden left hearing loss, patient reports his left ear constantly feels full. He notes daily ear popping. His most recent hearing evaluation performed 4/8/2025 revealed normal hearing in the right ear and asymmetric moderate to moderately severe sensorineural hearing loss in the left ear. Patient denies fluctuations in his hearing. He denies ear  pain, drainage, and previous ear surgeries.      Patient reports history of migraine headaches which occur once a month. Headaches are localized to his forehead and sometimes behind his eyes. Patient describes his headaches as both a pain and pressure. He denies visual changes or aura with them. Patient reports sensitivity to lights with headaches. Patient denies sensitivity to loud sounds or dizziness provoked by loud sounds. Patient denies motion intolerance while in the car. Patient denies history of head injuries, concussions, cancer or chemotherapy. Patient denies dizziness provoked by coughing, sneezing, blowing his nose, lifting heavy items, or bearing down. Patient denies autophony (eye blinks). Patient denies persistent motion post cessation of motion or motion while still (rocking or swaying). Family history is positive for cancer (father), and Ménière's disease (mother-diagnosed around age 60). Family history is negative for migraines or other types of dizziness. Patient denies consumption of caffeinated beverages, nicotine, alcoholic substances, or use of medications with known vestibular interactions within the past 48 hours.    TEST RESULTS AND PROCEDURES:   Falls Risk Screening  Falls Risk Completed by: Audiology   Have you fallen 2 or more times in the past year? No  Have you fallen and had an injury in the past year? No  Is the patient receiving Physical Therapy services? No  Fall Screen Comments:      Electrocochleography (ECochG) testing is performed using an auditory evoked potentials system.  Surface electrodes are placed behind the test ear with extratympanic tymptrode placed in the test ear in order to obtain a near-field recording that measures the response of the cochlear hair cells and auditory nerve in response to auditory stimuli. The measured response consists of the summating potential (SP) and the cochlear nerve action potential (AP). Abnormalities may take the form of an increased  summating potential/compound action potential (SP/AP) ratio (due to an increased summating potential) in patients suspected of Meniere's disease (endolymphatic hydrops) or in those patients who have symptoms of vestibular dysfunction or ear symptoms including asymmetric or fluctuating hearing loss, tinnitus and/or aural fullness. The following can be suggestive of an abnormal EcochG: SP/AP ratio exceeds 0.43.  Tympanograms showed normal eardrum mobility bilaterally (completed during VNG). Using a microscope tympanic membranes were visualized.       A two-channel ECochG recording was performed for clicks bilaterally.  Clicks for the right ear showed normal SP/AP ratios.    Clicks for the left ear showed abnormal SP/AP ratios.         Click SP/AP ratio   Right ear  0.356   Left ear  0.731     Abnormal SP/AP ratios must be greater than .43 for clicks.     SUMMARY AND RECOMMENDATIONS: Today s ECochG revealed normal SP/AP ratios in the right ear and abnormal SP/AP ratios in the left ear. Please call this clinic with questions regarding today s results.  Follow-up with ANA Hurtado CNP. for medical management.      Mega Asencio CCC-A  Licensed Audiologist   [place] #[id number]

## 2025-06-11 NOTE — Clinical Note
Hello! Sorry for the delay regarding this patient. cVEMP and oVEMP were both normal. ECOG was normal in the right ear and abnormal in the left ear.   Please let me know if you have any questions or concerns. Thanks!

## 2025-06-16 ENCOUNTER — APPOINTMENT (OUTPATIENT)
Dept: URBAN - METROPOLITAN AREA CLINIC 260 | Age: 45
Setting detail: DERMATOLOGY
End: 2025-06-17

## 2025-06-16 VITALS — WEIGHT: 205 LBS | HEIGHT: 70 IN

## 2025-06-16 DIAGNOSIS — L72.8 OTHER FOLLICULAR CYSTS OF THE SKIN AND SUBCUTANEOUS TISSUE: ICD-10-CM

## 2025-06-16 PROCEDURE — OTHER PRESCRIPTION MEDICATION MANAGEMENT: OTHER

## 2025-06-16 PROCEDURE — OTHER COUNSELING: OTHER

## 2025-06-16 PROCEDURE — OTHER ADDITIONAL NOTES: OTHER

## 2025-06-16 PROCEDURE — OTHER PHOTO-DOCUMENTATION: OTHER

## 2025-06-16 PROCEDURE — OTHER MIPS QUALITY: OTHER

## 2025-06-16 PROCEDURE — OTHER OBSERVATION: OTHER

## 2025-06-16 ASSESSMENT — LOCATION SIMPLE DESCRIPTION DERM: LOCATION SIMPLE: RIGHT EAR

## 2025-06-16 ASSESSMENT — LOCATION DETAILED DESCRIPTION DERM: LOCATION DETAILED: RIGHT INFERIOR HELIX

## 2025-06-16 ASSESSMENT — LOCATION ZONE DERM: LOCATION ZONE: EAR

## 2025-06-18 ENCOUNTER — MYC MEDICAL ADVICE (OUTPATIENT)
Dept: OTOLARYNGOLOGY | Facility: CLINIC | Age: 45
End: 2025-06-18
Payer: COMMERCIAL

## 2025-06-18 DIAGNOSIS — H90.3 ASYMMETRICAL SENSORINEURAL HEARING LOSS: Primary | ICD-10-CM

## 2025-06-18 DIAGNOSIS — H90.42 SENSORINEURAL HEARING LOSS (SNHL) OF LEFT EAR WITH UNRESTRICTED HEARING OF RIGHT EAR: ICD-10-CM

## 2025-06-23 ENCOUNTER — OFFICE VISIT (OUTPATIENT)
Dept: FAMILY MEDICINE | Facility: CLINIC | Age: 45
End: 2025-06-23
Payer: COMMERCIAL

## 2025-06-23 ENCOUNTER — PATIENT OUTREACH (OUTPATIENT)
Dept: CARE COORDINATION | Facility: CLINIC | Age: 45
End: 2025-06-23

## 2025-06-23 VITALS
TEMPERATURE: 98.4 F | DIASTOLIC BLOOD PRESSURE: 81 MMHG | WEIGHT: 210.7 LBS | HEART RATE: 60 BPM | BODY MASS INDEX: 30.16 KG/M2 | SYSTOLIC BLOOD PRESSURE: 132 MMHG | OXYGEN SATURATION: 96 % | HEIGHT: 70 IN | RESPIRATION RATE: 14 BRPM

## 2025-06-23 DIAGNOSIS — M1A.0710 IDIOPATHIC CHRONIC GOUT OF RIGHT FOOT WITHOUT TOPHUS: ICD-10-CM

## 2025-06-23 DIAGNOSIS — H81.13 BENIGN PAROXYSMAL POSITIONAL VERTIGO DUE TO BILATERAL VESTIBULAR DISORDER: ICD-10-CM

## 2025-06-23 DIAGNOSIS — E78.5 HYPERLIPIDEMIA LDL GOAL <70: ICD-10-CM

## 2025-06-23 DIAGNOSIS — Z12.11 SCREEN FOR COLON CANCER: Primary | ICD-10-CM

## 2025-06-23 PROCEDURE — 3079F DIAST BP 80-89 MM HG: CPT | Performed by: FAMILY MEDICINE

## 2025-06-23 PROCEDURE — 3075F SYST BP GE 130 - 139MM HG: CPT | Performed by: FAMILY MEDICINE

## 2025-06-23 PROCEDURE — G2211 COMPLEX E/M VISIT ADD ON: HCPCS | Performed by: FAMILY MEDICINE

## 2025-06-23 PROCEDURE — 99215 OFFICE O/P EST HI 40 MIN: CPT | Performed by: FAMILY MEDICINE

## 2025-06-23 RX ORDER — ASPIRIN 81 MG/1
81 TABLET ORAL DAILY
COMMUNITY
Start: 2025-06-23

## 2025-06-23 RX ORDER — LORAZEPAM 0.5 MG/1
.5-1 TABLET ORAL DAILY PRN
Qty: 20 TABLET | Refills: 0 | Status: SHIPPED | OUTPATIENT
Start: 2025-06-23

## 2025-06-23 RX ORDER — ONDANSETRON 4 MG/1
4-8 TABLET, FILM COATED ORAL EVERY 8 HOURS PRN
Qty: 30 TABLET | Refills: 1 | Status: SHIPPED | OUTPATIENT
Start: 2025-06-23

## 2025-06-23 RX ORDER — DIMENHYDRINATE 50 MG
50 TABLET ORAL DAILY PRN
COMMUNITY
Start: 2025-06-23

## 2025-06-23 NOTE — ASSESSMENT & PLAN NOTE
The patient and I were discussing his statin therapy.  At the time of the initial recognition stroke, a dose of 40 mg was recommended with an LDL-C goal of 40 to 70 mg/dL.  I wonder if this medication is considered primary prevention or secondary prevention at this point for stroke.  Ultimately, the stroke was thought to be embolic via PFO.  The PFO has been closed.  Does he still need to be on a statin and what might his LDL goal be?

## 2025-06-23 NOTE — PROGRESS NOTES
Assessment & Plan   Assessment & Plan  Benign paroxysmal positional vertigo due to bilateral vestibular disorder  The patient requests refills of the medications that in combination were helpful in alleviating symptoms of vertigo.  It has been approximately 7 weeks since his most recent episode and so he is doing relatively better.  However, throughout much of the spring he was having episodes quite frequently.  PDMP reviewed.  He has only had 1 dispense of lorazepam.  After discussion of risks and benefits, medication prescribed.  Dramamine will be helpful and is available over-the-counter.  Zofran also prescribed.  Anticipate this refill will last 6+ months.  Discussed abuse potential.  Orders:    ondansetron (ZOFRAN) 4 MG tablet; Take 1-2 tablets (4-8 mg) by mouth every 8 hours as needed for other or nausea (vertigo symptoms).    LORazepam (ATIVAN) 0.5 MG tablet; Take 1-2 tablets (0.5-1 mg) by mouth daily as needed (vertigo symptoms).    dimenhyDRINATE (DRAMAMINE) 50 MG tablet; Take 1 tablet (50 mg) by mouth daily as needed (Vertigo).    Screen for colon cancer    Orders:    Colonoscopy Screening  Referral; Future    Hyperlipidemia LDL goal <70  The patient and I were discussing his statin therapy.  At the time of the initial recognition stroke, a dose of 40 mg was recommended with an LDL-C goal of 40 to 70 mg/dL.  I wonder if this medication is considered primary prevention or secondary prevention at this point for stroke.  Ultimately, the stroke was thought to be embolic via PFO.  The PFO has been closed.  Does he still need to be on a statin and what might his LDL goal be?         Idiopathic chronic gout of right foot without tophus  He is on urate lowering therapy.  Currently taking 1 mg of allopurinol.  He has had some intermittent symptoms but it has been a number of months.  If he has more gout flares we will increase his dose to 200 mg/day.  He has a supply of colchicine that can be taken as  "needed for flares of gout.  He will report back.  Orders:    Uric acid; Future      41 minutes spent by me on the date of the encounter doing chart review, history and exam, documentation and further activities per the note    The longitudinal plan of care for the diagnosis(es)/condition(s) as documented were addressed during this visit. Due to the added complexity in care, I will continue to support Chino in the subsequent management and with ongoing continuity of care.    Subjective   Chino is a 44 year old, presenting for the following health issues:  Recheck Medication        6/23/2025     1:34 PM   Additional Questions   Roomed by AC   Accompanied by self     Follow up - establish care?    - maintaining spirits.  There are days that are tough   - vertigo: ongoing.  He has \"out breaks\" most recently 4/28.  Was more frequent over the winter months. He is eating better in general and taking magnesium and vit d.  \"Dizzy testing\" was okay.  Etiology?  Stroke diagnosis was a result of dizzy symptoms.  Left sided hearing loss: plan TBD (hearing aid?).  During a recurrence he is helped by dramamine, zofran and lorazepam (\"I start to get panic attacks.\").    - stroke sequelae:      - PFO / atrial fibrillation.      History of Present Illness       Reason for visit:  Review medications and post PFO closure and vertigo test results.    He eats 2-3 servings of fruits and vegetables daily.He consumes 1 sweetened beverage(s) daily.He exercises with enough effort to increase his heart rate 20 to 29 minutes per day.  He exercises with enough effort to increase his heart rate 5 days per week.   He is taking medications regularly.          Objective    /81 (BP Location: Left arm, Patient Position: Sitting, Cuff Size: Adult Regular)   Pulse 60   Temp 98.4  F (36.9  C) (Oral)   Resp 14   Ht 1.778 m (5' 10\")   Wt 95.6 kg (210 lb 11.2 oz)   SpO2 96%   BMI 30.23 kg/m    Body mass index is 30.23 kg/m .  Physical " Exam  Nursing note reviewed.   Constitutional:       General: He is not in acute distress.     Appearance: Normal appearance. He is not ill-appearing.   HENT:      Head: Normocephalic and atraumatic.   Eyes:      Extraocular Movements: Extraocular movements intact.      Conjunctiva/sclera: Conjunctivae normal.   Pulmonary:      Effort: Pulmonary effort is normal.   Neurological:      Mental Status: He is alert and oriented to person, place, and time.   Psychiatric:         Attention and Perception: Attention normal.         Mood and Affect: Mood normal.         Speech: Speech normal.         Thought Content: Thought content normal.                Signed Electronically by: Koby Diallo MD

## 2025-06-23 NOTE — ASSESSMENT & PLAN NOTE
He is on urate lowering therapy.  Currently taking 1 mg of allopurinol.  He has had some intermittent symptoms but it has been a number of months.  If he has more gout flares we will increase his dose to 200 mg/day.  He has a supply of colchicine that can be taken as needed for flares of gout.  He will report back.  Orders:    Uric acid; Future

## 2025-06-24 NOTE — ASSESSMENT & PLAN NOTE
The patient requests refills of the medications that in combination were helpful in alleviating symptoms of vertigo.  It has been approximately 7 weeks since his most recent episode and so he is doing relatively better.  However, throughout much of the spring he was having episodes quite frequently.  PDMP reviewed.  He has only had 1 dispense of lorazepam.  After discussion of risks and benefits, medication prescribed.  Dramamine will be helpful and is available over-the-counter.  Zofran also prescribed.  Anticipate this refill will last 6+ months.  Discussed abuse potential.  Orders:    ondansetron (ZOFRAN) 4 MG tablet; Take 1-2 tablets (4-8 mg) by mouth every 8 hours as needed for other or nausea (vertigo symptoms).    LORazepam (ATIVAN) 0.5 MG tablet; Take 1-2 tablets (0.5-1 mg) by mouth daily as needed (vertigo symptoms).    dimenhyDRINATE (DRAMAMINE) 50 MG tablet; Take 1 tablet (50 mg) by mouth daily as needed (Vertigo).

## 2025-08-03 PROBLEM — R76.0 LUPUS ANTICOAGULANT POSITIVE: Status: RESOLVED | Noted: 2024-12-13 | Resolved: 2025-08-03

## 2025-08-03 PROBLEM — Z86.73 HISTORY OF STROKE: Status: ACTIVE | Noted: 2024-12-05

## 2025-08-03 PROBLEM — R06.83 SNORING: Status: RESOLVED | Noted: 2023-09-15 | Resolved: 2025-08-03

## 2025-08-08 ENCOUNTER — APPOINTMENT (OUTPATIENT)
Dept: URBAN - METROPOLITAN AREA CLINIC 260 | Age: 45
Setting detail: DERMATOLOGY
End: 2025-08-15

## 2025-08-08 DIAGNOSIS — L72.8 OTHER FOLLICULAR CYSTS OF THE SKIN AND SUBCUTANEOUS TISSUE: ICD-10-CM

## 2025-08-08 PROCEDURE — 11442 EXC FACE-MM B9+MARG 1.1-2 CM: CPT

## 2025-08-08 PROCEDURE — OTHER MIPS QUALITY: OTHER

## 2025-08-08 PROCEDURE — OTHER COUNSELING: OTHER

## 2025-08-08 PROCEDURE — OTHER EXCISION: OTHER

## 2025-08-08 ASSESSMENT — LOCATION ZONE DERM: LOCATION ZONE: EAR

## 2025-08-08 ASSESSMENT — LOCATION SIMPLE DESCRIPTION DERM: LOCATION SIMPLE: RIGHT EAR

## 2025-08-08 ASSESSMENT — LOCATION DETAILED DESCRIPTION DERM: LOCATION DETAILED: RIGHT INFERIOR HELIX

## 2025-08-22 ENCOUNTER — HOSPITAL ENCOUNTER (OUTPATIENT)
Dept: CARDIOLOGY | Facility: CLINIC | Age: 45
Discharge: HOME | End: 2025-08-22
Attending: INTERNAL MEDICINE | Admitting: INTERNAL MEDICINE
Payer: COMMERCIAL

## 2025-08-22 DIAGNOSIS — Q21.12 PFO (PATENT FORAMEN OVALE): ICD-10-CM

## 2025-08-22 DIAGNOSIS — Q21.12 PATENT FORAMEN OVALE WITH RIGHT TO LEFT SHUNT: ICD-10-CM

## 2025-08-22 LAB — LVEF ECHO: NORMAL

## 2025-08-22 PROCEDURE — 93306 TTE W/DOPPLER COMPLETE: CPT | Mod: 26 | Performed by: INTERNAL MEDICINE

## 2025-08-22 PROCEDURE — 999N000208 ECHOCARDIOGRAM COMPLETE

## 2025-08-26 ENCOUNTER — APPOINTMENT (OUTPATIENT)
Dept: URBAN - METROPOLITAN AREA CLINIC 260 | Age: 45
Setting detail: DERMATOLOGY
End: 2025-08-26

## 2025-08-26 VITALS — WEIGHT: 210 LBS | HEIGHT: 70 IN

## 2025-08-26 DIAGNOSIS — D18.0 HEMANGIOMA: ICD-10-CM

## 2025-08-26 DIAGNOSIS — L98419 CHRONIC ULCER OF OTHER SPECIFIED SITES: ICD-10-CM

## 2025-08-26 DIAGNOSIS — L98429 CHRONIC ULCER OF OTHER SPECIFIED SITES: ICD-10-CM

## 2025-08-26 PROBLEM — D18.01 HEMANGIOMA OF SKIN AND SUBCUTANEOUS TISSUE: Status: ACTIVE | Noted: 2025-08-26

## 2025-08-26 PROBLEM — L98.499 NON-PRESSURE CHRONIC ULCER OF SKIN OF OTHER SITES WITH UNSPECIFIED SEVERITY: Status: ACTIVE | Noted: 2025-08-26

## 2025-08-26 PROCEDURE — OTHER COUNSELING: OTHER

## 2025-08-26 PROCEDURE — OTHER ULCER EVALUATION: OTHER

## 2025-08-26 PROCEDURE — 99212 OFFICE O/P EST SF 10 MIN: CPT

## 2025-08-26 PROCEDURE — OTHER PHOTO-DOCUMENTATION: OTHER

## 2025-08-26 PROCEDURE — OTHER MIPS QUALITY: OTHER

## 2025-08-26 PROCEDURE — OTHER ORDER TESTS: OTHER

## 2025-08-26 ASSESSMENT — LOCATION ZONE DERM
LOCATION ZONE: EAR
LOCATION ZONE: ARM

## 2025-08-26 ASSESSMENT — LOCATION DETAILED DESCRIPTION DERM
LOCATION DETAILED: RIGHT INFERIOR HELIX
LOCATION DETAILED: LEFT PROXIMAL RADIAL DORSAL FOREARM

## 2025-08-26 ASSESSMENT — LOCATION SIMPLE DESCRIPTION DERM
LOCATION SIMPLE: LEFT FOREARM
LOCATION SIMPLE: RIGHT EAR

## 2025-09-04 ENCOUNTER — OFFICE VISIT (OUTPATIENT)
Dept: AUDIOLOGY | Facility: CLINIC | Age: 45
End: 2025-09-04
Payer: COMMERCIAL

## 2025-09-04 DIAGNOSIS — H90.42 SENSORINEURAL HEARING LOSS (SNHL) OF LEFT EAR WITH UNRESTRICTED HEARING OF RIGHT EAR: Primary | ICD-10-CM

## (undated) DEVICE — INTRO SHEATH 6FRX10CM PINNACLE RSS602

## (undated) DEVICE — TOTE ANGIO CORP PC15AT SAN32CC83O

## (undated) DEVICE — WIRE GUIDE AMPLATZER SUPER STIFF .035"X260CM J TIP 9-GW-002

## (undated) DEVICE — GLIDEWIRE TERUMO .035X180CM 1.5,, J-TIP GR3525

## (undated) DEVICE — SHEATH PINNACLE 9FR 10CM W/MARKER

## (undated) DEVICE — MANIFOLD KIT ANGIO AUTOMATED 014613

## (undated) DEVICE — GUIDEWIRE HI-TORQUE VERSACORE MOD J 1

## (undated) DEVICE — WIRE GUIDE 0.018"X180CM PLATINUM PLUS H74917531

## (undated) DEVICE — INTRODUCER SHEATH 12FRX12CM FAST-CATH 406128

## (undated) DEVICE — CATH ANGIO SLCT 6FR DIA 100CML

## (undated) DEVICE — RAD INTRODUCER KIT MICRO 5FRX10CM .018 NITINOL G/W

## (undated) DEVICE — DEFIB PRO-PADZ LVP LQD GEL ADULT 8900-2105-01

## (undated) DEVICE — INTRODUCER SHEATH FAST-CATH SWARTZ 8.5FRX63CM SL1 CVD 406849

## (undated) DEVICE — CATH US 8FR -90 ACUNAV INTVASC

## (undated) DEVICE — WIRE GUIDE 0.035"X260CM SAFE-T-J EXCHANGE G00517

## (undated) DEVICE — KIT HAND CONTROL ANGIOTOUCH ACIST 65CM AT-P65

## (undated) DEVICE — CLOSURE DEVICE 6FR VASC PROGLIDE MEDICATED SUTURE 12673-03

## (undated) DEVICE — WIRE GUIDE 0.035"X150CM EMERALD STR 502542

## (undated) DEVICE — CATH ANGIO JUDKINS R4 6FRX100CM INFINITI 534621T

## (undated) RX ORDER — FENTANYL CITRATE 50 UG/ML
INJECTION, SOLUTION INTRAMUSCULAR; INTRAVENOUS
Status: DISPENSED
Start: 2025-02-27

## (undated) RX ORDER — ASPIRIN 81 MG/1
TABLET ORAL
Status: DISPENSED
Start: 2025-02-27

## (undated) RX ORDER — HEPARIN SODIUM 1000 [USP'U]/ML
INJECTION, SOLUTION INTRAVENOUS; SUBCUTANEOUS
Status: DISPENSED
Start: 2025-02-27

## (undated) RX ORDER — METOPROLOL TARTRATE 1 MG/ML
INJECTION, SOLUTION INTRAVENOUS
Status: DISPENSED
Start: 2025-02-27

## (undated) RX ORDER — CEFAZOLIN SODIUM 2 G/50ML
SOLUTION INTRAVENOUS
Status: DISPENSED
Start: 2025-02-27

## (undated) RX ORDER — ONDANSETRON 2 MG/ML
INJECTION INTRAMUSCULAR; INTRAVENOUS
Status: DISPENSED
Start: 2025-02-27

## (undated) RX ORDER — PROTAMINE SULFATE 10 MG/ML
INJECTION, SOLUTION INTRAVENOUS
Status: DISPENSED
Start: 2025-02-27

## (undated) RX ORDER — LIDOCAINE HYDROCHLORIDE 10 MG/ML
INJECTION, SOLUTION EPIDURAL; INFILTRATION; INTRACAUDAL; PERINEURAL
Status: DISPENSED
Start: 2025-02-27

## (undated) RX ORDER — HEPARIN SODIUM 200 [USP'U]/100ML
INJECTION, SOLUTION INTRAVENOUS
Status: DISPENSED
Start: 2025-02-27

## (undated) RX ORDER — CLOPIDOGREL BISULFATE 75 MG/1
TABLET ORAL
Status: DISPENSED
Start: 2025-02-27